# Patient Record
Sex: MALE | Race: WHITE | NOT HISPANIC OR LATINO | Employment: OTHER | URBAN - METROPOLITAN AREA
[De-identification: names, ages, dates, MRNs, and addresses within clinical notes are randomized per-mention and may not be internally consistent; named-entity substitution may affect disease eponyms.]

---

## 2017-01-02 ENCOUNTER — TELEPHONE (OUTPATIENT)
Dept: HEPATOLOGY | Facility: CLINIC | Age: 67
End: 2017-01-02

## 2017-01-02 DIAGNOSIS — R76.8 HEPATITIS C ANTIBODY TEST POSITIVE: ICD-10-CM

## 2017-01-03 NOTE — TELEPHONE ENCOUNTER
I spoke with patient, confirmed start date of 1/5. Informed of upcoming scheduled labs. Appointment letters mailed to patient.

## 2017-01-03 NOTE — TELEPHONE ENCOUNTER
Pl schedule u/s abdomen doppler (not pelvis as sally Ibarra scheduled) and AFP now. They never got done.  Pl inform pt these are done to look for any tumor in the liver.  It is important to do them.   Thanks

## 2017-01-03 NOTE — TELEPHONE ENCOUNTER
MA called patient to schedule his labs and US, patient stated that he does not want to schedule this test right now, he is not ready. He will give us a call sometime next week to schedule the test.MILENA

## 2017-01-10 DIAGNOSIS — R00.0 TACHYCARDIA: ICD-10-CM

## 2017-01-10 DIAGNOSIS — I10 ESSENTIAL HYPERTENSION, BENIGN: ICD-10-CM

## 2017-01-10 DIAGNOSIS — I25.2 HISTORY OF MI (MYOCARDIAL INFARCTION): ICD-10-CM

## 2017-01-10 DIAGNOSIS — E78.5 OTHER AND UNSPECIFIED HYPERLIPIDEMIA: ICD-10-CM

## 2017-01-10 RX ORDER — PRAVASTATIN SODIUM 40 MG/1
TABLET ORAL
Qty: 90 TABLET | Refills: 1 | Status: SHIPPED | OUTPATIENT
Start: 2017-01-10 | End: 2017-05-22 | Stop reason: CLARIF

## 2017-01-10 RX ORDER — LABETALOL 100 MG/1
TABLET, FILM COATED ORAL
Qty: 180 TABLET | Refills: 1 | Status: ON HOLD | OUTPATIENT
Start: 2017-01-10 | End: 2017-07-04

## 2017-01-12 ENCOUNTER — TELEPHONE (OUTPATIENT)
Dept: PHARMACY | Facility: CLINIC | Age: 67
End: 2017-01-12

## 2017-01-12 ENCOUNTER — TELEPHONE (OUTPATIENT)
Dept: HEPATOLOGY | Facility: CLINIC | Age: 67
End: 2017-01-12

## 2017-01-12 NOTE — TELEPHONE ENCOUNTER
I spoke with patient.  He wants to have labs drawn at [facility].  Unable to schedule in [facility].  I will send hard copy lab orders to patient and clinic if unable to schedule in [facility].

## 2017-01-12 NOTE — TELEPHONE ENCOUNTER
----- Message from Jennifer Deluca MA sent at 1/12/2017  1:42 PM CST -----  Contact: self   [phone]      ----- Message -----     From: Candace Weiss     Sent: 1/12/2017  12:15 PM       To: Adonis Collier Staff    Patient called stating the frequent trips to the [facility] for labs will be very expensive. Patient asking if he can go to the lab located inside [facility] in [place], [place]Thanks!

## 2017-01-12 NOTE — TELEPHONE ENCOUNTER
zofia f/u complete. Name/ confirmed. Medication list reviewed. Consultation included: indication; goals of treatment; administration; storage and handling; side effects; how to handle side effects; the importance of compliance; how to handle missed doses; the importance of laboratory monitoring; the importance of keeping all follow up appointments. Patient understands to report any medication changes to [facility] and provider. All questions answered and addressed to patients satisfaction. Patient confirmed start date noted above. He feels well. Understands treatment regimen and goals. He reports NO side effects. He uses a calendar to assist with compliance.

## 2017-01-24 ENCOUNTER — TELEPHONE (OUTPATIENT)
Dept: PHARMACY | Facility: CLINIC | Age: 67
End: 2017-01-24

## 2017-01-25 NOTE — TELEPHONE ENCOUNTER
[facility] refill arrangement. Confirmed 2 patient identifiers - name and . He/she has 7 doses remaining which will get him/her through 17. No side effects or missed doses reported. He/she confirms no changes to insurance or health and has no further questions at this time. Patient asked to have medication shipped on 17 to arrive at patient's home on 17. $0 copay. Address and CC info confirmed. [facility] will continue to reach out to patient monthly to arrange refills.

## 2017-02-02 ENCOUNTER — LAB VISIT (OUTPATIENT)
Dept: LAB | Facility: HOSPITAL | Age: 67
End: 2017-02-02
Attending: INTERNAL MEDICINE
Payer: MEDICARE

## 2017-02-02 ENCOUNTER — EPISODE CHANGES (OUTPATIENT)
Dept: HEPATOLOGY | Facility: CLINIC | Age: 67
End: 2017-02-02

## 2017-02-02 DIAGNOSIS — R76.8 HEPATITIS C ANTIBODY TEST POSITIVE: ICD-10-CM

## 2017-02-02 LAB
ALBUMIN SERPL BCP-MCNC: 4.1 G/DL
ALP SERPL-CCNC: 77 U/L
ALT SERPL W/O P-5'-P-CCNC: 21 U/L
ANION GAP SERPL CALC-SCNC: 13 MMOL/L
AST SERPL-CCNC: 31 U/L
BILIRUB SERPL-MCNC: 0.5 MG/DL
BUN SERPL-MCNC: 21 MG/DL
CALCIUM SERPL-MCNC: 10.1 MG/DL
CHLORIDE SERPL-SCNC: 101 MMOL/L
CO2 SERPL-SCNC: 27 MMOL/L
CREAT SERPL-MCNC: 1.1 MG/DL
EST. GFR  (AFRICAN AMERICAN): >60 ML/MIN/1.73 M^2
EST. GFR  (NON AFRICAN AMERICAN): >60 ML/MIN/1.73 M^2
GLUCOSE SERPL-MCNC: 75 MG/DL
POTASSIUM SERPL-SCNC: 3.9 MMOL/L
PROT SERPL-MCNC: 7.7 G/DL
SODIUM SERPL-SCNC: 141 MMOL/L

## 2017-02-02 PROCEDURE — 36415 COLL VENOUS BLD VENIPUNCTURE: CPT

## 2017-02-02 PROCEDURE — 87522 HEPATITIS C REVRS TRNSCRPJ: CPT

## 2017-02-02 PROCEDURE — 80053 COMPREHEN METABOLIC PANEL: CPT

## 2017-02-03 LAB — HEPATITIS C VIRUS (HCV) RNA DETECTION/QUANTIFICATION RT-PCR: NORMAL IU/ML

## 2017-02-07 ENCOUNTER — TELEPHONE (OUTPATIENT)
Dept: HEPATOLOGY | Facility: CLINIC | Age: 67
End: 2017-02-07

## 2017-02-07 NOTE — TELEPHONE ENCOUNTER
----- Message from Amira Lamb MD sent at 2/4/2017  7:07 PM CST -----  Pl inform pt:  Wk 4 on 2/2/17: CMP Ok, HCV RNA delmar quant <15, undetected.  Reminder next lab:  Wk 6 on 2/16/17: CMP, HCV RNA delmar quant ordered

## 2017-02-16 ENCOUNTER — EPISODE CHANGES (OUTPATIENT)
Dept: HEPATOLOGY | Facility: CLINIC | Age: 67
End: 2017-02-16

## 2017-02-16 ENCOUNTER — HOSPITAL ENCOUNTER (OUTPATIENT)
Dept: RADIOLOGY | Facility: HOSPITAL | Age: 67
Discharge: HOME | End: 2017-02-16
Attending: INTERNAL MEDICINE
Payer: MEDICARE

## 2017-02-16 DIAGNOSIS — B18.2 HEP C W/O COMA, CHRONIC: ICD-10-CM

## 2017-02-16 PROCEDURE — 76705 ECHO EXAM OF ABDOMEN: CPT | Mod: 26,,, | Performed by: RADIOLOGY

## 2017-02-16 PROCEDURE — 76705 ECHO EXAM OF ABDOMEN: CPT | Mod: TC

## 2017-02-20 ENCOUNTER — TELEPHONE (OUTPATIENT)
Dept: HEPATOLOGY | Facility: CLINIC | Age: 67
End: 2017-02-20

## 2017-02-20 NOTE — TELEPHONE ENCOUNTER
----- Message from Amira Lamb MD sent at 2/18/2017  6:48 AM CST -----  Pl inform patient:  U/S abd 2/16/17: there is a small cyst in the liver, similar to finding on CT scan in Oct 2014.  No tumor in the liver.

## 2017-02-20 NOTE — TELEPHONE ENCOUNTER
----- Message from Jennifer Deluca MA sent at 2/20/2017  9:24 AM CST -----      ----- Message -----     From: Amira Lamb MD     Sent: 2/18/2017   2:56 PM       To: [facility] Clinical Staff    Please inform patient:  Wk 6 on 2/16/17: AST and ALT normal, HCV RNA delmar undetected  Reminder next lab:  Wk 8 on 3/2/17: CMP, HCV RNA delmar quant ordered

## 2017-03-02 ENCOUNTER — EPISODE CHANGES (OUTPATIENT)
Dept: HEPATOLOGY | Facility: CLINIC | Age: 67
End: 2017-03-02

## 2017-03-03 ENCOUNTER — TELEPHONE (OUTPATIENT)
Dept: OPHTHALMOLOGY | Facility: CLINIC | Age: 67
End: 2017-03-03

## 2017-03-03 DIAGNOSIS — H40.003 GLAUCOMA SUSPECT, BILATERAL: Primary | ICD-10-CM

## 2017-03-03 RX ORDER — LATANOPROST 50 UG/ML
SOLUTION/ DROPS OPHTHALMIC
Qty: 7.5 ML | Refills: 0 | Status: SHIPPED | OUTPATIENT
Start: 2017-03-03 | End: 2017-11-02 | Stop reason: SDUPTHER

## 2017-03-06 ENCOUNTER — LAB VISIT (OUTPATIENT)
Dept: LAB | Facility: HOSPITAL | Age: 67
End: 2017-03-06
Attending: INTERNAL MEDICINE
Payer: MEDICARE

## 2017-03-06 ENCOUNTER — EPISODE CHANGES (OUTPATIENT)
Dept: HEPATOLOGY | Facility: CLINIC | Age: 67
End: 2017-03-06

## 2017-03-06 DIAGNOSIS — R76.8 HEPATITIS C ANTIBODY TEST POSITIVE: ICD-10-CM

## 2017-03-06 LAB
ALBUMIN SERPL BCP-MCNC: 4.2 G/DL
ALP SERPL-CCNC: 78 U/L
ALT SERPL W/O P-5'-P-CCNC: 22 U/L
ANION GAP SERPL CALC-SCNC: 12 MMOL/L
AST SERPL-CCNC: 35 U/L
BILIRUB SERPL-MCNC: 0.6 MG/DL
BUN SERPL-MCNC: 16 MG/DL
CALCIUM SERPL-MCNC: 10.3 MG/DL
CHLORIDE SERPL-SCNC: 102 MMOL/L
CO2 SERPL-SCNC: 30 MMOL/L
CREAT SERPL-MCNC: 1.1 MG/DL
EST. GFR  (AFRICAN AMERICAN): >60 ML/MIN/1.73 M^2
EST. GFR  (NON AFRICAN AMERICAN): >60 ML/MIN/1.73 M^2
GLUCOSE SERPL-MCNC: 80 MG/DL
POTASSIUM SERPL-SCNC: 4.3 MMOL/L
PROT SERPL-MCNC: 7.9 G/DL
SODIUM SERPL-SCNC: 144 MMOL/L

## 2017-03-06 PROCEDURE — 80053 COMPREHEN METABOLIC PANEL: CPT

## 2017-03-06 PROCEDURE — 36415 COLL VENOUS BLD VENIPUNCTURE: CPT

## 2017-03-06 PROCEDURE — 87522 HEPATITIS C REVRS TRNSCRPJ: CPT

## 2017-03-07 LAB — HEPATITIS C VIRUS (HCV) RNA DETECTION/QUANTIFICATION RT-PCR: NORMAL IU/ML

## 2017-03-08 ENCOUNTER — TELEPHONE (OUTPATIENT)
Dept: HEPATOLOGY | Facility: CLINIC | Age: 67
End: 2017-03-08

## 2017-03-08 RX ORDER — BRIMONIDINE TARTRATE 1.5 MG/ML
SOLUTION/ DROPS OPHTHALMIC
Qty: 30 ML | Refills: 0 | Status: SHIPPED | OUTPATIENT
Start: 2017-03-08 | End: 2017-10-09 | Stop reason: SDUPTHER

## 2017-03-08 NOTE — TELEPHONE ENCOUNTER
----- Message from Amira Lamb MD sent at 3/8/2017 12:43 AM CST -----  Pl inform patient:  Wk 8 on 3/2/17:  AST and ALT normal, HCV RNA delmar quant undetected  Reminder next lab:  Post-rx wk 4 on 3/30/17:  HCV RNA quant ordered

## 2017-03-10 ENCOUNTER — TELEPHONE (OUTPATIENT)
Dept: FAMILY MEDICINE | Facility: CLINIC | Age: 67
End: 2017-03-10

## 2017-03-10 NOTE — TELEPHONE ENCOUNTER
----- Message from Kailee Lucia sent at 3/10/2017  3:12 PM CST -----  Pt called requesting a call back regarding his medication/pls call back at [phone]

## 2017-03-10 NOTE — TELEPHONE ENCOUNTER
Patient is seeing a provider for workers comp for broken hip and back and they are no longer writing his Ambien and flexeril. The patient is wanting Dr Dickson to start writing the medication for him. I informed the patient that he would need to schedule an appointment with  to discuss this matter. Patient is going to call back for an appointment

## 2017-03-30 ENCOUNTER — EPISODE CHANGES (OUTPATIENT)
Dept: HEPATOLOGY | Facility: CLINIC | Age: 67
End: 2017-03-30

## 2017-04-03 ENCOUNTER — LAB VISIT (OUTPATIENT)
Dept: LAB | Facility: HOSPITAL | Age: 67
End: 2017-04-03
Attending: INTERNAL MEDICINE
Payer: MEDICARE

## 2017-04-03 ENCOUNTER — EPISODE CHANGES (OUTPATIENT)
Dept: HEPATOLOGY | Facility: CLINIC | Age: 67
End: 2017-04-03

## 2017-04-03 DIAGNOSIS — R76.8 HEPATITIS C ANTIBODY TEST POSITIVE: ICD-10-CM

## 2017-04-03 PROCEDURE — 87522 HEPATITIS C REVRS TRNSCRPJ: CPT

## 2017-04-03 PROCEDURE — 36415 COLL VENOUS BLD VENIPUNCTURE: CPT

## 2017-04-04 LAB — HEPATITIS C VIRUS (HCV) RNA DETECTION/QUANTIFICATION RT-PCR: NORMAL IU/ML

## 2017-04-05 ENCOUNTER — PATIENT MESSAGE (OUTPATIENT)
Dept: HEPATOLOGY | Facility: CLINIC | Age: 67
End: 2017-04-05

## 2017-04-05 ENCOUNTER — EPISODE CHANGES (OUTPATIENT)
Dept: HEPATOLOGY | Facility: CLINIC | Age: 67
End: 2017-04-05

## 2017-04-05 NOTE — TELEPHONE ENCOUNTER
Pl inform pt:  Post-rx wk 4 on 3/30/17:  HCV RNA quant undetected, SVR4  Reminder next lab:  Post-rx wk 12 on 5/25/17: HCV RNA quant ordered

## 2017-04-19 ENCOUNTER — TELEPHONE (OUTPATIENT)
Dept: OPHTHALMOLOGY | Facility: CLINIC | Age: 67
End: 2017-04-19

## 2017-04-19 NOTE — TELEPHONE ENCOUNTER
----- Message from Laura Ashley sent at 4/19/2017 12:15 PM CDT -----  Contact: Patient  Patient would like to re-schedule all three appointments that is scheduled for 04/52/2017.  The next available appointments aren't until June.  Please call [phone].  Thank you

## 2017-04-25 ENCOUNTER — OFFICE VISIT (OUTPATIENT)
Dept: FAMILY MEDICINE | Facility: CLINIC | Age: 67
End: 2017-04-25
Payer: MEDICARE

## 2017-04-25 ENCOUNTER — DOCUMENTATION ONLY (OUTPATIENT)
Dept: FAMILY MEDICINE | Facility: CLINIC | Age: 67
End: 2017-04-25

## 2017-04-25 ENCOUNTER — NURSE TRIAGE (OUTPATIENT)
Dept: ADMINISTRATIVE | Facility: CLINIC | Age: 67
End: 2017-04-25

## 2017-04-25 VITALS
HEIGHT: 69 IN | OXYGEN SATURATION: 94 % | DIASTOLIC BLOOD PRESSURE: 85 MMHG | BODY MASS INDEX: 29.16 KG/M2 | HEART RATE: 73 BPM | SYSTOLIC BLOOD PRESSURE: 119 MMHG | RESPIRATION RATE: 16 BRPM | TEMPERATURE: 98 F | WEIGHT: 196.88 LBS

## 2017-04-25 DIAGNOSIS — F33.9 EPISODE OF RECURRENT MAJOR DEPRESSIVE DISORDER, UNSPECIFIED DEPRESSION EPISODE SEVERITY: Primary | ICD-10-CM

## 2017-04-25 DIAGNOSIS — R07.89 CHEST TIGHTNESS: ICD-10-CM

## 2017-04-25 DIAGNOSIS — M62.838 MUSCLE SPASM: ICD-10-CM

## 2017-04-25 PROCEDURE — 99214 OFFICE O/P EST MOD 30 MIN: CPT | Mod: S$GLB,,, | Performed by: NURSE PRACTITIONER

## 2017-04-25 PROCEDURE — 93005 ELECTROCARDIOGRAM TRACING: CPT | Mod: S$GLB,,, | Performed by: NURSE PRACTITIONER

## 2017-04-25 PROCEDURE — 1160F RVW MEDS BY RX/DR IN RCRD: CPT | Mod: S$GLB,,, | Performed by: NURSE PRACTITIONER

## 2017-04-25 PROCEDURE — 3079F DIAST BP 80-89 MM HG: CPT | Mod: S$GLB,,, | Performed by: NURSE PRACTITIONER

## 2017-04-25 PROCEDURE — 93010 ELECTROCARDIOGRAM REPORT: CPT | Mod: ,,, | Performed by: INTERNAL MEDICINE

## 2017-04-25 PROCEDURE — 3074F SYST BP LT 130 MM HG: CPT | Mod: S$GLB,,, | Performed by: NURSE PRACTITIONER

## 2017-04-25 RX ORDER — FENTANYL 75 UG/H
2 PATCH TRANSDERMAL DAILY
Refills: 0 | COMMUNITY
Start: 2017-04-12 | End: 2017-05-22

## 2017-04-25 RX ORDER — TIZANIDINE 4 MG/1
4 TABLET ORAL 3 TIMES DAILY PRN
Qty: 90 TABLET | Refills: 11 | Status: SHIPPED | OUTPATIENT
Start: 2017-04-25 | End: 2017-05-05

## 2017-04-25 RX ORDER — FLUOXETINE HYDROCHLORIDE 40 MG/1
40 CAPSULE ORAL DAILY
Qty: 30 CAPSULE | Refills: 11 | Status: SHIPPED | OUTPATIENT
Start: 2017-04-25 | End: 2017-07-12 | Stop reason: SDUPTHER

## 2017-04-25 NOTE — PROGRESS NOTES
Health Maintenance Due   Topic Date Due    TETANUS VACCINE  12/08/1969    Colonoscopy  12/08/2001    Pneumococcal (65+) (1 of 2 - PCV13) 12/08/2016

## 2017-04-25 NOTE — MR AVS SNAPSHOT
[facility]  [address] [address] [address]  Phone: [phone]  Fax: [phone]                  Benny Murray   2017 1:20 PM   Office Visit    Description:  Male : 1950   Provider:  PARRISH Osman   Department:  [facility]           Reason for Visit     Chest Pain           Diagnoses this Visit        Comments    Episode of recurrent major depressive disorder, unspecified depression episode severity    -  Primary     Muscle spasm                To Do List           Future Appointments        Provider Department Dept Phone    2017 8:00 AM [facility] [phone]    2017 10:45 AM Skyla Crow MD [place] [facility] - Ophthalmology [phone]    2017 11:00 AM [facility] [place] [facility] 2 - Ophthalmology [phone]    2017 11:30 AM [facility] [place] [facility] 2 - Ophthalmology [phone]    2017 8:00 AM [facility], [facility] [phone]      Goals (5 Years of Data)     None      Follow-Up and Disposition     Return if symptoms worsen or fail to improve.    Follow-up and Disposition History       These Medications        Disp Refills Start End    fluoxetine (PROZAC) 40 MG capsule 30 capsule 11 2017     Take 1 capsule (40 mg total) by mouth once daily. - Oral    Pharmacy: [place] [id number] - [facility] [address] [facility] [address] Ph #: [phone]       tizanidine (ZANAFLEX) 4 MG tablet 90 tablet 11 2017    Take 1 tablet (4 mg total) by mouth 3 (three) times daily as needed. - Oral    Pharmacy: [place] [id number] - [facility] [address] [facility] [address] Ph #: [phone]         [facility] On Call     [facility] On Call Nurse Care Line - 24/7 Assistance  Unless otherwise directed by your provider, please contact [facility], our nurse care line that is available for 24/7 assistance.     Registered nurses in the [facility] On Call  "Center provide: appointment scheduling, clinical advisement, health education, and other advisory services.  Call: [phone] (toll free)               Medications           Message regarding Medications     Verify the changes and/or additions to your medication regime listed below are the same as discussed with your clinician today.  If any of these changes or additions are incorrect, please notify your healthcare provider.        START taking these NEW medications        Refills    tizanidine (ZANAFLEX) 4 MG tablet 11    Sig: Take 1 tablet (4 mg total) by mouth 3 (three) times daily as needed.    Class: Normal    Route: Oral      CHANGE how you are taking these medications     Start Taking Instead of    fluoxetine (PROZAC) 40 MG capsule fluoxetine (PROZAC) 40 MG capsule    Dosage:  Take 1 capsule (40 mg total) by mouth once daily.     Reason for Change:  Reorder       STOP taking these medications     fentaNYL (DURAGESIC) 100 mcg/hr Place 1 patch onto the skin every 72 hours. Pt takes 150mcg total.  Uses 75mcg and changes daily.    fentaNYL (DURAGESIC) 50 mcg/hr Place 1 patch onto the skin every 72 hours. Pt takes 150mcg total.  Pt uses 75mcg patch and changes daily    ledipasvir-sofosbuvir  mg Tab Take 1 tablet by mouth once daily.    cyclobenzaprine (FLEXERIL) 10 MG tablet            Verify that the below list of medications is an accurate representation of the medications you are currently taking.  If none reported, the list may be blank. If incorrect, please contact your healthcare provider. Carry this list with you in case of emergency.           Current Medications     aspirin (ECOTRIN) 81 MG EC tablet Take 81 mg by mouth once daily.     BD LUER-RADHA SYRINGE 3 mL 23 x 1 1/2" Syrg     brimonidine 0.15 % OPTH DROP (ALPHAGAN) 0.15 % ophthalmic solution INSTILL 1 DROP INTO EACH EYE TWICE A DAY    fentaNYL (DURAGESIC) 75 mcg/hr Place 2 patches onto the skin once daily.    fluoxetine (PROZAC) 40 MG " "capsule Take 1 capsule (40 mg total) by mouth once daily.    labetalol (NORMODYNE) 100 MG tablet TAKE ONE TABLET BY MOUTH TWICE DAILY    latanoprost 0.005 % ophthalmic solution PLACE 1 DROP INTO THE RIGHT EYE EVERY EVENING    nitroglycerin (NITROSTAT) 0.6 MG Subl 1 tab every 5 minutes for shortness of breath on exertion, max 3 doses.    polyethylene glycol (GLYCOLAX) 17 gram/dose powder TAKE 17 GRAMS MIXED IN LIQUID ONCE DAILY    pravastatin (PRAVACHOL) 40 MG tablet TAKE ONE TABLET BY MOUTH ONCE DAILY    tizanidine (ZANAFLEX) 4 MG tablet Take 1 tablet (4 mg total) by mouth 3 (three) times daily as needed.    verapamil (CALAN-SR) 240 MG CR tablet TAKE 1 TABLET BY MOUTH ONCE q hs    zolpidem (AMBIEN) 5 MG Tab            Clinical Reference Information           Your Vitals Were     BP Pulse Temp Resp Height Weight    119/85 (BP Location: Left arm, Patient Position: Sitting, BP Method: Automatic) 73 98 °F (36.7 °C) (Oral) 16 5' 9" (1.753 m) 89.3 kg (196 lb 13.9 oz)    SpO2 BMI             94% 29.07 kg/m2         Blood Pressure          Most Recent Value    BP  119/85      Allergies as of 4/25/2017     No Known Allergies      Immunizations Administered on Date of Encounter - 4/25/2017     None      Instructions    Discussed how to take nitroglycerine, 1 under the tongue at onset of chest pain or tightness, then every 5 minutes, up to 3 tabs, if no pain or tightness persists, call 911       Language Assistance Services     ATTENTION: Language assistance services are available, free of charge. Please call [phone].      ATENCIÓN: Si habla español, tiene a reeves disposición servicios gratuitos de asistencia lingüística. Llame al [phone].     SARY Ý: N?u b?n nói Ti?ng Vi?t, có các d?ch v? h? tr? ngôn ng? mi?n phí dành cho b?n. G?i s? [phone].         [facility] complies with applicable Federal civil rights laws and does not discriminate on the basis of race, color, national origin, age, " disability, or sex.

## 2017-04-25 NOTE — PROGRESS NOTES
Subjective:       Patient ID: Benny Murray is a 66 y.o. male.    Chief Complaint: Chest Pain    Chest Pain    This is a new (Happens when he is sitting at rest, laying down and cooking. Last time it happened was a few minutes ago in the waiting room. ) problem. The current episode started in the past 7 days. The onset quality is sudden. The problem occurs 2 to 4 times per day (lasted for more than 1/2 hour on Sunday and he feels very fatigued afterward. ). The problem has been rapidly worsening. The pain is present in the substernal region. The quality of the pain is described as tightness. The pain does not radiate. Associated symptoms include weakness. Pertinent negatives include no diaphoresis, dizziness, exertional chest pressure, nausea or shortness of breath. Risk factors include being elderly and male gender.   His past medical history is significant for CAD and hypertension.   Denies any chest pain, tightness or dyspnea currently.   Needs refills on prozac, feels his depression is well controlled on prozac.     Will not be seeing workman's comp physician much longer, will need refills on muscle relaxer. Flexeril was what he has been taking, but it is on the Beer's list and not covered by his insurance.   Review of Systems   Constitutional: Negative for diaphoresis.   Respiratory: Negative for shortness of breath.    Cardiovascular: Positive for chest pain.   Gastrointestinal: Negative for nausea.   Neurological: Positive for weakness. Negative for dizziness.       Objective:    EKG shows NSR with old infarct, 1st degree AV block and appears to have ischemia in lead V2.   Physical Exam   Constitutional: He is oriented to person, place, and time. He appears well-developed and well-nourished. No distress.   HENT:   Head: Normocephalic and atraumatic.   Eyes: Conjunctivae are normal. Right eye exhibits no discharge. Left eye exhibits no discharge. No scleral icterus.   Cardiovascular: Normal rate, regular  rhythm and normal heart sounds.  Exam reveals no gallop and no friction rub.    No murmur heard.  Pulmonary/Chest: Effort normal and breath sounds normal. No respiratory distress. He has no wheezes. He has no rales.   Neurological: He is alert and oriented to person, place, and time.   Skin: Skin is warm and dry. He is not diaphoretic.   Psychiatric: He has a normal mood and affect. His behavior is normal.   Nursing note and vitals reviewed.      Assessment:       1. Episode of recurrent major depressive disorder, unspecified depression episode severity    2. Muscle spasm    3. Chest tightness        Plan:       Episode of recurrent major depressive disorder, unspecified depression episode severity  -     fluoxetine (PROZAC) 40 MG capsule; Take 1 capsule (40 mg total) by mouth once daily.  Dispense: 30 capsule; Refill: 11    Muscle spasm  -     tizanidine (ZANAFLEX) 4 MG tablet; Take 1 tablet (4 mg total) by mouth 3 (three) times daily as needed.  Dispense: 90 tablet; Refill: 11    Chest tightness  -     EKG 12-lead      Discussed how to take nitroglycerine, 1 under the tongue at onset of chest pain or tightness, then every 5 minutes, up to 3 tabs, if no pain or tightness persists, call 911.  After discussion with Dr. Dickson, will refer patient to [facility] ED.

## 2017-04-25 NOTE — TELEPHONE ENCOUNTER
"    Reason for Disposition   Intermittent chest pains persist > 3 days     Patient states that he has had chest tightness off and on since this past Sunday.  States that when it has occurred, it has lasted for about 10 minutes.  Also stated "I don't have chest pain, I haven't had chest pain, but just a tightness in my chest." States he is not having chest tightness right now, but it comes and goes.  States that he does not have a Cardiologist.  States that he would like to see "Beatrice, the nurse practitioner for Dr. Dickson."  Nurse unable to make an appointment for  Dr. Dickson or the NP today. Patient stated that he would call himself to attempt to get an appointment today.  Advised patient to go to the emergency room, and if he starts to have the chest tightness, or chest pains, or if he is unable to go in to see doctor today.  Patient stated "If I can't go in to see the doctor today, I will just go to the emergency room." Patient asked that message be sent to Dr. Dickson and Beatrice, MANOHAR's office.  Patient would like for them to call him today regarding an appointment with either the NP or Dr. Dickson is she is unavailable.  Message is being sent as requested.    Protocols used: ST CHEST PAIN-A-OH      "

## 2017-04-25 NOTE — PATIENT INSTRUCTIONS
Discussed how to take nitroglycerine, 1 under the tongue at onset of chest pain or tightness, then every 5 minutes, up to 3 tabs, if no pain or tightness persists, call [phone]

## 2017-04-26 ENCOUNTER — TELEPHONE (OUTPATIENT)
Dept: FAMILY MEDICINE | Facility: CLINIC | Age: 67
End: 2017-04-26

## 2017-04-26 NOTE — TELEPHONE ENCOUNTER
Pharmacy was wanting to clarfy that the patient was getting zanaflex instead of flexeril.I told her that flexeril is no longer on patients med list.She said his insurance would not cover the flexeril so i told her to fill the zanaflex.

## 2017-04-26 NOTE — TELEPHONE ENCOUNTER
----- Message from Cristhian Newman sent at 4/25/2017  2:16 PM CDT -----  [facility] have [facility] question regarding RX that was sent over please call back at [phone]

## 2017-04-27 ENCOUNTER — TELEPHONE (OUTPATIENT)
Dept: FAMILY MEDICINE | Facility: CLINIC | Age: 67
End: 2017-04-27

## 2017-04-27 NOTE — TELEPHONE ENCOUNTER
----- Message from Aurelia Roberts sent at 4/27/2017 11:18 AM CDT -----  Regarding: EKG ORDER  Please put in EKG order, thanks. Aurelia [phone]

## 2017-05-03 DIAGNOSIS — R06.02 SHORTNESS OF BREATH: ICD-10-CM

## 2017-05-04 RX ORDER — NITROGLYCERIN 0.6 MG/1
TABLET SUBLINGUAL
Qty: 100 TABLET | Refills: 9 | OUTPATIENT
Start: 2017-05-04

## 2017-05-17 ENCOUNTER — TELEPHONE (OUTPATIENT)
Dept: FAMILY MEDICINE | Facility: CLINIC | Age: 67
End: 2017-05-17

## 2017-05-17 NOTE — TELEPHONE ENCOUNTER
----- Message from Kailee Lucia sent at 5/15/2017 10:00 AM CDT -----  Pt called stated that he's returning a call back to Laila/estelita call back at [phone]

## 2017-05-17 NOTE — TELEPHONE ENCOUNTER
Patient wants to detox off of medication he has been on for 14 years scheduled with DR Dickson to discuss options

## 2017-05-17 NOTE — TELEPHONE ENCOUNTER
----- Message from Nadine Dawkins sent at 5/16/2017  8:34 AM CDT -----  Contact: Benny  Need medical advice. Tried calling yesterday. Would like to speak with her directly.  Call back [phone]. thanks

## 2017-05-18 ENCOUNTER — DOCUMENTATION ONLY (OUTPATIENT)
Dept: FAMILY MEDICINE | Facility: CLINIC | Age: 67
End: 2017-05-18

## 2017-05-19 ENCOUNTER — TELEPHONE (OUTPATIENT)
Dept: FAMILY MEDICINE | Facility: CLINIC | Age: 67
End: 2017-05-19

## 2017-05-19 ENCOUNTER — OFFICE VISIT (OUTPATIENT)
Dept: FAMILY MEDICINE | Facility: CLINIC | Age: 67
End: 2017-05-19
Payer: MEDICARE

## 2017-05-19 VITALS
TEMPERATURE: 98 F | SYSTOLIC BLOOD PRESSURE: 101 MMHG | WEIGHT: 196.63 LBS | BODY MASS INDEX: 29.12 KG/M2 | OXYGEN SATURATION: 96 % | HEIGHT: 69 IN | RESPIRATION RATE: 16 BRPM | HEART RATE: 82 BPM | DIASTOLIC BLOOD PRESSURE: 69 MMHG

## 2017-05-19 DIAGNOSIS — M54.50 CHRONIC LEFT-SIDED LOW BACK PAIN WITHOUT SCIATICA: Primary | ICD-10-CM

## 2017-05-19 DIAGNOSIS — G89.29 CHRONIC LEFT-SIDED LOW BACK PAIN WITHOUT SCIATICA: Primary | ICD-10-CM

## 2017-05-19 DIAGNOSIS — F11.20 NARCOTIC DEPENDENCE: ICD-10-CM

## 2017-05-19 DIAGNOSIS — I70.0 ATHEROSCLEROSIS OF AORTA: ICD-10-CM

## 2017-05-19 DIAGNOSIS — I25.110 CORONARY ARTERY DISEASE INVOLVING NATIVE HEART WITH UNSTABLE ANGINA PECTORIS, UNSPECIFIED VESSEL OR LESION TYPE: ICD-10-CM

## 2017-05-19 PROCEDURE — 3078F DIAST BP <80 MM HG: CPT | Mod: S$GLB,,, | Performed by: INTERNAL MEDICINE

## 2017-05-19 PROCEDURE — 99213 OFFICE O/P EST LOW 20 MIN: CPT | Mod: 25,S$GLB,, | Performed by: INTERNAL MEDICINE

## 2017-05-19 PROCEDURE — 1160F RVW MEDS BY RX/DR IN RCRD: CPT | Mod: S$GLB,,, | Performed by: INTERNAL MEDICINE

## 2017-05-19 PROCEDURE — 99499 UNLISTED E&M SERVICE: CPT | Mod: S$GLB,,, | Performed by: INTERNAL MEDICINE

## 2017-05-19 PROCEDURE — 3074F SYST BP LT 130 MM HG: CPT | Mod: S$GLB,,, | Performed by: INTERNAL MEDICINE

## 2017-05-19 PROCEDURE — 20550 NJX 1 TENDON SHEATH/LIGAMENT: CPT | Mod: S$GLB,,, | Performed by: INTERNAL MEDICINE

## 2017-05-19 PROCEDURE — 80305 DRUG TEST PRSMV DIR OPT OBS: CPT | Mod: QW,S$GLB,, | Performed by: INTERNAL MEDICINE

## 2017-05-19 RX ORDER — PRASUGREL HYDROCHLORIDE 10 MG/1
1 TABLET, COATED ORAL DAILY
Refills: 2 | COMMUNITY
Start: 2017-04-28 | End: 2018-03-14

## 2017-05-19 RX ORDER — LISINOPRIL 5 MG/1
1 TABLET ORAL 2 TIMES DAILY
Refills: 3 | Status: ON HOLD | COMMUNITY
Start: 2017-04-28 | End: 2017-07-04

## 2017-05-19 RX ORDER — PANTOPRAZOLE SODIUM 40 MG/1
1 TABLET, DELAYED RELEASE ORAL DAILY
Refills: 3 | COMMUNITY
Start: 2017-04-28 | End: 2017-09-25 | Stop reason: SDUPTHER

## 2017-05-19 NOTE — MR AVS SNAPSHOT
[facility]  [address] [address] [address]  Phone: [phone]  Fax: [phone]                  Benny Murray   2017 10:00 AM   Office Visit    Description:  Male : 1950   Provider:  Valentino Dickson MD   Department:  [facility]           Reason for Visit     Hip Pain     Back Pain           Diagnoses this Visit        Comments    Chronic left-sided low back pain without sciatica    -  Primary     Narcotic dependence         Coronary artery disease involving native heart with unstable angina pectoris, unspecified vessel or lesion type         Atherosclerosis of aorta                To Do List           Future Appointments        Provider Department Dept Phone    2017 8:00 AM [facility] [phone]    2017 1:20 PM Valentino Dickson MD [facility] [phone]    2017 10:45 AM Skyla Crow MD [place] [facility] - Ophthalmology [phone]    2017 11:00 AM [facility] [place] [facility] - Ophthalmology [phone]    2017 11:30 AM [facility] [place] [facility] - Ophthalmology [phone]      Goals (5 Years of Data)     None      Follow-Up and Disposition     Return in about 3 days (around 2017).    Follow-up and Disposition History      [facility] On Call     [facility] Nurse Care Line -  Assistance  Unless otherwise directed by your provider, please contact [facility], our nurse care line that is available for  assistance.     Registered nurses in the [facility] On Call Center provide: appointment scheduling, clinical advisement, health education, and other advisory services.  Call: [phone] (toll free)               Medications           Message regarding Medications     Verify the changes and/or additions to your medication regime listed below are the same as discussed with your clinician today.  If any of these changes or additions  "are incorrect, please notify your healthcare provider.             Verify that the below list of medications is an accurate representation of the medications you are currently taking.  If none reported, the list may be blank. If incorrect, please contact your healthcare provider. Carry this list with you in case of emergency.           Current Medications     aspirin (ECOTRIN) 81 MG EC tablet Take 81 mg by mouth once daily.     BD LUER-RADHA SYRINGE 3 mL 23 x 1 1/2" Syrg     brimonidine 0.15 % OPTH DROP (ALPHAGAN) 0.15 % ophthalmic solution INSTILL 1 DROP INTO EACH EYE TWICE A DAY    EFFIENT 10 mg Tab Take 1 tablet by mouth once daily.    fentaNYL (DURAGESIC) 75 mcg/hr Place 2 patches onto the skin once daily.    fluoxetine (PROZAC) 40 MG capsule Take 1 capsule (40 mg total) by mouth once daily.    labetalol (NORMODYNE) 100 MG tablet TAKE ONE TABLET BY MOUTH TWICE DAILY    latanoprost 0.005 % ophthalmic solution PLACE 1 DROP INTO THE RIGHT EYE EVERY EVENING    lisinopril (PRINIVIL,ZESTRIL) 5 MG tablet Take 1 tablet by mouth once daily.    nitroglycerin (NITROSTAT) 0.6 MG Subl 1 tab every 5 minutes for shortness of breath on exertion, max 3 doses.    pantoprazole (PROTONIX) 40 MG tablet Take 1 tablet by mouth once daily.    polyethylene glycol (GLYCOLAX) 17 gram/dose powder TAKE 17 GRAMS MIXED IN LIQUID ONCE DAILY    pravastatin (PRAVACHOL) 40 MG tablet TAKE ONE TABLET BY MOUTH ONCE DAILY    verapamil (CALAN-SR) 240 MG CR tablet TAKE 1 TABLET BY MOUTH ONCE q hs    zolpidem (AMBIEN) 5 MG Tab            Clinical Reference Information           Your Vitals Were     BP Pulse Temp Resp Height Weight    101/69 (BP Location: Right arm, Patient Position: Sitting, BP Method: Automatic) 82 98.3 °F (36.8 °C) (Oral) 16 5' 9" (1.753 m) 89.2 kg (196 lb 10.4 oz)    SpO2 BMI             96% 29.04 kg/m2         Blood Pressure          Most Recent Value    BP  101/69      Allergies as of 5/19/2017     No Known Allergies    "   Immunizations Administered on Date of Encounter - 5/19/2017     None      Orders Placed During Today's Visit     Future Labs/Procedures Expected by Expires    POCT BUP Urine Drug Test  5/19/2017 7/18/2018      Instructions    Stop Bentyl patch 24 hours before you come in Monday.       An order for a urine drug screen with suboxone was given.  The patient is to use the order when called, on a random day.       You'll need to wean off the Ambien       Language Assistance Services     ATTENTION: Language assistance services are available, free of charge. Please call [phone].      ATENCIÓN: Si habla español, tiene a reeves disposición servicios gratuitos de asistencia lingüística. Llame al [phone].     SARY Ý: N?u b?n nói Ti?ng Vi?t, có các d?ch v? h? tr? ngôn ng? mi?n phí dành cho b?n. G?i s? [phone].         [facility] complies with applicable Federal civil rights laws and does not discriminate on the basis of race, color, national origin, age, disability, or sex.

## 2017-05-19 NOTE — TELEPHONE ENCOUNTER
----- Message from Nadine Dawkins sent at 5/19/2017 11:20 AM CDT -----  Contact: Benny  Patient forgot to stop and make appointment on the way out. States Dr. Dickson wants to see him on Monday afternoon. Please call back [phone]

## 2017-05-19 NOTE — PATIENT INSTRUCTIONS
Stop Bentyl patch 24 hours before you come in Monday.       An order for a urine drug screen with suboxone was given.  The patient is to use the order when called, on a random day.       You'll need to wean off the Ambien

## 2017-05-19 NOTE — PROGRESS NOTES
"Subjective:       Patient ID: Benny Murray is a 66 y.o. male.    Chief Complaint: Hip Pain (wants to discuss detoxing from fentanyl patch) and Back Pain    HPI         CHIEF COMPLAINT: Back Pain.  HPI: The patient has been on fentanyl 150 mg/h 3 years.  He is losing his pain management to [facility].  He was asking to be detoxed even though he has severe pain.  He says he can deal with pain but not the withdrawal    ONSET/TIMING: Onset     3 wk   ago. . Inciting event:  Lifting: no.  Over-exertion: no.     DURATION: . Intermittent, with weight bearing.     QUALITY/COURSE:   unchanged    LOCATION:       Left s1         Radiation:   l thigh    INTENSITY/SEVERITY: Severity is #   8  (10 point scale)..      MODIFIERS/TREATMENTS: .. Taking medications:    yes. . . Litigation_pending: no ..  MRI: no .    SYMPTOMS/RELATED: . --Possible medication side effects include:  .     The symptoms/statements  below are positive if BOLDED, otherwise negative.           CONTEXT/WHEN: . --Activity. . Coughing..  Bending.  Sitting. Hx_of_CA:.. History_of_IV_drug_abuse.  Work_related.. Similar_problems _in_past. Trauma .       REVIEW OF SYMPTOMS:       .Leg _Pain_to_below_knee.  Hip_pain..  Weight_loss.   dyspareunia .  Weakness.  Numbness.    2 weeks ago the patient was admitted for chest pain.  He had a angiogram showing 2 levels of blockage on his left main he underwent 2 stents.  Is not having anymore chest pain.  He is on Effient.      Review of Systems    Objective:      Vitals:    05/19/17 1025   BP: 101/69   Pulse: 82   Resp: 16   Temp: 98.3 °F (36.8 °C)   TempSrc: Oral   SpO2: 96%   Weight: 89.2 kg (196 lb 10.4 oz)   Height: 5' 9" (1.753 m)   PainSc:   8   PainLoc: Back     Physical Exam   Constitutional: He appears well-developed and well-nourished.   Eyes: Pupils are equal, round, and reactive to light.   Cardiovascular: Normal rate, regular rhythm and normal heart sounds.    Pulmonary/Chest: Effort normal and breath sounds " normal.   Abdominal: Soft. There is no tenderness.   Neurological: He is alert.   Psychiatric: He has a normal mood and affect. His behavior is normal. Thought content normal.   Nursing note and vitals reviewed.      procedure: Written consent given.  The left S1 paraspinal area was cleaned with alcohol.  5 mg of Marcaine and 40 mg of Depo-Medrol were injected with relief.  Patient tolerated the procedure well.  Assessment:       1. Chronic left-sided low back pain without sciatica    2. Narcotic dependence    3. Coronary artery disease involving native heart with unstable angina pectoris, unspecified vessel or lesion type    4. Atherosclerosis of aorta          Plan:     Chronic left-sided low back pain without sciatica    Narcotic dependence  -     POCT BUP Urine Drug Test; Future; Expected date: 5/19/17    Coronary artery disease involving native heart with unstable angina pectoris, unspecified vessel or lesion type    Atherosclerosis of aorta      Return in about 3 days (around 5/22/2017).

## 2017-05-22 ENCOUNTER — OFFICE VISIT (OUTPATIENT)
Dept: FAMILY MEDICINE | Facility: CLINIC | Age: 67
End: 2017-05-22
Payer: MEDICARE

## 2017-05-22 ENCOUNTER — HOSPITAL ENCOUNTER (INPATIENT)
Facility: HOSPITAL | Age: 67
LOS: 5 days | Discharge: HOME | DRG: 897 | End: 2017-05-27
Attending: EMERGENCY MEDICINE | Admitting: HOSPITALIST
Payer: MEDICARE

## 2017-05-22 ENCOUNTER — TELEPHONE (OUTPATIENT)
Dept: FAMILY MEDICINE | Facility: CLINIC | Age: 67
End: 2017-05-22

## 2017-05-22 VITALS
WEIGHT: 194.44 LBS | BODY MASS INDEX: 28.8 KG/M2 | TEMPERATURE: 98 F | SYSTOLIC BLOOD PRESSURE: 132 MMHG | RESPIRATION RATE: 16 BRPM | HEIGHT: 69 IN | DIASTOLIC BLOOD PRESSURE: 98 MMHG | OXYGEN SATURATION: 97 % | HEART RATE: 75 BPM

## 2017-05-22 DIAGNOSIS — R06.02 SHORTNESS OF BREATH: ICD-10-CM

## 2017-05-22 DIAGNOSIS — G25.79 SEROTONIN SYNDROME: Primary | ICD-10-CM

## 2017-05-22 DIAGNOSIS — I25.10 CAD (CORONARY ARTERY DISEASE): ICD-10-CM

## 2017-05-22 DIAGNOSIS — F11.93 NARCOTIC WITHDRAWAL: Primary | ICD-10-CM

## 2017-05-22 DIAGNOSIS — F11.93 OPIATE WITHDRAWAL: ICD-10-CM

## 2017-05-22 LAB
AMP D-AMPHETAMINE 1000 NG/ML: NEGATIVE
BAR SECOBARBITAL 300 NG/ML: NEGATIVE
BUP BUPRENORPHINE 10 NG/ML: NEGATIVE
BZO OXAZEPAM 300 NG/ML: POSITIVE
COC BENZOYLECGONINE 300 NG/ML: NEGATIVE
CTP QC/QA: YES
MET D-METHAMPHETAMINE 500 NG/ML: NEGATIVE
MOP MORPHINE 300 NG/ML: NEGATIVE
MTD METHADONE 300 NG/ML: NEGATIVE
QXY OXYCODONE 100 NG/ML: NEGATIVE
THC 11-NOR-9-TETRAHYDROCANNABINOL-9-CARBOXYLIC ACID: NEGATIVE

## 2017-05-22 PROCEDURE — 99213 OFFICE O/P EST LOW 20 MIN: CPT | Mod: S$GLB,,, | Performed by: INTERNAL MEDICINE

## 2017-05-22 PROCEDURE — 25000003 PHARM REV CODE 250: Performed by: EMERGENCY MEDICINE

## 2017-05-22 PROCEDURE — 99285 EMERGENCY DEPT VISIT HI MDM: CPT | Mod: 25

## 2017-05-22 PROCEDURE — 63600175 PHARM REV CODE 636 W HCPCS: Performed by: EMERGENCY MEDICINE

## 2017-05-22 PROCEDURE — 63600175 PHARM REV CODE 636 W HCPCS: Performed by: NURSE PRACTITIONER

## 2017-05-22 PROCEDURE — 96374 THER/PROPH/DIAG INJ IV PUSH: CPT

## 2017-05-22 PROCEDURE — 25000003 PHARM REV CODE 250: Performed by: NURSE PRACTITIONER

## 2017-05-22 PROCEDURE — 20000000 HC ICU ROOM

## 2017-05-22 PROCEDURE — 25000003 PHARM REV CODE 250: Performed by: HOSPITALIST

## 2017-05-22 PROCEDURE — 3080F DIAST BP >= 90 MM HG: CPT | Mod: S$GLB,,, | Performed by: INTERNAL MEDICINE

## 2017-05-22 PROCEDURE — 1160F RVW MEDS BY RX/DR IN RCRD: CPT | Mod: S$GLB,,, | Performed by: INTERNAL MEDICINE

## 2017-05-22 PROCEDURE — 99499 UNLISTED E&M SERVICE: CPT | Mod: S$GLB,,, | Performed by: INTERNAL MEDICINE

## 2017-05-22 PROCEDURE — 3075F SYST BP GE 130 - 139MM HG: CPT | Mod: S$GLB,,, | Performed by: INTERNAL MEDICINE

## 2017-05-22 PROCEDURE — 96375 TX/PRO/DX INJ NEW DRUG ADDON: CPT

## 2017-05-22 RX ORDER — FENTANYL 25 UG/1
1 PATCH TRANSDERMAL
Status: DISCONTINUED | OUTPATIENT
Start: 2017-05-22 | End: 2017-05-24

## 2017-05-22 RX ORDER — FENTANYL 50 UG/1
1 PATCH TRANSDERMAL
Status: DISCONTINUED | OUTPATIENT
Start: 2017-05-22 | End: 2017-05-25

## 2017-05-22 RX ORDER — LABETALOL 100 MG/1
100 TABLET, FILM COATED ORAL 2 TIMES DAILY
Status: DISCONTINUED | OUTPATIENT
Start: 2017-05-22 | End: 2017-05-25

## 2017-05-22 RX ORDER — FENTANYL 50 UG/1
1 PATCH TRANSDERMAL
Status: DISCONTINUED | OUTPATIENT
Start: 2017-05-25 | End: 2017-05-22

## 2017-05-22 RX ORDER — PRASUGREL 5 MG/1
10 TABLET, FILM COATED ORAL DAILY
Status: DISCONTINUED | OUTPATIENT
Start: 2017-05-23 | End: 2017-05-27 | Stop reason: HOSPADM

## 2017-05-22 RX ORDER — LISINOPRIL 2.5 MG/1
5 TABLET ORAL DAILY
Status: DISCONTINUED | OUTPATIENT
Start: 2017-05-23 | End: 2017-05-27 | Stop reason: HOSPADM

## 2017-05-22 RX ORDER — PANTOPRAZOLE SODIUM 40 MG/1
40 TABLET, DELAYED RELEASE ORAL DAILY
Status: DISCONTINUED | OUTPATIENT
Start: 2017-05-23 | End: 2017-05-27 | Stop reason: HOSPADM

## 2017-05-22 RX ORDER — ASPIRIN 81 MG/1
81 TABLET ORAL DAILY
Status: DISCONTINUED | OUTPATIENT
Start: 2017-05-23 | End: 2017-05-27 | Stop reason: HOSPADM

## 2017-05-22 RX ORDER — LORAZEPAM 2 MG/ML
1 INJECTION INTRAMUSCULAR
Status: COMPLETED | OUTPATIENT
Start: 2017-05-22 | End: 2017-05-22

## 2017-05-22 RX ORDER — DEXMEDETOMIDINE HYDROCHLORIDE 4 UG/ML
0.2 INJECTION, SOLUTION INTRAVENOUS CONTINUOUS
Status: DISCONTINUED | OUTPATIENT
Start: 2017-05-22 | End: 2017-05-26

## 2017-05-22 RX ORDER — FENTANYL CITRATE 50 UG/ML
100 INJECTION, SOLUTION INTRAMUSCULAR; INTRAVENOUS
Status: COMPLETED | OUTPATIENT
Start: 2017-05-22 | End: 2017-05-22

## 2017-05-22 RX ORDER — SODIUM CHLORIDE 9 MG/ML
INJECTION, SOLUTION INTRAVENOUS CONTINUOUS
Status: DISCONTINUED | OUTPATIENT
Start: 2017-05-22 | End: 2017-05-24

## 2017-05-22 RX ORDER — FENTANYL 100 UG/H
1 PATCH TRANSDERMAL
Status: CANCELLED | OUTPATIENT
Start: 2017-05-22

## 2017-05-22 RX ORDER — LATANOPROST 50 UG/ML
1 SOLUTION/ DROPS OPHTHALMIC NIGHTLY
Status: DISCONTINUED | OUTPATIENT
Start: 2017-05-22 | End: 2017-05-27 | Stop reason: HOSPADM

## 2017-05-22 RX ORDER — VERAPAMIL HYDROCHLORIDE 240 MG/1
240 TABLET, FILM COATED, EXTENDED RELEASE ORAL NIGHTLY
Status: DISCONTINUED | OUTPATIENT
Start: 2017-05-22 | End: 2017-05-27 | Stop reason: HOSPADM

## 2017-05-22 RX ORDER — ATORVASTATIN CALCIUM 40 MG/1
40 TABLET, FILM COATED ORAL DAILY
Status: DISCONTINUED | OUTPATIENT
Start: 2017-05-23 | End: 2017-05-27 | Stop reason: HOSPADM

## 2017-05-22 RX ORDER — OXYCODONE HYDROCHLORIDE 5 MG/1
5 TABLET ORAL
Status: COMPLETED | OUTPATIENT
Start: 2017-05-22 | End: 2017-05-22

## 2017-05-22 RX ORDER — ONDANSETRON 2 MG/ML
4 INJECTION INTRAMUSCULAR; INTRAVENOUS EVERY 6 HOURS PRN
Status: DISCONTINUED | OUTPATIENT
Start: 2017-05-22 | End: 2017-05-23

## 2017-05-22 RX ORDER — BRIMONIDINE TARTRATE 1.5 MG/ML
1 SOLUTION/ DROPS OPHTHALMIC 2 TIMES DAILY
Status: DISCONTINUED | OUTPATIENT
Start: 2017-05-22 | End: 2017-05-27 | Stop reason: HOSPADM

## 2017-05-22 RX ORDER — BUPRENORPHINE AND NALOXONE 8; 2 MG/1; MG/1
1 FILM, SOLUBLE BUCCAL; SUBLINGUAL 2 TIMES DAILY
Qty: 2 PACKET | Refills: 0 | Status: ON HOLD | OUTPATIENT
Start: 2017-05-22 | End: 2017-05-27

## 2017-05-22 RX ORDER — ATORVASTATIN CALCIUM 40 MG/1
1 TABLET, FILM COATED ORAL DAILY
Status: ON HOLD | COMMUNITY
Start: 2017-04-28 | End: 2017-06-05

## 2017-05-22 RX ORDER — LORAZEPAM 0.5 MG/1
0.5 TABLET ORAL EVERY 6 HOURS PRN
Status: CANCELLED | OUTPATIENT
Start: 2017-05-22

## 2017-05-22 RX ADMIN — FENTANYL TRANSDERMAL 1 PATCH: 50 PATCH, EXTENDED RELEASE TRANSDERMAL at 11:05

## 2017-05-22 RX ADMIN — DEXMEDETOMIDINE HYDROCHLORIDE 0.5 MCG/KG/HR: 4 INJECTION, SOLUTION INTRAVENOUS at 11:05

## 2017-05-22 RX ADMIN — VERAPAMIL HYDROCHLORIDE 240 MG: 240 TABLET, FILM COATED, EXTENDED RELEASE ORAL at 11:05

## 2017-05-22 RX ADMIN — LORAZEPAM 1 MG: 2 INJECTION INTRAMUSCULAR; INTRAVENOUS at 09:05

## 2017-05-22 RX ADMIN — BRIMONIDINE TARTRATE 1 DROP: 1.5 SOLUTION OPHTHALMIC at 11:05

## 2017-05-22 RX ADMIN — LATANOPROST 1 DROP: 50 SOLUTION OPHTHALMIC at 11:05

## 2017-05-22 RX ADMIN — FENTANYL 1 PATCH: 25 PATCH, EXTENDED RELEASE TRANSDERMAL at 11:05

## 2017-05-22 RX ADMIN — FENTANYL CITRATE 100 MCG: 50 INJECTION INTRAMUSCULAR; INTRAVENOUS at 08:05

## 2017-05-22 RX ADMIN — ONDANSETRON 4 MG: 2 INJECTION INTRAMUSCULAR; INTRAVENOUS at 11:05

## 2017-05-22 RX ADMIN — LABETALOL HYDROCHLORIDE 100 MG: 100 TABLET, FILM COATED ORAL at 11:05

## 2017-05-22 RX ADMIN — OXYCODONE HYDROCHLORIDE 5 MG: 5 TABLET ORAL at 08:05

## 2017-05-22 RX ADMIN — SODIUM CHLORIDE: 0.9 INJECTION, SOLUTION INTRAVENOUS at 11:05

## 2017-05-22 NOTE — TELEPHONE ENCOUNTER
----- Message from Amaya Miller sent at 5/22/2017  3:11 PM CDT -----  [facility] [phone] is calling concerning his Suboxone 8/2 mg. Pharmacy is calling because patient is also on Fentanyl Patches. They are worried about an interaction. Please call back with advice or remit to:      [facility] - [place], [place] - [address] [facility] [address] [id number] [facility] [address]  [place] [address]  Phone: [phone] Fax: [phone]

## 2017-05-22 NOTE — PROGRESS NOTES
Subjective:       Patient ID: Benny Murray is a 66 y.o. male.    Chief Complaint: detox    HPI     Here for titration of suboxone. .  Off fetanyl for 24 hrs.     Clinical Opiate Withdrawal Scale (COWS)    PULSE RATE:                                                                 0  0=  <81   1 =          2 = 101-120   4 = > 120            Restlessness                                                                    1    0 = able to sit still               1 = reports difficulty sitting still, but is able to do so   3 = frequent shifting or extraneous movements of legs/arms  5 = Unable to sit still for more than a few seconds    Pupil size                                                                         2  0=normal or pinpoint  1 = possibly dilated  2=dilated  5= completely dilated.     New achiness                                                                  0  0=none  1=mild achiness  2= severe reported achiness  4=pt can't sit still due to achiness.     rhinorhia/tearing (not from cold)                                          0  0=none 1=nasal congestion/moist eyes   2=runny nose/tearing 4=nose/eyes pouring    GI Upset: Over Last 1/2 Hour                                             3                      0=none. 1=cramps 2=nausea/lose stool  3=vomiting/diarhia 5= multiple vomiting/diarhia      Tremor:                                                                            4            0=none 1=felt, not seen 2= mild 4=gross tremor/twitch    Yawning                                                                          1.    none=0  1=1-2x  2= >3 or more     4= >3/min    anxiety                                                                          2    0=none 1=reports anxiety 2=obvious anxiety  4=so anxious or irritable, that its hard to do interview    Gooseflesh                                                                    0  0=smooth skin 3=palbable  "piloerection  5=prominent piloerection                           score                              total                                       13      5-12 = Mild                                                        13-24 = Moderate  25-36 = Moderately Severe  More than 36 = Severe Withdrawal        After 4 mg suboxone pt is agitated and in much worse withdrawal.  Could not do cows because he was mad.  Advised not to take any more till the morning.   Review of Systems    Objective:      Vitals:    05/22/17 1355 05/22/17 1358   BP: (!) 139/95 (!) 132/98   Pulse: 75    Resp: 16    Temp: 98.3 °F (36.8 °C)    TempSrc: Oral    SpO2: 97%    Weight: 88.2 kg (194 lb 7.1 oz)    Height: 5' 9" (1.753 m)    PainSc:   6    PainLoc: Leg      Physical Exam      Assessment:       No diagnosis found.      Plan:   Clearly pt was not withdrawaing as much as he needed to be from fentanyl.  Pt considering going to the ER, but advised him there isn't much they can do .   There are no diagnoses linked to this encounter.  No Follow-up on file.      "

## 2017-05-22 NOTE — TELEPHONE ENCOUNTER
Patient just got patches filled on the 16th they are concerned about him using Suboxone with patches

## 2017-05-23 ENCOUNTER — TELEPHONE (OUTPATIENT)
Dept: FAMILY MEDICINE | Facility: CLINIC | Age: 67
End: 2017-05-23

## 2017-05-23 PROBLEM — I25.10 CORONARY ARTERY DISEASE INVOLVING NATIVE CORONARY ARTERY OF NATIVE HEART WITHOUT ANGINA PECTORIS: Status: ACTIVE | Noted: 2017-05-23

## 2017-05-23 PROBLEM — F11.93 OPIATE WITHDRAWAL: Status: ACTIVE | Noted: 2017-05-23

## 2017-05-23 LAB
ALBUMIN SERPL BCP-MCNC: 4 G/DL
ALP SERPL-CCNC: 140 U/L
ALT SERPL W/O P-5'-P-CCNC: 31 U/L
AMMONIA PLAS-SCNC: 34 UMOL/L
AMPHET+METHAMPHET UR QL: NEGATIVE
ANION GAP SERPL CALC-SCNC: 13 MMOL/L
AST SERPL-CCNC: 49 U/L
BARBITURATES UR QL SCN>200 NG/ML: NEGATIVE
BASOPHILS # BLD AUTO: 0 K/UL
BASOPHILS NFR BLD: 0.2 %
BENZODIAZ UR QL SCN>200 NG/ML: NEGATIVE
BILIRUB SERPL-MCNC: 0.9 MG/DL
BUN SERPL-MCNC: 18 MG/DL
BZE UR QL SCN: NEGATIVE
CALCIUM SERPL-MCNC: 9.2 MG/DL
CANNABINOIDS UR QL SCN: NEGATIVE
CHLORIDE SERPL-SCNC: 99 MMOL/L
CO2 SERPL-SCNC: 21 MMOL/L
CREAT SERPL-MCNC: 0.9 MG/DL
CREAT UR-MCNC: 62.9 MG/DL
DIFFERENTIAL METHOD: ABNORMAL
EOSINOPHIL # BLD AUTO: 0 K/UL
EOSINOPHIL NFR BLD: 0 %
ERYTHROCYTE [DISTWIDTH] IN BLOOD BY AUTOMATED COUNT: 13.1 %
EST. GFR  (AFRICAN AMERICAN): >60 ML/MIN/1.73 M^2
EST. GFR  (NON AFRICAN AMERICAN): >60 ML/MIN/1.73 M^2
GLUCOSE SERPL-MCNC: 124 MG/DL
HCT VFR BLD AUTO: 37.1 %
HGB BLD-MCNC: 12.2 G/DL
LYMPHOCYTES # BLD AUTO: 2.1 K/UL
LYMPHOCYTES NFR BLD: 17.9 %
MAGNESIUM SERPL-MCNC: 1.8 MG/DL
MCH RBC QN AUTO: 28.3 PG
MCHC RBC AUTO-ENTMCNC: 32.8 %
MCV RBC AUTO: 86 FL
METHADONE UR QL SCN>300 NG/ML: NEGATIVE
MONOCYTES # BLD AUTO: 0.3 K/UL
MONOCYTES NFR BLD: 2.4 %
NEUTROPHILS # BLD AUTO: 9.3 K/UL
NEUTROPHILS NFR BLD: 79.5 %
OPIATES UR QL SCN: NEGATIVE
PCP UR QL SCN>25 NG/ML: NEGATIVE
PHOSPHATE SERPL-MCNC: 3 MG/DL
PLATELET # BLD AUTO: 161 K/UL
PMV BLD AUTO: 7.1 FL
POTASSIUM SERPL-SCNC: 3.4 MMOL/L
PROT SERPL-MCNC: 7.8 G/DL
RBC # BLD AUTO: 4.3 M/UL
SODIUM SERPL-SCNC: 133 MMOL/L
TOXICOLOGY INFORMATION: NORMAL
WBC # BLD AUTO: 11.7 K/UL

## 2017-05-23 PROCEDURE — 20000000 HC ICU ROOM

## 2017-05-23 PROCEDURE — 84100 ASSAY OF PHOSPHORUS: CPT

## 2017-05-23 PROCEDURE — 63600175 PHARM REV CODE 636 W HCPCS: Performed by: NURSE PRACTITIONER

## 2017-05-23 PROCEDURE — 82570 ASSAY OF URINE CREATININE: CPT

## 2017-05-23 PROCEDURE — 85025 COMPLETE CBC W/AUTO DIFF WBC: CPT

## 2017-05-23 PROCEDURE — 80053 COMPREHEN METABOLIC PANEL: CPT

## 2017-05-23 PROCEDURE — 25000003 PHARM REV CODE 250: Performed by: NURSE PRACTITIONER

## 2017-05-23 PROCEDURE — 83735 ASSAY OF MAGNESIUM: CPT

## 2017-05-23 PROCEDURE — 36415 COLL VENOUS BLD VENIPUNCTURE: CPT

## 2017-05-23 PROCEDURE — 94761 N-INVAS EAR/PLS OXIMETRY MLT: CPT

## 2017-05-23 PROCEDURE — 82140 ASSAY OF AMMONIA: CPT

## 2017-05-23 PROCEDURE — 25000003 PHARM REV CODE 250: Performed by: HOSPITALIST

## 2017-05-23 RX ORDER — ONDANSETRON 2 MG/ML
8 INJECTION INTRAMUSCULAR; INTRAVENOUS EVERY 6 HOURS PRN
Status: DISCONTINUED | OUTPATIENT
Start: 2017-05-23 | End: 2017-05-27 | Stop reason: HOSPADM

## 2017-05-23 RX ORDER — LOPERAMIDE HYDROCHLORIDE 2 MG/1
2 CAPSULE ORAL
Status: DISCONTINUED | OUTPATIENT
Start: 2017-05-23 | End: 2017-05-27 | Stop reason: HOSPADM

## 2017-05-23 RX ORDER — NITROGLYCERIN 0.4 MG/1
0.4 TABLET SUBLINGUAL EVERY 5 MIN PRN
Status: DISCONTINUED | OUTPATIENT
Start: 2017-05-23 | End: 2017-05-27 | Stop reason: HOSPADM

## 2017-05-23 RX ORDER — POTASSIUM CHLORIDE 20 MEQ/15ML
60 SOLUTION ORAL
Status: DISCONTINUED | OUTPATIENT
Start: 2017-05-23 | End: 2017-05-27 | Stop reason: HOSPADM

## 2017-05-23 RX ORDER — FLUOXETINE HYDROCHLORIDE 20 MG/1
40 CAPSULE ORAL DAILY
Status: DISCONTINUED | OUTPATIENT
Start: 2017-05-24 | End: 2017-05-27 | Stop reason: HOSPADM

## 2017-05-23 RX ORDER — QUETIAPINE FUMARATE 25 MG/1
50 TABLET, FILM COATED ORAL NIGHTLY
Status: DISCONTINUED | OUTPATIENT
Start: 2017-05-23 | End: 2017-05-27 | Stop reason: HOSPADM

## 2017-05-23 RX ORDER — ENOXAPARIN SODIUM 100 MG/ML
40 INJECTION SUBCUTANEOUS EVERY 24 HOURS
Status: DISCONTINUED | OUTPATIENT
Start: 2017-05-23 | End: 2017-05-27 | Stop reason: HOSPADM

## 2017-05-23 RX ORDER — ACETAMINOPHEN 500 MG
1000 TABLET ORAL EVERY 6 HOURS PRN
Status: DISCONTINUED | OUTPATIENT
Start: 2017-05-23 | End: 2017-05-27 | Stop reason: HOSPADM

## 2017-05-23 RX ORDER — ZOLPIDEM TARTRATE 5 MG/1
5 TABLET ORAL NIGHTLY
Status: DISCONTINUED | OUTPATIENT
Start: 2017-05-23 | End: 2017-05-23

## 2017-05-23 RX ORDER — LANOLIN ALCOHOL/MO/W.PET/CERES
800 CREAM (GRAM) TOPICAL
Status: DISCONTINUED | OUTPATIENT
Start: 2017-05-23 | End: 2017-05-27 | Stop reason: HOSPADM

## 2017-05-23 RX ORDER — POTASSIUM CHLORIDE 20 MEQ/15ML
40 SOLUTION ORAL
Status: DISCONTINUED | OUTPATIENT
Start: 2017-05-23 | End: 2017-05-27 | Stop reason: HOSPADM

## 2017-05-23 RX ORDER — MAG HYDROX/ALUMINUM HYD/SIMETH 200-200-20
30 SUSPENSION, ORAL (FINAL DOSE FORM) ORAL EVERY 4 HOURS PRN
Status: DISCONTINUED | OUTPATIENT
Start: 2017-05-23 | End: 2017-05-27 | Stop reason: HOSPADM

## 2017-05-23 RX ORDER — RAMELTEON 8 MG/1
8 TABLET ORAL NIGHTLY
Status: DISCONTINUED | OUTPATIENT
Start: 2017-05-23 | End: 2017-05-27 | Stop reason: HOSPADM

## 2017-05-23 RX ORDER — IBUPROFEN 400 MG/1
800 TABLET ORAL EVERY 6 HOURS PRN
Status: DISCONTINUED | OUTPATIENT
Start: 2017-05-23 | End: 2017-05-27 | Stop reason: HOSPADM

## 2017-05-23 RX ADMIN — ASPIRIN 81 MG: 81 TABLET, COATED ORAL at 09:05

## 2017-05-23 RX ADMIN — PANTOPRAZOLE SODIUM 40 MG: 40 TABLET, DELAYED RELEASE ORAL at 09:05

## 2017-05-23 RX ADMIN — ATORVASTATIN CALCIUM 40 MG: 40 TABLET, FILM COATED ORAL at 09:05

## 2017-05-23 RX ADMIN — DEXMEDETOMIDINE HYDROCHLORIDE 0.6 MCG/KG/HR: 4 INJECTION, SOLUTION INTRAVENOUS at 09:05

## 2017-05-23 RX ADMIN — BRIMONIDINE TARTRATE 1 DROP: 1.5 SOLUTION OPHTHALMIC at 08:05

## 2017-05-23 RX ADMIN — ACETAMINOPHEN 1000 MG: 500 TABLET, FILM COATED ORAL at 11:05

## 2017-05-23 RX ADMIN — ALUMINUM HYDROXIDE, MAGNESIUM HYDROXIDE, AND SIMETHICONE 30 ML: 200; 200; 20 SUSPENSION ORAL at 10:05

## 2017-05-23 RX ADMIN — ONDANSETRON 4 MG: 2 INJECTION INTRAMUSCULAR; INTRAVENOUS at 09:05

## 2017-05-23 RX ADMIN — IBUPROFEN 800 MG: 400 TABLET ORAL at 06:05

## 2017-05-23 RX ADMIN — PRASUGREL HYDROCHLORIDE 10 MG: 10 TABLET, FILM COATED ORAL at 09:05

## 2017-05-23 RX ADMIN — VERAPAMIL HYDROCHLORIDE 240 MG: 240 TABLET, FILM COATED, EXTENDED RELEASE ORAL at 08:05

## 2017-05-23 RX ADMIN — SODIUM CHLORIDE: 0.9 INJECTION, SOLUTION INTRAVENOUS at 09:05

## 2017-05-23 RX ADMIN — DEXMEDETOMIDINE HYDROCHLORIDE 0.5 MCG/KG/HR: 4 INJECTION, SOLUTION INTRAVENOUS at 06:05

## 2017-05-23 RX ADMIN — ENOXAPARIN SODIUM 40 MG: 100 INJECTION SUBCUTANEOUS at 06:05

## 2017-05-23 RX ADMIN — QUETIAPINE FUMARATE 50 MG: 25 TABLET, FILM COATED ORAL at 08:05

## 2017-05-23 RX ADMIN — DEXMEDETOMIDINE HYDROCHLORIDE 0.7 MCG/KG/HR: 4 INJECTION, SOLUTION INTRAVENOUS at 01:05

## 2017-05-23 RX ADMIN — RAMELTEON 8 MG: 8 TABLET, FILM COATED ORAL at 08:05

## 2017-05-23 RX ADMIN — LABETALOL HYDROCHLORIDE 100 MG: 100 TABLET, FILM COATED ORAL at 09:05

## 2017-05-23 RX ADMIN — LATANOPROST 1 DROP: 50 SOLUTION OPHTHALMIC at 08:05

## 2017-05-23 RX ADMIN — ACETAMINOPHEN 1000 MG: 500 TABLET, FILM COATED ORAL at 08:05

## 2017-05-23 RX ADMIN — BRIMONIDINE TARTRATE 1 DROP: 1.5 SOLUTION OPHTHALMIC at 09:05

## 2017-05-23 RX ADMIN — LABETALOL HYDROCHLORIDE 100 MG: 100 TABLET, FILM COATED ORAL at 08:05

## 2017-05-23 RX ADMIN — LISINOPRIL 5 MG: 2.5 TABLET ORAL at 09:05

## 2017-05-23 NOTE — ED NOTES
MD at bedside for explanation of further care, pt verbalizes understanding and reports no further questions or complaints at this time. Awaiting further instructions

## 2017-05-23 NOTE — PROGRESS NOTES
Pt able to rest once the meds were able to take effect.  He admitted having almost no pain for several hours and was able to sleep.  His vitals have dramatically improved since admit.  He remains easily aroused.  Continuing to monitor.

## 2017-05-23 NOTE — ASSESSMENT & PLAN NOTE
S/p Harvoni 1/17-- HCV RNA undetected since that time with normal liver function testing.  Ammonia normal, follow CMP.  F/U with Dr. Lamb upon discharge.

## 2017-05-23 NOTE — ASSESSMENT & PLAN NOTE
Chronic without evidence of angina.  Monitor on tele, continue routine medications; ASA/Effiant/statin/BB.  Monitor closely for s/sx angina. PRN NTG.

## 2017-05-23 NOTE — SUBJECTIVE & OBJECTIVE
"Past Medical History:   Diagnosis Date    Anemia     Anxiety     Arthritis     Blood transfusion     x4    Cancer BASAL CELL CHEST - NEW DX    Cataract     ou done    Clotting disorder     Coronary artery disease     Depression     Heart abnormalities     Hypertension     Myocardial infarction 10 YRS AGO    silent    Osteoporosis     Wears glasses        Past Surgical History:   Procedure Laterality Date    APPENDECTOMY      Avastin os #2  8-    BACK SURGERY  2003    CATARACT EXTRACTION      os 12-13-12 od d 5-15-13//    EYE SURGERY  LEFT CATARACT 1/13    SPINE SURGERY      lumbar spine       Review of patient's allergies indicates:  No Known Allergies    No current facility-administered medications on file prior to encounter.      Current Outpatient Prescriptions on File Prior to Encounter   Medication Sig    aspirin (ECOTRIN) 81 MG EC tablet Take 81 mg by mouth once daily.     atorvastatin (LIPITOR) 40 MG tablet Take 1 tablet by mouth once daily.    BD LUER-RADHA SYRINGE 3 mL 23 x 1 1/2" Syrg     brimonidine 0.15 % OPTH DROP (ALPHAGAN) 0.15 % ophthalmic solution INSTILL 1 DROP INTO EACH EYE TWICE A DAY    buprenorphine-naloxone (SUBOXONE) 8-2 mg Film Place 1 packet (1 each total) under the tongue 2 (two) times daily.    EFFIENT 10 mg Tab Take 1 tablet by mouth once daily.    fluoxetine (PROZAC) 40 MG capsule Take 1 capsule (40 mg total) by mouth once daily.    labetalol (NORMODYNE) 100 MG tablet TAKE ONE TABLET BY MOUTH TWICE DAILY    latanoprost 0.005 % ophthalmic solution PLACE 1 DROP INTO THE RIGHT EYE EVERY EVENING    lisinopril (PRINIVIL,ZESTRIL) 5 MG tablet Take 1 tablet by mouth once daily.    pantoprazole (PROTONIX) 40 MG tablet Take 1 tablet by mouth once daily.    polyethylene glycol (GLYCOLAX) 17 gram/dose powder TAKE 17 GRAMS MIXED IN LIQUID ONCE DAILY    verapamil (CALAN-SR) 240 MG CR tablet TAKE 1 TABLET BY MOUTH ONCE q hs    zolpidem (AMBIEN) 5 MG Tab  "     Family History     Problem Relation (Age of Onset)    Cancer Maternal Aunt    Heart disease Mother, Father    Pancreatic cancer Sister        Social History Main Topics    Smoking status: Never Smoker    Smokeless tobacco: Not on file    Alcohol use No    Drug use: No    Sexual activity: Yes     Partners: Female     Review of Systems   Constitutional: Positive for diaphoresis. Negative for activity change, appetite change, chills and fever.   HENT: Negative for congestion, postnasal drip, sinus pressure and trouble swallowing.    Eyes: Negative for photophobia and visual disturbance.   Respiratory: Negative for cough, chest tightness, shortness of breath and wheezing.    Cardiovascular: Negative for chest pain, palpitations and leg swelling.   Gastrointestinal: Positive for diarrhea. Negative for abdominal distention, abdominal pain, nausea and vomiting.   Genitourinary: Negative for difficulty urinating, dysuria, flank pain and hematuria.   Musculoskeletal: Positive for back pain. Negative for arthralgias.   Skin: Negative for color change.   Neurological: Positive for dizziness, tremors and weakness. Negative for seizures, light-headedness and headaches.   Psychiatric/Behavioral: Positive for agitation. Negative for confusion, hallucinations and suicidal ideas. The patient is nervous/anxious.      Objective:     Vital Signs (Most Recent):  Temp: 99.2 °F (37.3 °C) (05/22/17 2345)  Pulse: 78 (05/23/17 0004)  Resp: (!) 26 (05/23/17 0004)  BP: (!) 168/99 (05/23/17 0004)  SpO2: 96 % (05/23/17 0004) Vital Signs (24h Range):  Temp:  [98.3 °F (36.8 °C)-99.2 °F (37.3 °C)] 99.2 °F (37.3 °C)  Pulse:  [75-90] 78  Resp:  [16-30] 26  SpO2:  [96 %-98 %] 96 %  BP: (132-172)/() 168/99     Weight: 85.8 kg (189 lb 2.5 oz)  Body mass index is 27.93 kg/m².    Physical Exam   Constitutional: He is oriented to person, place, and time. He appears well-developed and well-nourished. No distress.   HENT:   Head: Normocephalic  "and atraumatic.   Eyes: Conjunctivae and EOM are normal. Pupils are equal, round, and reactive to light.   Pupils 3 mm, equal.   Neck: Normal range of motion. Neck supple. No thyromegaly present.   Cardiovascular: Normal rate, regular rhythm, normal heart sounds and intact distal pulses.    No murmur heard.  Pulmonary/Chest: Effort normal and breath sounds normal. No respiratory distress.   Abdominal: Soft. Bowel sounds are normal. He exhibits no distension. There is no tenderness.   Musculoskeletal: Normal range of motion. He exhibits no edema.   Neurological: He is alert and oriented to person, place, and time. He displays normal reflexes. No cranial nerve deficit.   + tremulous; ataxic gait when ambulating.   Skin: Skin is warm and dry.   Psychiatric: Judgment and thought content normal.   Anxious, restless, fidgety, moving around in and out of bed during examination "feels like I'm crawling out of my skin."        Significant Labs: All pertinent labs within the past 24 hours have been reviewed.    "

## 2017-05-23 NOTE — ASSESSMENT & PLAN NOTE
Concern for some component of opiate withdrawal. Patient did not tolerate suboxone-- withdrawal possibly potentiated by suboxone administration? Discussed case with Dr. Melchor. Will place patient on fentanyl patch 75 mcg/hr, will titrate opiates appropriately to facilitate weaning.

## 2017-05-23 NOTE — ASSESSMENT & PLAN NOTE
Concern for serotonin syndrome in patient with extreme agitation, tremor, anxiety, and restlessness + hypertension after administration of suboxone to patient on prozac.  Uncertain if this is serotonin syndrome vs. Opiate withdrawal.  Will hold any further suboxone, hold SSRI.  Patient with no symptomatic response to IV ativan, will require ICU monitoring and IV precedex-- titrate for RASS 0. Discussed case with Dr. Melchor who will see the patient tomorrow.

## 2017-05-23 NOTE — PLAN OF CARE
Met with pt to complete his assessment.  Pt, who lives by himself, uses a straight cane to walk with.  He denies home health, denies driving and states that he takes a taxi whenever he needs transportation.  Pt's PCP is Dr. Dickson and his insurance is [employer].  Pt is requesting assistance with his hospital bill stating that he lives on social security.  Pt's discharge plan is home with no anticipated needs.    Updated Lacy financial counselor, who verified that she would go speak to pt.      05/23/17 1045   Discharge Assessment   Assessment Type Discharge Planning Assessment   Confirmed/corrected address and phone number on facesheet? Yes   Assessment information obtained from? Patient   Communicated expected length of stay with patient/caregiver no   Type of Healthcare Directive Received (Denies.  Pt is  with two children.  His daughter Aurora lives in [place] and son Shannan Murray lives in [place]. )   If Healthcare Directive is received, is it scanned into Epic? no (comment)   Prior to hospitilization cognitive status: Alert/Oriented   Prior to hospitalization functional status: Assistive Equipment  (strighte cane)   Current cognitive status: Alert/Oriented   Current Functional Status: Assistive Equipment   Arrived From home or self-care   Lives With alone   Able to Return to Prior Arrangements yes   Is patient able to care for self after discharge? Yes   How many people do you have in your home that can help with your care after discharge? 0   Who are your caregiver(s) and their phone number(s)? (daughter Aurora Murray, [phone])   Patient's perception of discharge disposition home or selfcare   Readmission Within The Last 30 Days current reason for admission unrelated to previous admission  (pt reports he was at [facility] 3.5 weeks ago and had two stents placed.   He denies any follow up with a cardiologist. )   Patient currently being followed by outpatient case management? No   Patient currently receives  home health services? No   Patient currently receives private duty nursing? Yes   Equipment Currently Used at Home cane, straight   Do you have any problems affording any of your prescribed medications? No  (pharmacy is [facility] in [place])   Is the patient taking medications as prescribed? yes   Do you have any financial concerns preventing you from receiving the healthcare you need? No   Does the patient have transportation to healthcare appointments? Yes  (pt does not drive and states that he takes taxis where ever he goes)   On Dialysis? No   Does the patient receive services at the Coumadin Clinic? No   Discharge Plan A Home   Patient/Family In Agreement With Plan yes

## 2017-05-23 NOTE — PLAN OF CARE
Problem: Patient Care Overview  Goal: Individualization & Mutuality  3926 pt admitted to icu from er,alert and oriented,vs stable,pt very anxious,states he has terrible muscle aches,treatment planned explained,Quinton RN assumed care of pt upon admission to icu

## 2017-05-23 NOTE — PLAN OF CARE
Problem: Fall Risk (Adult)  Goal: Identify Related Risk Factors and Signs and Symptoms  Related risk factors and signs and symptoms are identified upon initiation of Human Response Clinical Practice Guideline (CPG)   Outcome: Ongoing (interventions implemented as appropriate)  Patient safety maintained this shift. If patient ambulated, he would use a cane for balance. Is on bedrest at this time.     Problem: Patient Care Overview  Goal: Plan of Care Review  Outcome: Ongoing (interventions implemented as appropriate)  Care plan reviewed throughout the shift.  Will continue with current care plan.   Goal: Individualization & Mutuality  Outcome: Ongoing (interventions implemented as appropriate)  Patient states he will try and get through the withdrawal part, stating he wishes he had never put on a fentanyl patch the very first time.     Problem: Opioid Dependence/Withdrawal (Adult)  Goal: Withdrawal Symptoms Managed or Absent  Patient will demonstrate the desired outcomes by discharge/transition of care.   Outcome: Ongoing (interventions implemented as appropriate)  Patient experiencing withdrawal symptoms, slight shakiness, anxiety, restlessness, headache, and nausea.  Patient medicated with tylenol, zofran, maalox and is on precedex drip at 0.7/hour to keep patient calm. Precedex was titrated up due to the above symptoms.     Problem: Pressure Ulcer Risk (Marvin Scale) (Adult,Obstetrics,Pediatric)  Goal: Identify Related Risk Factors and Signs and Symptoms  Related risk factors and signs and symptoms are identified upon initiation of Human Response Clinical Practice Guideline (CPG)   Outcome: Ongoing (interventions implemented as appropriate)  Patient does reposition self frequently on a specialty bed.

## 2017-05-23 NOTE — TELEPHONE ENCOUNTER
----- Message from Mary Miranda sent at 5/23/2017  4:45 PM CDT -----  Patient states that he is in [facility] hospital.  Any questions call [phone]

## 2017-05-23 NOTE — HPI
"Benny Murray is a 66 y.o. male with HTN, CAD s/p stent placement, and chronic left sciatica.  He was admitted to the service of hospital medicine with concern for serotonin syndrome.  He presented to the ED with self- explained worsening withdrawal symptoms including tremor, diarrhea, agitation, and extreme restlessness. The symptoms Monday morning. He reports transitioning off Fentanyl 150 mg patches for the last 6 years (removed patch Sunday) with the assistance of Dr. Dickson.  He began experiencing his symptoms on Monday AM, he reported to Dr. Dickson's office and was given Suboxone 2 mg in his office.  At this time his symptoms worsened and reaction is well-documented by Dr. Dickson who felt the patient did not wean off his narcotics as directed. His symptoms have worsened since that time.  In the ED he was found to be hypertensive with anxiety, restlessness, and tremor. He was given IV fentanyl, oral oxycodone without improvement in symptoms. He was given 1 mg IV ativan without result.  Given the patient's use of prozac + suboxone, ED physician was concerned for serotonin syndrome vs. Opiate withdrawal.  The patient reports cont'd symptoms upon examination stating "I can't take this."  He states he has never weaned off of narcotics before and has been on them for 11 years.  He states he wanted to wean because his orthopedic/pain clinic would no longer be operating as a pain clinic and were not helpful in assisting him to find pain management.  He denies any chest pain, SOB, nausea, vomiting, fever, or chills.  He does endorse diarrhea, restless legs, tremor, sweating, and extreme anxiety.  He denies any illicit drug use, no change in medication other than that reported. Other pertinent medical history as below:  "

## 2017-05-23 NOTE — ED NOTES
Patient identifiers for Benny Murray checked and correct.  LOC: Patient is awake, alert, and aware of environment with an appropriate affect. Patient is oriented x 3 and speaking appropriately.  APPEARANCE: Patient appears uncomfortable, restless legs, anxious and also yells intermittently. Patient is clean and well groomed, patient's clothing is properly fastened.  SKIN: The skin is warm and dry. Patient has normal skin turgor and moist mucus membrances. Skin is intact; no bruising or breakdown noted.  MUSCULOSKELETAL: Patient is moving all extremities well, no obvious deformities noted. Pulses intact.   RESPIRATORY: Airway is open and patent. Respirations are spontaneous and non-labored with normal effort and rate.  CARDIAC: Patient has a normal rate and rhythm. No peripheral edema noted. Capillary refill < 3 seconds.  ABDOMEN: No distention noted. Bowel sounds active in all 4 quadrants. Soft and non-tender upon palpation.  NEUROLOGICAL: PERRL. Facial expression is symmetrical. Hand grasps are equal bilaterally. Normal sensation in all extremities when touched with finger. Requires coaching during neuro exam because he gets very upset  Allergies reported: Review of patient's allergies indicates:  No Known Allergies

## 2017-05-23 NOTE — ASSESSMENT & PLAN NOTE
Chronic, holding SSRi with concern for serotonin syndrome. Monitor patient closely, may resume SSRI as appropriate.

## 2017-05-23 NOTE — PROGRESS NOTES
05/23/17 0004   Patient Assessment/Suction   Level of Consciousness (AVPU) alert   Respiratory Effort Unlabored   PRE-TX-O2-ETCO2   O2 Device (Oxygen Therapy) room air   SpO2 96 %   Pulse Oximetry Type Continuous   $ Pulse Oximetry - Multiple Charge Pulse Oximetry - Multiple   Pulse 78   Resp (!) 26   BP (!) 168/99

## 2017-05-23 NOTE — H&P
"[facility] Medicine  History & Physical    Patient Name: Benny Murray  MRN: [id number]  Admission Date: 5/22/2017  Attending Physician: Ketan Melchor MD   Primary Care Provider: Valentino Dickson MD         Patient information was obtained from patient and ER records.     Subjective:     Principal Problem:Serotonin syndrome    Chief Complaint:   Chief Complaint   Patient presents with    Withdrawal     from fentanyl. c/o dizziness, weakness. took suboxone this morning that his doctor gave him this morning. had an MI three weeks ago. last had fentanyl yestesterday. has been on fentanyl for ten years for chronic back pain        HPI: Benny Murray is a 66 y.o. male with HTN, CAD s/p stent placement, and chronic left sciatica.  He was admitted to the service of hospital medicine with concern for serotonin syndrome.  He presented to the ED with self- explained worsening withdrawal symptoms including tremor, diarrhea, agitation, and extreme restlessness. The symptoms Monday morning. He reports transitioning off Fentanyl 150 mg patches for the last 6 years (removed patch Sunday) with the assistance of Dr. Dickson.  He began experiencing his symptoms on Monday AM, he reported to Dr. Dickson's office and was given Suboxone 2 mg in his office.  At this time his symptoms worsened and reaction is well-documented by Dr. Dikcson who felt the patient did not wean off his narcotics as directed. His symptoms have worsened since that time.  In the ED he was found to be hypertensive with anxiety, restlessness, and tremor. He was given IV fentanyl, oral oxycodone without improvement in symptoms. He was given 1 mg IV ativan without result.  Given the patient's use of prozac + suboxone, ED physician was concerned for serotonin syndrome vs. Opiate withdrawal.  The patient reports cont'd symptoms upon examination stating "I can't take this."  He states he has never weaned off of narcotics before and has been on them " "for 11 years.  He states he wanted to wean because his orthopedic/pain clinic would no longer be operating as a pain clinic and were not helpful in assisting him to find pain management.  He denies any chest pain, SOB, nausea, vomiting, fever, or chills.  He does endorse diarrhea, restless legs, tremor, sweating, and extreme anxiety.  He denies any illicit drug use, no change in medication other than that reported. Other pertinent medical history as below:    Past Medical History:   Diagnosis Date    Anemia     Anxiety     Arthritis     Blood transfusion     x4    Cancer BASAL CELL CHEST - NEW DX    Cataract     ou done    Clotting disorder     Coronary artery disease     Depression     Heart abnormalities     Hypertension     Myocardial infarction 10 YRS AGO    silent    Osteoporosis     Wears glasses        Past Surgical History:   Procedure Laterality Date    APPENDECTOMY      Avastin os #2  8-    BACK SURGERY  2003    CATARACT EXTRACTION      os 12-13-12 od d 5-15-13//    EYE SURGERY  LEFT CATARACT 1/13    SPINE SURGERY      lumbar spine       Review of patient's allergies indicates:  No Known Allergies    No current facility-administered medications on file prior to encounter.      Current Outpatient Prescriptions on File Prior to Encounter   Medication Sig    aspirin (ECOTRIN) 81 MG EC tablet Take 81 mg by mouth once daily.     atorvastatin (LIPITOR) 40 MG tablet Take 1 tablet by mouth once daily.    BD LUER-RADHA SYRINGE 3 mL 23 x 1 1/2" Syrg     brimonidine 0.15 % OPTH DROP (ALPHAGAN) 0.15 % ophthalmic solution INSTILL 1 DROP INTO EACH EYE TWICE A DAY    buprenorphine-naloxone (SUBOXONE) 8-2 mg Film Place 1 packet (1 each total) under the tongue 2 (two) times daily.    EFFIENT 10 mg Tab Take 1 tablet by mouth once daily.    fluoxetine (PROZAC) 40 MG capsule Take 1 capsule (40 mg total) by mouth once daily.    labetalol (NORMODYNE) 100 MG tablet TAKE ONE TABLET BY MOUTH TWICE " DAILY    latanoprost 0.005 % ophthalmic solution PLACE 1 DROP INTO THE RIGHT EYE EVERY EVENING    lisinopril (PRINIVIL,ZESTRIL) 5 MG tablet Take 1 tablet by mouth once daily.    pantoprazole (PROTONIX) 40 MG tablet Take 1 tablet by mouth once daily.    polyethylene glycol (GLYCOLAX) 17 gram/dose powder TAKE 17 GRAMS MIXED IN LIQUID ONCE DAILY    verapamil (CALAN-SR) 240 MG CR tablet TAKE 1 TABLET BY MOUTH ONCE q hs    zolpidem (AMBIEN) 5 MG Tab      Family History     Problem Relation (Age of Onset)    Cancer Maternal Aunt    Heart disease Mother, Father    Pancreatic cancer Sister        Social History Main Topics    Smoking status: Never Smoker    Smokeless tobacco: Not on file    Alcohol use No    Drug use: No    Sexual activity: Yes     Partners: Female     Review of Systems   Constitutional: Positive for diaphoresis. Negative for activity change, appetite change, chills and fever.   HENT: Negative for congestion, postnasal drip, sinus pressure and trouble swallowing.    Eyes: Negative for photophobia and visual disturbance.   Respiratory: Negative for cough, chest tightness, shortness of breath and wheezing.    Cardiovascular: Negative for chest pain, palpitations and leg swelling.   Gastrointestinal: Positive for diarrhea. Negative for abdominal distention, abdominal pain, nausea and vomiting.   Genitourinary: Negative for difficulty urinating, dysuria, flank pain and hematuria.   Musculoskeletal: Positive for back pain. Negative for arthralgias.   Skin: Negative for color change.   Neurological: Positive for dizziness, tremors and weakness. Negative for seizures, light-headedness and headaches.   Psychiatric/Behavioral: Positive for agitation. Negative for confusion, hallucinations and suicidal ideas. The patient is nervous/anxious.      Objective:     Vital Signs (Most Recent):  Temp: 99.2 °F (37.3 °C) (05/22/17 2345)  Pulse: 78 (05/23/17 0004)  Resp: (!) 26 (05/23/17 0004)  BP: (!) 168/99  "(05/23/17 0004)  SpO2: 96 % (05/23/17 0004) Vital Signs (24h Range):  Temp:  [98.3 °F (36.8 °C)-99.2 °F (37.3 °C)] 99.2 °F (37.3 °C)  Pulse:  [75-90] 78  Resp:  [16-30] 26  SpO2:  [96 %-98 %] 96 %  BP: (132-172)/() 168/99     Weight: 85.8 kg (189 lb 2.5 oz)  Body mass index is 27.93 kg/m².    Physical Exam   Constitutional: He is oriented to person, place, and time. He appears well-developed and well-nourished. No distress.   HENT:   Head: Normocephalic and atraumatic.   Eyes: Conjunctivae and EOM are normal. Pupils are equal, round, and reactive to light.   Pupils 3 mm, equal.   Neck: Normal range of motion. Neck supple. No thyromegaly present.   Cardiovascular: Normal rate, regular rhythm, normal heart sounds and intact distal pulses.    No murmur heard.  Pulmonary/Chest: Effort normal and breath sounds normal. No respiratory distress.   Abdominal: Soft. Bowel sounds are normal. He exhibits no distension. There is no tenderness.   Musculoskeletal: Normal range of motion. He exhibits no edema.   Neurological: He is alert and oriented to person, place, and time. He displays normal reflexes. No cranial nerve deficit. No clonus, no hyperreflexia elicited.   + tremulous; ataxic gait when ambulating.   Skin: Skin is warm and dry.   Psychiatric: Judgment and thought content normal.   Anxious, restless, fidgety, moving around in and out of bed during examination "feels like I'm crawling out of my skin."       Significant Labs: All pertinent labs within the past 24 hours have been reviewed.      Assessment/Plan:     * Serotonin syndrome    Concern for serotonin syndrome in patient with extreme agitation, tremor, anxiety, and restlessness + hypertension after administration of suboxone to patient on prozac.  Uncertain if this is serotonin syndrome vs. Opiate withdrawal.  Will hold any further suboxone, hold SSRI.  Patient with no symptomatic response to IV ativan, will require ICU monitoring and IV precedex-- titrate " for RASS 0. Discussed case with Dr. Melchor who will see the patient tomorrow.          Opiate withdrawal    Concern for some component of opiate withdrawal. Patient did not tolerate suboxone-- withdrawal possibly potentiated by suboxone administration? Discussed case with Dr. Melchor. Will place patient on fentanyl patch 75 mcg/hr, will titrate opiates appropriately to facilitate weaning.          Recurrent major depressive disorder    Chronic, holding SSRi with concern for serotonin syndrome. Monitor patient closely, may resume SSRI as appropriate.          Coronary artery disease involving native coronary artery of native heart without angina pectoris    Chronic without evidence of angina.  Monitor on tele, continue routine medications; ASA/Effiant/statin/BB.  Monitor closely for s/sx angina. PRN NTG.          DDD (degenerative disc disease), lumbosacral    With chronic pain and long-standing opioid dependence.  Weaning as above.          Hypertensive retinopathy of both eyes    Chronic, continue home eye gtt medications.          Chronic hepatitis C    S/p Harvoni 1/17-- HCV RNA undetected since that time with normal liver function testing.  Ammonia normal, follow CMP.  F/U with Dr. Lamb upon discharge.            VTE Risk Mitigation         Ordered     enoxaparin injection 40 mg  Daily     Route:  Subcutaneous        05/23/17 0314     Medium Risk of VTE  Once      05/23/17 0314        Lorraine Arteaga NP  Department of Hospital Medicine   [facility]

## 2017-05-23 NOTE — ED PROVIDER NOTES
Encounter Date: 5/22/2017    SCRIBE #1 NOTE: I, Olga Gonzalez, am scribing for, and in the presence of, Dr. Valladares.       History     Chief Complaint   Patient presents with    Withdrawal     from fentanyl. c/o dizziness, weakness. took suboxone this morning that his doctor gave him this morning. had an MI three weeks ago. last had fentanyl yestesterday. has been on fentanyl for ten years for chronic back pain     Review of patient's allergies indicates:  No Known Allergies    05/22/2017 8:01 PM     Chief Complaint: Withdrawal      The patient is a 66 y.o. male with HTN, MI, CAD, and chronic left sciatica who presents to the ED with withdrawal symptoms including shakes and diarrhea. The symptoms began yesterday morning. He reports transitioning off Fentanyl 150 mg patches for the last 3 years (replacing every 2 days) to Suboxone 2 mg by his PCP, Dr. Valentino Dickson. His last dose of Suboxone was 5-6 hours ago. He denies any other symptoms at this time. **No pertinent SHx noted. No SHx noted.   Suboxone.       The history is provided by the patient.     Past Medical History:   Diagnosis Date    Anemia     Anxiety     Arthritis     Blood transfusion     x4    Cancer BASAL CELL CHEST - NEW DX    Cataract     ou done    Clotting disorder     Coronary artery disease     Depression     Heart abnormalities     Hypertension     Myocardial infarction 10 YRS AGO    silent    Osteoporosis     Wears glasses      Past Surgical History:   Procedure Laterality Date    APPENDECTOMY      Avastin os #2  8-    BACK SURGERY  2003    CATARACT EXTRACTION      os 12-13-12 od d 5-15-13//    EYE SURGERY  LEFT CATARACT 1/13    SPINE SURGERY      lumbar spine     Family History   Problem Relation Age of Onset    Heart disease Mother     Heart disease Father     Cancer Maternal Aunt      lymphoma    Pancreatic cancer Sister     Amblyopia Neg Hx     Blindness Neg Hx     Cataracts Neg Hx     Diabetes Neg Hx      Glaucoma Neg Hx     Hypertension Neg Hx     Macular degeneration Neg Hx     Retinal detachment Neg Hx     Strabismus Neg Hx     Stroke Neg Hx     Thyroid disease Neg Hx      Social History   Substance Use Topics    Smoking status: Never Smoker    Smokeless tobacco: Not on file    Alcohol use No     Review of Systems   Constitutional: Negative for chills and fever.   HENT: Negative for nosebleeds.    Eyes: Negative for visual disturbance.   Respiratory: Negative for cough and shortness of breath.    Cardiovascular: Negative for chest pain and palpitations.   Gastrointestinal: Positive for diarrhea. Negative for abdominal pain, nausea and vomiting.   Genitourinary: Negative for dysuria and hematuria.   Musculoskeletal: Negative for back pain and neck pain.   Skin: Negative for rash.   Neurological: Positive for tremors (withdrawal shakes). Negative for seizures, syncope and headaches.     Physical Exam     Initial Vitals [05/22/17 1938]   BP Pulse Resp Temp SpO2   (!) 172/101 89 (!) 22 98.8 °F (37.1 °C) 98 %     Physical Exam    Nursing note and vitals reviewed.  Constitutional: He appears well-developed and well-nourished. He is not diaphoretic. No distress.   HENT:   Head: Normocephalic and atraumatic.   Mouth/Throat: Oropharynx is clear and moist.   Eyes: Conjunctivae are normal.   3 mm pupils, bilaterally.    Neck: Neck supple.   Cardiovascular: Normal rate, regular rhythm, normal heart sounds and intact distal pulses. Exam reveals no gallop and no friction rub.    No murmur heard.  Pulmonary/Chest: Breath sounds normal. He has no wheezes. He has no rhonchi. He has no rales.   Abdominal: Soft. He exhibits no distension. There is no tenderness.   Musculoskeletal: Normal range of motion.   Neurological: He is alert and oriented to person, place, and time.   Patient is tremulous.    Skin: No rash noted. No erythema.   Psychiatric: He has a normal mood and affect. His behavior is normal. Judgment and  thought content normal.       ED Course   Procedures  Labs Reviewed - No data to display        Medical Decision Making:   History:   Old Medical Records: I decided to obtain old medical records.            Scribe Attestation:   Scribe #1: I performed the above scribed service and the documentation accurately describes the services I performed. I attest to the accuracy of the note.    Attending Attestation:           Physician Attestation for Scribe:  Physician Attestation Statement for Scribe #1: I, Dr. Valladares, reviewed documentation, as scribed by Olga Gonzalez in my presence, and it is both accurate and complete.         Benny Murray is a 66 y.o. male presenting with nonspecific symptoms primary among those his tremulousness.  Patient recently initiated on Suboxone following discontinuation of fentanyl.  Initial concern for withdrawal with test dose of oral oxycodone and IV fentanyl given without change in symptoms.  Much lower suspicion for withdrawal as this should have improved if not resolved the majority of his symptoms.  Patient notably on fluoxetine prior to initiation of buprenorphine today.  I suspect mild serotonin syndrome.  Patient not persistently tachycardic, altered, or febrile.  Based on symptoms, I will admit to hospital with IV Ativan given in the emergency department.  Further treatment at admitting team discretion.  I have spoken with the hospitalist service who will assume care.        ED Course     Clinical Impression:     1. Serotonin syndrome    2. CAD (coronary artery disease)                Erwin Valladares MD  05/23/17 0354

## 2017-05-24 LAB
ANION GAP SERPL CALC-SCNC: 8 MMOL/L
BASOPHILS # BLD AUTO: 0 K/UL
BASOPHILS NFR BLD: 0.3 %
BUN SERPL-MCNC: 21 MG/DL
CALCIUM SERPL-MCNC: 8.5 MG/DL
CHLORIDE SERPL-SCNC: 108 MMOL/L
CO2 SERPL-SCNC: 21 MMOL/L
CREAT SERPL-MCNC: 0.9 MG/DL
DIFFERENTIAL METHOD: ABNORMAL
EOSINOPHIL # BLD AUTO: 0.1 K/UL
EOSINOPHIL NFR BLD: 0.7 %
ERYTHROCYTE [DISTWIDTH] IN BLOOD BY AUTOMATED COUNT: 13.1 %
EST. GFR  (AFRICAN AMERICAN): >60 ML/MIN/1.73 M^2
EST. GFR  (NON AFRICAN AMERICAN): >60 ML/MIN/1.73 M^2
GLUCOSE SERPL-MCNC: 104 MG/DL
HCT VFR BLD AUTO: 35.1 %
HGB BLD-MCNC: 11.7 G/DL
LYMPHOCYTES # BLD AUTO: 2.7 K/UL
LYMPHOCYTES NFR BLD: 30.2 %
MAGNESIUM SERPL-MCNC: 2 MG/DL
MCH RBC QN AUTO: 28.8 PG
MCHC RBC AUTO-ENTMCNC: 33.2 %
MCV RBC AUTO: 87 FL
MONOCYTES # BLD AUTO: 0.7 K/UL
MONOCYTES NFR BLD: 7.7 %
NEUTROPHILS # BLD AUTO: 5.5 K/UL
NEUTROPHILS NFR BLD: 61.1 %
PHOSPHATE SERPL-MCNC: 3 MG/DL
PLATELET # BLD AUTO: 161 K/UL
PMV BLD AUTO: 7.5 FL
POTASSIUM SERPL-SCNC: 3.6 MMOL/L
RBC # BLD AUTO: 4.06 M/UL
SODIUM SERPL-SCNC: 137 MMOL/L
WBC # BLD AUTO: 9 K/UL

## 2017-05-24 PROCEDURE — 80048 BASIC METABOLIC PNL TOTAL CA: CPT

## 2017-05-24 PROCEDURE — 94761 N-INVAS EAR/PLS OXIMETRY MLT: CPT

## 2017-05-24 PROCEDURE — 85025 COMPLETE CBC W/AUTO DIFF WBC: CPT

## 2017-05-24 PROCEDURE — 63600175 PHARM REV CODE 636 W HCPCS: Performed by: NURSE PRACTITIONER

## 2017-05-24 PROCEDURE — 25000003 PHARM REV CODE 250: Performed by: HOSPITALIST

## 2017-05-24 PROCEDURE — 20000000 HC ICU ROOM

## 2017-05-24 PROCEDURE — 84100 ASSAY OF PHOSPHORUS: CPT

## 2017-05-24 PROCEDURE — 36415 COLL VENOUS BLD VENIPUNCTURE: CPT

## 2017-05-24 PROCEDURE — 25000003 PHARM REV CODE 250: Performed by: NURSE PRACTITIONER

## 2017-05-24 PROCEDURE — 83735 ASSAY OF MAGNESIUM: CPT

## 2017-05-24 RX ORDER — HYDRALAZINE HYDROCHLORIDE 20 MG/ML
10 INJECTION INTRAMUSCULAR; INTRAVENOUS EVERY 6 HOURS PRN
Status: DISCONTINUED | OUTPATIENT
Start: 2017-05-25 | End: 2017-05-25

## 2017-05-24 RX ORDER — HALOPERIDOL 1 MG/1
2 TABLET ORAL
Status: DISCONTINUED | OUTPATIENT
Start: 2017-05-24 | End: 2017-05-27 | Stop reason: HOSPADM

## 2017-05-24 RX ORDER — DIPHENHYDRAMINE HCL 25 MG
25 CAPSULE ORAL NIGHTLY PRN
Status: DISCONTINUED | OUTPATIENT
Start: 2017-05-24 | End: 2017-05-27 | Stop reason: HOSPADM

## 2017-05-24 RX ADMIN — LISINOPRIL 5 MG: 2.5 TABLET ORAL at 11:05

## 2017-05-24 RX ADMIN — LATANOPROST 1 DROP: 50 SOLUTION OPHTHALMIC at 09:05

## 2017-05-24 RX ADMIN — DEXMEDETOMIDINE HYDROCHLORIDE 0.7 MCG/KG/HR: 4 INJECTION, SOLUTION INTRAVENOUS at 05:05

## 2017-05-24 RX ADMIN — VERAPAMIL HYDROCHLORIDE 240 MG: 240 TABLET, FILM COATED, EXTENDED RELEASE ORAL at 09:05

## 2017-05-24 RX ADMIN — PRASUGREL HYDROCHLORIDE 10 MG: 10 TABLET, FILM COATED ORAL at 11:05

## 2017-05-24 RX ADMIN — DEXMEDETOMIDINE HYDROCHLORIDE 0.45 MCG/KG/HR: 4 INJECTION, SOLUTION INTRAVENOUS at 10:05

## 2017-05-24 RX ADMIN — BRIMONIDINE TARTRATE 1 DROP: 1.5 SOLUTION OPHTHALMIC at 09:05

## 2017-05-24 RX ADMIN — LABETALOL HYDROCHLORIDE 100 MG: 100 TABLET, FILM COATED ORAL at 09:05

## 2017-05-24 RX ADMIN — ASPIRIN 81 MG: 81 TABLET, COATED ORAL at 11:05

## 2017-05-24 RX ADMIN — ENOXAPARIN SODIUM 40 MG: 100 INJECTION SUBCUTANEOUS at 05:05

## 2017-05-24 RX ADMIN — LOPERAMIDE HYDROCHLORIDE 2 MG: 2 CAPSULE ORAL at 12:05

## 2017-05-24 RX ADMIN — LOPERAMIDE HYDROCHLORIDE 2 MG: 2 CAPSULE ORAL at 05:05

## 2017-05-24 RX ADMIN — RAMELTEON 8 MG: 8 TABLET, FILM COATED ORAL at 09:05

## 2017-05-24 RX ADMIN — LABETALOL HYDROCHLORIDE 100 MG: 100 TABLET, FILM COATED ORAL at 11:05

## 2017-05-24 RX ADMIN — SODIUM CHLORIDE: 0.9 INJECTION, SOLUTION INTRAVENOUS at 05:05

## 2017-05-24 RX ADMIN — DIPHENHYDRAMINE HYDROCHLORIDE 25 MG: 25 CAPSULE ORAL at 11:05

## 2017-05-24 RX ADMIN — ACETAMINOPHEN 1000 MG: 500 TABLET, FILM COATED ORAL at 02:05

## 2017-05-24 RX ADMIN — DIPHENHYDRAMINE HYDROCHLORIDE 25 MG: 25 CAPSULE ORAL at 01:05

## 2017-05-24 RX ADMIN — ATORVASTATIN CALCIUM 40 MG: 40 TABLET, FILM COATED ORAL at 11:05

## 2017-05-24 RX ADMIN — PANTOPRAZOLE SODIUM 40 MG: 40 TABLET, DELAYED RELEASE ORAL at 11:05

## 2017-05-24 RX ADMIN — ACETAMINOPHEN 1000 MG: 500 TABLET, FILM COATED ORAL at 11:05

## 2017-05-24 RX ADMIN — LOPERAMIDE HYDROCHLORIDE 2 MG: 2 CAPSULE ORAL at 10:05

## 2017-05-24 RX ADMIN — LOPERAMIDE HYDROCHLORIDE 2 MG: 2 CAPSULE ORAL at 02:05

## 2017-05-24 RX ADMIN — FLUOXETINE 40 MG: 20 CAPSULE ORAL at 11:05

## 2017-05-24 RX ADMIN — QUETIAPINE FUMARATE 50 MG: 25 TABLET, FILM COATED ORAL at 09:05

## 2017-05-24 NOTE — ASSESSMENT & PLAN NOTE
Chronic problem. Will continue chronic medication(s), SSRI and monitor for any changes, adjusting as needed.

## 2017-05-24 NOTE — ASSESSMENT & PLAN NOTE
Noted opiate withdrawal, managed by precedex. COntinue symptom control, wean narcotics slowly and hopefully transition out of ICU in next 1-2 days.

## 2017-05-24 NOTE — PLAN OF CARE
Problem: Patient Care Overview  Goal: Plan of Care Review  Outcome: Ongoing (interventions implemented as appropriate)  Shannan CRUZN

## 2017-05-24 NOTE — PROGRESS NOTES
"[facility] Medicine  Progress Note    Patient Name: Benny Murray  MRN: [id number]  Patient Class: IP- Inpatient   Admission Date: 5/22/2017  Length of Stay: 1 days  Attending Physician: Ketan Melchor MD  Primary Care Provider: Valentino Dickson MD        Subjective:     Principal Problem:Opiate withdrawal    HPI:  Benny Murray is a 66 y.o. male with HTN, CAD s/p stent placement, and chronic left sciatica.  He was admitted to the service of hospital medicine with concern for serotonin syndrome.  He presented to the ED with self- explained worsening withdrawal symptoms including tremor, diarrhea, agitation, and extreme restlessness. The symptoms Monday morning. He reports transitioning off Fentanyl 150 mg patches for the last 6 years (removed patch Sunday) with the assistance of Dr. Dickson.  He began experiencing his symptoms on Monday AM, he reported to Dr. Dickson's office and was given Suboxone 2 mg in his office.  At this time his symptoms worsened and reaction is well-documented by Dr. Dickson who felt the patient did not wean off his narcotics as directed. His symptoms have worsened since that time.  In the ED he was found to be hypertensive with anxiety, restlessness, and tremor. He was given IV fentanyl, oral oxycodone without improvement in symptoms. He was given 1 mg IV ativan without result.  Given the patient's use of prozac + suboxone, ED physician was concerned for serotonin syndrome vs. Opiate withdrawal.  The patient reports cont'd symptoms upon examination stating "I can't take this."  He states he has never weaned off of narcotics before and has been on them for 11 years.  He states he wanted to wean because his orthopedic/pain clinic would no longer be operating as a pain clinic and were not helpful in assisting him to find pain management.  He denies any chest pain, SOB, nausea, vomiting, fever, or chills.  He does endorse diarrhea, restless legs, tremor, sweating, and extreme " anxiety.  He denies any illicit drug use, no change in medication other than that reported. Other pertinent medical history as below:    Hospital Course:  No notes on file    Interval History: Patient remains symptomatic from withdrawals- + severe nausea, pain and malaise. Discussed with him at length that he will be uncomfortable and this is part of the withdrawal process.    Review of Systems   Constitutional: Positive for chills and fatigue. Negative for fever.   Respiratory: Negative for cough and shortness of breath.    Cardiovascular: Negative for chest pain and leg swelling.   Gastrointestinal: Positive for abdominal pain, nausea and vomiting.   Musculoskeletal: Negative for back pain.   Neurological: Negative for weakness.   Psychiatric/Behavioral: Positive for agitation and behavioral problems. Negative for confusion. The patient is nervous/anxious.      Objective:     Vital Signs (Most Recent):  Temp: 97.9 °F (36.6 °C) (05/23/17 1530)  Pulse: 66 (05/23/17 1929)  Resp: 17 (05/23/17 1900)  BP: 128/82 (05/23/17 1900)  SpO2: 96 % (05/23/17 1900) Vital Signs (24h Range):  Temp:  [97.9 °F (36.6 °C)-99.2 °F (37.3 °C)] 97.9 °F (36.6 °C)  Pulse:  [59-90] 66  Resp:  [12-42] 17  SpO2:  [91 %-98 %] 96 %  BP: (105-168)/() 128/82     Weight: 85.8 kg (189 lb 2.5 oz)  Body mass index is 27.93 kg/m².    Intake/Output Summary (Last 24 hours) at 05/23/17 2018  Last data filed at 05/23/17 1815   Gross per 24 hour   Intake          2096.85 ml   Output             2400 ml   Net          -303.15 ml      Physical Exam   Constitutional: He is oriented to person, place, and time. He appears well-developed and well-nourished. No distress.   HENT:   Head: Normocephalic.   Mouth/Throat: Oropharynx is clear and moist.   Eyes: EOM are normal. Pupils are equal, round, and reactive to light. No scleral icterus.   Cardiovascular: Normal rate, regular rhythm, normal heart sounds and intact distal pulses.  Exam reveals no friction rub.     No murmur heard.  Pulmonary/Chest: Effort normal and breath sounds normal. No respiratory distress. He has no wheezes.   Abdominal: Soft. Bowel sounds are normal. He exhibits no distension. There is no tenderness.   Musculoskeletal: Normal range of motion. He exhibits no edema.   Neurological: He is alert and oriented to person, place, and time. He has normal reflexes.   Skin: No rash noted. He is not diaphoretic. No erythema.   Psychiatric: He has a normal mood and affect. His behavior is normal.   Nursing note and vitals reviewed.      Significant Labs: All pertinent labs within the past 24 hours have been reviewed.    Significant Imaging: I have reviewed all pertinent imaging results/findings within the past 24 hours.    Assessment/Plan:      Coronary artery disease involving native coronary artery of native heart without angina pectoris    Chronic without evidence of angina.  Monitor on tele, continue routine medications; ASA/Effiant/statin/BB.  Monitor closely for s/sx angina. PRN NTG.          Chronic hepatitis C    S/p lMoni 1/17-- HCV RNA undetected since that time with normal liver function testing.  Ammonia normal, follow CMP.  F/U with Dr. Lamb upon discharge.          Recurrent major depressive disorder    Chronic problem. Will continue chronic medication(s), SSRI and monitor for any changes, adjusting as needed.             DDD (degenerative disc disease), lumbosacral    With chronic pain and long-standing opioid dependence.  Weaning as above.          Hypertensive retinopathy of both eyes    Chronic, continue home eye gtt medications.          * Opiate withdrawal    Noted opiate withdrawal, managed by precedex. COntinue symptom control, wean narcotics slowly and hopefully transition out of ICU in next 1-2 days.             VTE Risk Mitigation         Ordered     enoxaparin injection 40 mg  Daily     Route:  Subcutaneous        05/23/17 0314     Medium Risk of VTE  Once      05/23/17 0314           Ketan Melchor MD  Department of Hospital Medicine   [facility]

## 2017-05-24 NOTE — PLAN OF CARE
Problem: Patient Care Overview  Goal: Plan of Care Review  Outcome: Ongoing (interventions implemented as appropriate)  Care plan reviewed throughout the shift. Will continue with current care plan.   Goal: Individualization & Mutuality  Outcome: Ongoing (interventions implemented as appropriate)  Patient states feeling a little more confident that he will be able to detox off of the fentanyl.     Problem: Opioid Dependence/Withdrawal (Adult)  Goal: Identify Related Risk Factors and Signs and Symptoms  Related risk factors and signs and symptoms are identified upon initiation of Human Response Clinical Practice Guideline (CPG)   Outcome: Ongoing (interventions implemented as appropriate)  Patient remains on Precedex, but plan is to titrate patient off of the precedex by tonight.   Goal: Withdrawal Symptoms Managed or Absent  Patient will demonstrate the desired outcomes by discharge/transition of care.   Outcome: Ongoing (interventions implemented as appropriate)  Patient continues to have withdrawal symptoms,, diarrhea, occasionally anxious and restless but is able to control it. VVS.     Problem: Pressure Ulcer Risk (Marvin Scale) (Adult,Obstetrics,Pediatric)  Goal: Identify Related Risk Factors and Signs and Symptoms  Related risk factors and signs and symptoms are identified upon initiation of Human Response Clinical Practice Guideline (CPG)   Outcome: Ongoing (interventions implemented as appropriate)  Patient repositions self on specialty bed.

## 2017-05-24 NOTE — PLAN OF CARE
Problem: Patient Care Overview  Goal: Plan of Care Review  Outcome: Ongoing (interventions implemented as appropriate)  VSS, no distress noted. Pain/anxiety managed on current regiment. Patient did not have a restful night, despite multiple medication modalities.     Continue current plan of care at this time.    All safety measures in place. Will continue to monitor.

## 2017-05-24 NOTE — PLAN OF CARE
Problem: Patient Care Overview  Goal: Plan of Care Review  Outcome: Ongoing (interventions implemented as appropriate)  Care plan reviewed.

## 2017-05-24 NOTE — SUBJECTIVE & OBJECTIVE
Interval History: Patient remains symptomatic from withdrawals- + severe nausea, pain and malaise. Discussed with him at length that he will be uncomfortable and this is part of the withdrawal process.    Review of Systems   Constitutional: Positive for chills and fatigue. Negative for fever.   Respiratory: Negative for cough and shortness of breath.    Cardiovascular: Negative for chest pain and leg swelling.   Gastrointestinal: Positive for abdominal pain, nausea and vomiting.   Musculoskeletal: Negative for back pain.   Neurological: Negative for weakness.   Psychiatric/Behavioral: Positive for agitation and behavioral problems. Negative for confusion. The patient is nervous/anxious.      Objective:     Vital Signs (Most Recent):  Temp: 97.9 °F (36.6 °C) (05/23/17 1530)  Pulse: 66 (05/23/17 1929)  Resp: 17 (05/23/17 1900)  BP: 128/82 (05/23/17 1900)  SpO2: 96 % (05/23/17 1900) Vital Signs (24h Range):  Temp:  [97.9 °F (36.6 °C)-99.2 °F (37.3 °C)] 97.9 °F (36.6 °C)  Pulse:  [59-90] 66  Resp:  [12-42] 17  SpO2:  [91 %-98 %] 96 %  BP: (105-168)/() 128/82     Weight: 85.8 kg (189 lb 2.5 oz)  Body mass index is 27.93 kg/m².    Intake/Output Summary (Last 24 hours) at 05/23/17 2018  Last data filed at 05/23/17 1815   Gross per 24 hour   Intake          2096.85 ml   Output             2400 ml   Net          -303.15 ml      Physical Exam   Constitutional: He is oriented to person, place, and time. He appears well-developed and well-nourished. No distress.   HENT:   Head: Normocephalic.   Mouth/Throat: Oropharynx is clear and moist.   Eyes: EOM are normal. Pupils are equal, round, and reactive to light. No scleral icterus.   Cardiovascular: Normal rate, regular rhythm, normal heart sounds and intact distal pulses.  Exam reveals no friction rub.    No murmur heard.  Pulmonary/Chest: Effort normal and breath sounds normal. No respiratory distress. He has no wheezes.   Abdominal: Soft. Bowel sounds are normal. He  exhibits no distension. There is no tenderness.   Musculoskeletal: Normal range of motion. He exhibits no edema.   Neurological: He is alert and oriented to person, place, and time. He has normal reflexes.   Skin: No rash noted. He is not diaphoretic. No erythema.   Psychiatric: He has a normal mood and affect. His behavior is normal.   Nursing note and vitals reviewed.      Significant Labs: All pertinent labs within the past 24 hours have been reviewed.    Significant Imaging: I have reviewed all pertinent imaging results/findings within the past 24 hours.

## 2017-05-25 PROBLEM — I16.0 HYPERTENSIVE URGENCY: Status: ACTIVE | Noted: 2017-05-25

## 2017-05-25 LAB
ANION GAP SERPL CALC-SCNC: 10 MMOL/L
BASOPHILS # BLD AUTO: 0 K/UL
BASOPHILS NFR BLD: 0.2 %
BUN SERPL-MCNC: 15 MG/DL
CALCIUM SERPL-MCNC: 8.9 MG/DL
CHLORIDE SERPL-SCNC: 106 MMOL/L
CO2 SERPL-SCNC: 23 MMOL/L
CREAT SERPL-MCNC: 0.9 MG/DL
DIFFERENTIAL METHOD: ABNORMAL
EOSINOPHIL # BLD AUTO: 0.1 K/UL
EOSINOPHIL NFR BLD: 0.9 %
ERYTHROCYTE [DISTWIDTH] IN BLOOD BY AUTOMATED COUNT: 13.5 %
EST. GFR  (AFRICAN AMERICAN): >60 ML/MIN/1.73 M^2
EST. GFR  (NON AFRICAN AMERICAN): >60 ML/MIN/1.73 M^2
GLUCOSE SERPL-MCNC: 119 MG/DL
HCT VFR BLD AUTO: 35.1 %
HGB BLD-MCNC: 11.6 G/DL
LYMPHOCYTES # BLD AUTO: 2.9 K/UL
LYMPHOCYTES NFR BLD: 34.9 %
MAGNESIUM SERPL-MCNC: 1.7 MG/DL
MCH RBC QN AUTO: 28.6 PG
MCHC RBC AUTO-ENTMCNC: 33 %
MCV RBC AUTO: 87 FL
MONOCYTES # BLD AUTO: 0.7 K/UL
MONOCYTES NFR BLD: 8.3 %
NEUTROPHILS # BLD AUTO: 4.6 K/UL
NEUTROPHILS NFR BLD: 55.7 %
PHOSPHATE SERPL-MCNC: 1.6 MG/DL
PLATELET # BLD AUTO: 150 K/UL
PMV BLD AUTO: 7.5 FL
POTASSIUM SERPL-SCNC: 3.4 MMOL/L
RBC # BLD AUTO: 4.05 M/UL
SODIUM SERPL-SCNC: 139 MMOL/L
WBC # BLD AUTO: 8.3 K/UL

## 2017-05-25 PROCEDURE — 25000003 PHARM REV CODE 250: Performed by: HOSPITALIST

## 2017-05-25 PROCEDURE — 63600175 PHARM REV CODE 636 W HCPCS: Performed by: NURSE PRACTITIONER

## 2017-05-25 PROCEDURE — 83735 ASSAY OF MAGNESIUM: CPT

## 2017-05-25 PROCEDURE — 20000000 HC ICU ROOM

## 2017-05-25 PROCEDURE — 94761 N-INVAS EAR/PLS OXIMETRY MLT: CPT

## 2017-05-25 PROCEDURE — 85025 COMPLETE CBC W/AUTO DIFF WBC: CPT

## 2017-05-25 PROCEDURE — 63600175 PHARM REV CODE 636 W HCPCS: Performed by: HOSPITALIST

## 2017-05-25 PROCEDURE — 93005 ELECTROCARDIOGRAM TRACING: CPT

## 2017-05-25 PROCEDURE — 84100 ASSAY OF PHOSPHORUS: CPT

## 2017-05-25 PROCEDURE — 36415 COLL VENOUS BLD VENIPUNCTURE: CPT

## 2017-05-25 PROCEDURE — 80048 BASIC METABOLIC PNL TOTAL CA: CPT

## 2017-05-25 PROCEDURE — 25000003 PHARM REV CODE 250: Performed by: NURSE PRACTITIONER

## 2017-05-25 RX ORDER — LABETALOL 200 MG/1
200 TABLET, FILM COATED ORAL 2 TIMES DAILY
Status: DISCONTINUED | OUTPATIENT
Start: 2017-05-25 | End: 2017-05-26

## 2017-05-25 RX ORDER — CLONIDINE HYDROCHLORIDE 0.1 MG/1
0.1 TABLET ORAL 3 TIMES DAILY
Status: DISCONTINUED | OUTPATIENT
Start: 2017-05-25 | End: 2017-05-27 | Stop reason: HOSPADM

## 2017-05-25 RX ORDER — LABETALOL 200 MG/1
200 TABLET, FILM COATED ORAL 2 TIMES DAILY
Status: DISCONTINUED | OUTPATIENT
Start: 2017-05-25 | End: 2017-05-25

## 2017-05-25 RX ORDER — TIZANIDINE 2 MG/1
2 TABLET ORAL EVERY 8 HOURS PRN
Status: DISCONTINUED | OUTPATIENT
Start: 2017-05-25 | End: 2017-05-27 | Stop reason: HOSPADM

## 2017-05-25 RX ORDER — FENTANYL 25 UG/1
1 PATCH TRANSDERMAL
Status: DISCONTINUED | OUTPATIENT
Start: 2017-05-25 | End: 2017-05-26

## 2017-05-25 RX ADMIN — ENOXAPARIN SODIUM 40 MG: 100 INJECTION SUBCUTANEOUS at 05:05

## 2017-05-25 RX ADMIN — BRIMONIDINE TARTRATE 1 DROP: 1.5 SOLUTION OPHTHALMIC at 08:05

## 2017-05-25 RX ADMIN — DEXMEDETOMIDINE HYDROCHLORIDE 0.2 MCG/KG/HR: 4 INJECTION, SOLUTION INTRAVENOUS at 11:05

## 2017-05-25 RX ADMIN — PANTOPRAZOLE SODIUM 40 MG: 40 TABLET, DELAYED RELEASE ORAL at 08:05

## 2017-05-25 RX ADMIN — FLUOXETINE 40 MG: 20 CAPSULE ORAL at 08:05

## 2017-05-25 RX ADMIN — ATORVASTATIN CALCIUM 40 MG: 40 TABLET, FILM COATED ORAL at 08:05

## 2017-05-25 RX ADMIN — POTASSIUM CHLORIDE 40 MEQ: 20 SOLUTION ORAL at 07:05

## 2017-05-25 RX ADMIN — LABETALOL HCL 200 MG: 200 TABLET, FILM COATED ORAL at 07:05

## 2017-05-25 RX ADMIN — HALOPERIDOL 2 MG: 1 TABLET ORAL at 03:05

## 2017-05-25 RX ADMIN — FENTANYL 1 PATCH: 25 PATCH, EXTENDED RELEASE TRANSDERMAL at 01:05

## 2017-05-25 RX ADMIN — RAMELTEON 8 MG: 8 TABLET, FILM COATED ORAL at 08:05

## 2017-05-25 RX ADMIN — ONDANSETRON 8 MG: 2 INJECTION INTRAMUSCULAR; INTRAVENOUS at 05:05

## 2017-05-25 RX ADMIN — CLONIDINE HYDROCHLORIDE 0.1 MG: 0.1 TABLET ORAL at 05:05

## 2017-05-25 RX ADMIN — CLONIDINE HYDROCHLORIDE 0.1 MG: 0.1 TABLET ORAL at 09:05

## 2017-05-25 RX ADMIN — BRIMONIDINE TARTRATE 1 DROP: 1.5 SOLUTION OPHTHALMIC at 09:05

## 2017-05-25 RX ADMIN — HYDRALAZINE HYDROCHLORIDE 10 MG: 20 INJECTION INTRAMUSCULAR; INTRAVENOUS at 03:05

## 2017-05-25 RX ADMIN — LATANOPROST 1 DROP: 50 SOLUTION OPHTHALMIC at 08:05

## 2017-05-25 RX ADMIN — PRASUGREL HYDROCHLORIDE 10 MG: 10 TABLET, FILM COATED ORAL at 08:05

## 2017-05-25 RX ADMIN — ASPIRIN 81 MG: 81 TABLET, COATED ORAL at 08:05

## 2017-05-25 RX ADMIN — ACETAMINOPHEN 1000 MG: 500 TABLET, FILM COATED ORAL at 09:05

## 2017-05-25 RX ADMIN — VERAPAMIL HYDROCHLORIDE 240 MG: 240 TABLET, FILM COATED, EXTENDED RELEASE ORAL at 08:05

## 2017-05-25 RX ADMIN — LABETALOL HYDROCHLORIDE 100 MG: 100 TABLET, FILM COATED ORAL at 08:05

## 2017-05-25 RX ADMIN — QUETIAPINE FUMARATE 50 MG: 25 TABLET, FILM COATED ORAL at 08:05

## 2017-05-25 RX ADMIN — LOPERAMIDE HYDROCHLORIDE 2 MG: 2 CAPSULE ORAL at 01:05

## 2017-05-25 RX ADMIN — ACETAMINOPHEN 1000 MG: 500 TABLET, FILM COATED ORAL at 03:05

## 2017-05-25 RX ADMIN — ACETAMINOPHEN 1000 MG: 500 TABLET, FILM COATED ORAL at 06:05

## 2017-05-25 RX ADMIN — LISINOPRIL 5 MG: 2.5 TABLET ORAL at 08:05

## 2017-05-25 RX ADMIN — LOPERAMIDE HYDROCHLORIDE 2 MG: 2 CAPSULE ORAL at 02:05

## 2017-05-25 RX ADMIN — IBUPROFEN 800 MG: 400 TABLET ORAL at 04:05

## 2017-05-25 RX ADMIN — TIZANIDINE 2 MG: 2 TABLET ORAL at 08:05

## 2017-05-25 RX ADMIN — Medication 800 MG: at 07:05

## 2017-05-25 NOTE — SUBJECTIVE & OBJECTIVE
Interval History: Patient remains symptomatic from withdrawals- + severe nausea, pain and malaise. Discussed with him at length that he will be uncomfortable and this is part of the withdrawal process. Reduced narcotic and precedex today.    Review of Systems   Constitutional: Positive for chills and fatigue. Negative for fever.   Respiratory: Negative for cough and shortness of breath.    Cardiovascular: Negative for chest pain and leg swelling.   Gastrointestinal: Positive for abdominal pain, diarrhea and nausea.   Musculoskeletal: Negative for back pain.   Neurological: Negative for weakness.   Psychiatric/Behavioral: Positive for agitation and behavioral problems. Negative for confusion. The patient is nervous/anxious.      Objective:     Vital Signs (Most Recent):  Temp: 98.1 °F (36.7 °C) (05/24/17 1600)  Pulse: 68 (05/24/17 1800)  Resp: 11 (05/24/17 1800)  BP: (!) 154/88 (05/24/17 1800)  SpO2: 95 % (05/24/17 1800) Vital Signs (24h Range):  Temp:  [97.2 °F (36.2 °C)-98.9 °F (37.2 °C)] 98.1 °F (36.7 °C)  Pulse:  [56-78] 68  Resp:  [11-45] 11  SpO2:  [90 %-97 %] 95 %  BP: (101-160)/(59-93) 154/88     Weight: 85.8 kg (189 lb 2.5 oz)  Body mass index is 27.93 kg/m².    Intake/Output Summary (Last 24 hours) at 05/24/17 2108  Last data filed at 05/24/17 1800   Gross per 24 hour   Intake          2941.16 ml   Output             1075 ml   Net          1866.16 ml      Physical Exam   Constitutional: He is oriented to person, place, and time. He appears well-developed and well-nourished. No distress.   HENT:   Head: Normocephalic.   Mouth/Throat: Oropharynx is clear and moist.   Eyes: EOM are normal. Pupils are equal, round, and reactive to light. No scleral icterus.   Cardiovascular: Normal rate, regular rhythm, normal heart sounds and intact distal pulses.  Exam reveals no friction rub.    No murmur heard.  Pulmonary/Chest: Effort normal and breath sounds normal. No respiratory distress. He has no wheezes.   Abdominal:  Soft. Bowel sounds are normal. He exhibits no distension. There is no tenderness.   Musculoskeletal: Normal range of motion. He exhibits no edema.   Neurological: He is alert and oriented to person, place, and time. He has normal reflexes.   Skin: No rash noted. He is not diaphoretic. No erythema.   Psychiatric: He has a normal mood and affect. His behavior is normal.   Nursing note and vitals reviewed.      Significant Labs: All pertinent labs within the past 24 hours have been reviewed.    Significant Imaging: I have reviewed all pertinent imaging results/findings within the past 24 hours.

## 2017-05-25 NOTE — PROGRESS NOTES
"[facility] Medicine  Progress Note    Patient Name: Benny Murray  MRN: [id number]  Patient Class: IP- Inpatient   Admission Date: 5/22/2017  Length of Stay: 2 days  Attending Physician: Ketan Melchor MD  Primary Care Provider: Valentino Dickson MD        Subjective:     Principal Problem:Opiate withdrawal    HPI:  Benny Murray is a 66 y.o. male with HTN, CAD s/p stent placement, and chronic left sciatica.  He was admitted to the service of hospital medicine with concern for serotonin syndrome.  He presented to the ED with self- explained worsening withdrawal symptoms including tremor, diarrhea, agitation, and extreme restlessness. The symptoms Monday morning. He reports transitioning off Fentanyl 150 mg patches for the last 6 years (removed patch Sunday) with the assistance of Dr. Dickson.  He began experiencing his symptoms on Monday AM, he reported to Dr. Dickson's office and was given Suboxone 2 mg in his office.  At this time his symptoms worsened and reaction is well-documented by Dr. Dickson who felt the patient did not wean off his narcotics as directed. His symptoms have worsened since that time.  In the ED he was found to be hypertensive with anxiety, restlessness, and tremor. He was given IV fentanyl, oral oxycodone without improvement in symptoms. He was given 1 mg IV ativan without result.  Given the patient's use of prozac + suboxone, ED physician was concerned for serotonin syndrome vs. Opiate withdrawal.  The patient reports cont'd symptoms upon examination stating "I can't take this."  He states he has never weaned off of narcotics before and has been on them for 11 years.  He states he wanted to wean because his orthopedic/pain clinic would no longer be operating as a pain clinic and were not helpful in assisting him to find pain management.  He denies any chest pain, SOB, nausea, vomiting, fever, or chills.  He does endorse diarrhea, restless legs, tremor, sweating, and extreme " anxiety.  He denies any illicit drug use, no change in medication other than that reported. Other pertinent medical history as below:    Hospital Course:  No notes on file    Interval History: Patient remains symptomatic from withdrawals- + severe nausea, pain and malaise. Discussed with him at length that he will be uncomfortable and this is part of the withdrawal process. Reduced narcotic and precedex today.    Review of Systems   Constitutional: Positive for chills and fatigue. Negative for fever.   Respiratory: Negative for cough and shortness of breath.    Cardiovascular: Negative for chest pain and leg swelling.   Gastrointestinal: Positive for abdominal pain, diarrhea and nausea.   Musculoskeletal: Negative for back pain.   Neurological: Negative for weakness.   Psychiatric/Behavioral: Positive for agitation and behavioral problems. Negative for confusion. The patient is nervous/anxious.      Objective:     Vital Signs (Most Recent):  Temp: 98.1 °F (36.7 °C) (05/24/17 1600)  Pulse: 68 (05/24/17 1800)  Resp: 11 (05/24/17 1800)  BP: (!) 154/88 (05/24/17 1800)  SpO2: 95 % (05/24/17 1800) Vital Signs (24h Range):  Temp:  [97.2 °F (36.2 °C)-98.9 °F (37.2 °C)] 98.1 °F (36.7 °C)  Pulse:  [56-78] 68  Resp:  [11-45] 11  SpO2:  [90 %-97 %] 95 %  BP: (101-160)/(59-93) 154/88     Weight: 85.8 kg (189 lb 2.5 oz)  Body mass index is 27.93 kg/m².    Intake/Output Summary (Last 24 hours) at 05/24/17 2108  Last data filed at 05/24/17 1800   Gross per 24 hour   Intake          2941.16 ml   Output             1075 ml   Net          1866.16 ml      Physical Exam   Constitutional: He is oriented to person, place, and time. He appears well-developed and well-nourished. No distress.   HENT:   Head: Normocephalic.   Mouth/Throat: Oropharynx is clear and moist.   Eyes: EOM are normal. Pupils are equal, round, and reactive to light. No scleral icterus.   Cardiovascular: Normal rate, regular rhythm, normal heart sounds and intact  distal pulses.  Exam reveals no friction rub.    No murmur heard.  Pulmonary/Chest: Effort normal and breath sounds normal. No respiratory distress. He has no wheezes.   Abdominal: Soft. Bowel sounds are normal. He exhibits no distension. There is no tenderness.   Musculoskeletal: Normal range of motion. He exhibits no edema.   Neurological: He is alert and oriented to person, place, and time. He has normal reflexes.   Skin: No rash noted. He is not diaphoretic. No erythema.   Psychiatric: He has a normal mood and affect. His behavior is normal.   Nursing note and vitals reviewed.      Significant Labs: All pertinent labs within the past 24 hours have been reviewed.    Significant Imaging: I have reviewed all pertinent imaging results/findings within the past 24 hours.    Assessment/Plan:      Coronary artery disease involving native coronary artery of native heart without angina pectoris    Chronic without evidence of angina.  Monitor on tele, continue routine medications; ASA/Effiant/statin/BB.  Monitor closely for s/sx angina. PRN NTG.          Chronic hepatitis C    S/p Harvoni 1/17-- HCV RNA undetected since that time with normal liver function testing.  Ammonia normal, follow CMP.  F/U with Dr. Lamb upon discharge.          Recurrent major depressive disorder    Chronic problem. Will continue chronic medication(s), SSRI and monitor for any changes, adjusting as needed.             DDD (degenerative disc disease), lumbosacral    With chronic pain and long-standing opioid dependence.  Weaning as above.          Hypertensive retinopathy of both eyes    Chronic, continue home eye gtt medications.          * Opiate withdrawal    Noted opiate withdrawal, managed by precedex. Continue symptom control, wean narcotics slowly and hopefully transition out of ICU in next 1-2 days. Taken to 50mcg of fentanyl today.            VTE Risk Mitigation         Ordered     enoxaparin injection 40 mg  Daily     Route:   Subcutaneous        05/23/17 0314     Medium Risk of VTE  Once      05/23/17 0314          Ketan Melchor MD  Department of Hospital Medicine   [facility]

## 2017-05-25 NOTE — PLAN OF CARE
05/25/17 0853   Patient Assessment/Suction   Level of Consciousness (AVPU) alert   Respiratory Effort Normal;Unlabored   Expansion/Accessory Muscles/Retractions no retractions;no use of accessory muscles   All Lung Fields Breath Sounds clear   PRE-TX-O2-ETCO2   O2 Device (Oxygen Therapy) room air   SpO2 97 %   Pulse Oximetry Type Continuous   $ Pulse Oximetry - Multiple Charge Pulse Oximetry - Multiple   Pulse 64   Resp 14

## 2017-05-25 NOTE — PLAN OF CARE
Problem: Patient Care Overview  Goal: Plan of Care Review  Outcome: Ongoing (interventions implemented as appropriate)  Patient with uneventful night, slept off and on throughout shift. Discussed with patient plan of care. VS stable. IV Precedex infusing at .45 mcg/hour. Rate not changed until this am due to patient complaining of feeling anxious, hot inside and blood pressure running high. Spoke with Charge Nurse Brianne, instructed to change rate in am. Up to bedside commode x 1 for diarrhea. Tylenol given for headache earlier in shift with relief. States headache came back, given Ibuprofen with no relief followed by Tylenol.

## 2017-05-25 NOTE — PROGRESS NOTES
Message sent to Dr. Melchor regarding lab results of K+, Mag, and Phos. Also updated on blood pressure.

## 2017-05-25 NOTE — PROGRESS NOTES
Patient with increased complaints of feeling very weak, and states he started feeling weaker a few hours ago along with shortness of breath. EKG was done per order, and Dr. Melchor ordered for patient to have blood work done to check electrolytes due to patient has had many episodes of diarrhea, poor appetite, is drinking fluids. States he is voiding but it has been going into the BSC mixing with the stool. Patient is also being started on clonidine to decrease rising blood pressure.

## 2017-05-25 NOTE — ASSESSMENT & PLAN NOTE
Noted opiate withdrawal, managed by precedex. Continue symptom control, wean narcotics slowly and hopefully transition out of ICU in next 1-2 days. Taken to 50mcg of fentanyl today.

## 2017-05-25 NOTE — PLAN OF CARE
05/24/17 2030   Patient Assessment/Suction   Level of Consciousness (AVPU) alert   Respiratory Effort Normal;Unlabored   Expansion/Accessory Muscles/Retractions no retractions;no use of accessory muscles   PRE-TX-O2-ETCO2   O2 Device (Oxygen Therapy) room air   SpO2 95 %   Pulse Oximetry Type Continuous   Pulse 71   Resp 15   Temp 98.3 °F (36.8 °C)   BP (!) 150/92

## 2017-05-25 NOTE — PROGRESS NOTES
Message sent to Dr. Melchor about patient blood pressure still over 200's/100's. Waiting for response.

## 2017-05-25 NOTE — PT/OT/SLP PROGRESS
"Physical Therapy      Benny Murray  MRN: [id number]    Patient not seen today secondary to pt states he is "still feeling rough"  . Will follow-up 5/26/17.    Alba Glasgow, PT    "

## 2017-05-26 PROBLEM — G25.79 SEROTONIN SYNDROME: Status: ACTIVE | Noted: 2017-05-26

## 2017-05-26 LAB
BASOPHILS # BLD AUTO: 0 K/UL
BASOPHILS NFR BLD: 0.3 %
DIFFERENTIAL METHOD: ABNORMAL
EOSINOPHIL # BLD AUTO: 0 K/UL
EOSINOPHIL NFR BLD: 0.1 %
ERYTHROCYTE [DISTWIDTH] IN BLOOD BY AUTOMATED COUNT: 13.7 %
HCT VFR BLD AUTO: 38 %
HGB BLD-MCNC: 12.6 G/DL
LYMPHOCYTES # BLD AUTO: 3.1 K/UL
LYMPHOCYTES NFR BLD: 33.6 %
MCH RBC QN AUTO: 28.8 PG
MCHC RBC AUTO-ENTMCNC: 33.2 %
MCV RBC AUTO: 87 FL
MONOCYTES # BLD AUTO: 0.8 K/UL
MONOCYTES NFR BLD: 8.2 %
NEUTROPHILS # BLD AUTO: 5.4 K/UL
NEUTROPHILS NFR BLD: 57.8 %
PLATELET # BLD AUTO: 162 K/UL
PMV BLD AUTO: 7.5 FL
RBC # BLD AUTO: 4.38 M/UL
WBC # BLD AUTO: 9.3 K/UL

## 2017-05-26 PROCEDURE — 25000003 PHARM REV CODE 250: Performed by: HOSPITALIST

## 2017-05-26 PROCEDURE — 25000003 PHARM REV CODE 250: Performed by: NURSE PRACTITIONER

## 2017-05-26 PROCEDURE — 94761 N-INVAS EAR/PLS OXIMETRY MLT: CPT

## 2017-05-26 PROCEDURE — 85025 COMPLETE CBC W/AUTO DIFF WBC: CPT

## 2017-05-26 PROCEDURE — 63600175 PHARM REV CODE 636 W HCPCS: Performed by: NURSE PRACTITIONER

## 2017-05-26 PROCEDURE — 97161 PT EVAL LOW COMPLEX 20 MIN: CPT

## 2017-05-26 PROCEDURE — 97166 OT EVAL MOD COMPLEX 45 MIN: CPT

## 2017-05-26 PROCEDURE — 12000002 HC ACUTE/MED SURGE SEMI-PRIVATE ROOM

## 2017-05-26 PROCEDURE — 36415 COLL VENOUS BLD VENIPUNCTURE: CPT

## 2017-05-26 RX ORDER — LABETALOL 100 MG/1
100 TABLET, FILM COATED ORAL 2 TIMES DAILY
Status: DISCONTINUED | OUTPATIENT
Start: 2017-05-26 | End: 2017-05-27 | Stop reason: HOSPADM

## 2017-05-26 RX ADMIN — ASPIRIN 81 MG: 81 TABLET, COATED ORAL at 08:05

## 2017-05-26 RX ADMIN — VERAPAMIL HYDROCHLORIDE 240 MG: 240 TABLET, FILM COATED, EXTENDED RELEASE ORAL at 08:05

## 2017-05-26 RX ADMIN — QUETIAPINE FUMARATE 50 MG: 25 TABLET, FILM COATED ORAL at 08:05

## 2017-05-26 RX ADMIN — PRASUGREL HYDROCHLORIDE 10 MG: 10 TABLET, FILM COATED ORAL at 08:05

## 2017-05-26 RX ADMIN — BRIMONIDINE TARTRATE 1 DROP: 1.5 SOLUTION OPHTHALMIC at 08:05

## 2017-05-26 RX ADMIN — LISINOPRIL 5 MG: 2.5 TABLET ORAL at 08:05

## 2017-05-26 RX ADMIN — CLONIDINE HYDROCHLORIDE 0.1 MG: 0.1 TABLET ORAL at 08:05

## 2017-05-26 RX ADMIN — TIZANIDINE 2 MG: 2 TABLET ORAL at 04:05

## 2017-05-26 RX ADMIN — LABETALOL HCL 100 MG: 100 TABLET, FILM COATED ORAL at 08:05

## 2017-05-26 RX ADMIN — TIZANIDINE 2 MG: 2 TABLET ORAL at 08:05

## 2017-05-26 RX ADMIN — ENOXAPARIN SODIUM 40 MG: 100 INJECTION SUBCUTANEOUS at 04:05

## 2017-05-26 RX ADMIN — PANTOPRAZOLE SODIUM 40 MG: 40 TABLET, DELAYED RELEASE ORAL at 08:05

## 2017-05-26 RX ADMIN — RAMELTEON 8 MG: 8 TABLET, FILM COATED ORAL at 08:05

## 2017-05-26 RX ADMIN — FLUOXETINE 40 MG: 20 CAPSULE ORAL at 08:05

## 2017-05-26 RX ADMIN — CLONIDINE HYDROCHLORIDE 0.1 MG: 0.1 TABLET ORAL at 01:05

## 2017-05-26 RX ADMIN — LABETALOL HCL 200 MG: 200 TABLET, FILM COATED ORAL at 08:05

## 2017-05-26 RX ADMIN — ATORVASTATIN CALCIUM 40 MG: 40 TABLET, FILM COATED ORAL at 08:05

## 2017-05-26 RX ADMIN — LATANOPROST 1 DROP: 50 SOLUTION OPHTHALMIC at 08:05

## 2017-05-26 NOTE — PROGRESS NOTES
"[facility] Medicine  Progress Note    Patient Name: Benny Murray  MRN: [id number]  Patient Class: IP- Inpatient   Admission Date: 5/22/2017  Length of Stay: 3 days  Attending Physician: Ketan Melchor MD  Primary Care Provider: Valentino Dickson MD        Subjective:     Principal Problem:Opiate withdrawal    HPI:  Benny Murray is a 66 y.o. male with HTN, CAD s/p stent placement, and chronic left sciatica.  He was admitted to the service of hospital medicine with concern for serotonin syndrome.  He presented to the ED with self- explained worsening withdrawal symptoms including tremor, diarrhea, agitation, and extreme restlessness. The symptoms Monday morning. He reports transitioning off Fentanyl 150 mg patches for the last 6 years (removed patch Sunday) with the assistance of Dr. Dickson.  He began experiencing his symptoms on Monday AM, he reported to Dr. Dickson's office and was given Suboxone 2 mg in his office.  At this time his symptoms worsened and reaction is well-documented by Dr. Dickson who felt the patient did not wean off his narcotics as directed. His symptoms have worsened since that time.  In the ED he was found to be hypertensive with anxiety, restlessness, and tremor. He was given IV fentanyl, oral oxycodone without improvement in symptoms. He was given 1 mg IV ativan without result.  Given the patient's use of prozac + suboxone, ED physician was concerned for serotonin syndrome vs. Opiate withdrawal.  The patient reports cont'd symptoms upon examination stating "I can't take this."  He states he has never weaned off of narcotics before and has been on them for 11 years.  He states he wanted to wean because his orthopedic/pain clinic would no longer be operating as a pain clinic and were not helpful in assisting him to find pain management.  He denies any chest pain, SOB, nausea, vomiting, fever, or chills.  He does endorse diarrhea, restless legs, tremor, sweating, and extreme " anxiety.  He denies any illicit drug use, no change in medication other than that reported. Other pertinent medical history as below:    Hospital Course:  No notes on file    Interval History: Patient remains symptomatic from withdrawals- Anxious, with diarrhea. BP elevated but weaning fentanyl and precedex. Reports back pain worsening.    Review of Systems   Constitutional: Positive for chills and fatigue. Negative for fever.   Respiratory: Negative for cough and shortness of breath.    Cardiovascular: Negative for chest pain and leg swelling.   Gastrointestinal: Positive for abdominal pain, diarrhea and nausea.   Musculoskeletal: Negative for back pain.   Neurological: Negative for weakness.   Psychiatric/Behavioral: Positive for agitation and behavioral problems. Negative for confusion. The patient is nervous/anxious.      Objective:     Vital Signs (Most Recent):  Temp: 98.5 °F (36.9 °C) (05/25/17 1730)  Pulse: 80 (05/25/17 1900)  Resp: (!) 24 (05/25/17 1900)  BP: (!) 200/107 (05/25/17 1921)  SpO2: 97 % (05/25/17 1900) Vital Signs (24h Range):  Temp:  [98.2 °F (36.8 °C)-98.5 °F (36.9 °C)] 98.5 °F (36.9 °C)  Pulse:  [61-80] 80  Resp:  [11-50] 24  SpO2:  [94 %-99 %] 97 %  BP: (128-203)/() 200/107     Weight: 85.8 kg (189 lb 2.5 oz)  Body mass index is 27.93 kg/m².    Intake/Output Summary (Last 24 hours) at 05/25/17 1943  Last data filed at 05/25/17 1900   Gross per 24 hour   Intake          1348.87 ml   Output              654 ml   Net           694.87 ml      Physical Exam   Constitutional: He is oriented to person, place, and time. He appears well-developed and well-nourished. No distress.   HENT:   Head: Normocephalic.   Mouth/Throat: Oropharynx is clear and moist.   Eyes: EOM are normal. Pupils are equal, round, and reactive to light. No scleral icterus.   Cardiovascular: Normal rate, regular rhythm, normal heart sounds and intact distal pulses.  Exam reveals no friction rub.    No murmur  heard.  Pulmonary/Chest: Effort normal and breath sounds normal. No respiratory distress. He has no wheezes.   Abdominal: Soft. Bowel sounds are normal. He exhibits no distension. There is no tenderness.   Musculoskeletal: Normal range of motion. He exhibits no edema.   Neurological: He is alert and oriented to person, place, and time. He has normal reflexes.   Skin: No rash noted. He is not diaphoretic. No erythema.   Psychiatric: He has a normal mood and affect. His behavior is normal.   Nursing note and vitals reviewed.      Significant Labs: All pertinent labs within the past 24 hours have been reviewed.    Significant Imaging: I have reviewed all pertinent imaging results/findings within the past 24 hours.    Assessment/Plan:      Hypertensive urgency    Patient with noted chest pain. Will start clonidine, PRN labetolol and monitor closely. May be rebound from precedex.          Coronary artery disease involving native coronary artery of native heart without angina pectoris    Chronic without evidence of angina.  Monitor on tele, continue routine medications; ASA/Effiant/statin/BB.  Monitor closely for s/sx angina. PRN NTG.          Chronic hepatitis C    S/p Harvoni 1/17-- HCV RNA undetected since that time with normal liver function testing.  Ammonia normal, follow CMP.  F/U with Dr. Lamb upon discharge.          Recurrent major depressive disorder    Chronic problem. Will continue chronic medication(s), SSRI and monitor for any changes, adjusting as needed.             DDD (degenerative disc disease), lumbosacral    With chronic pain and long-standing opioid dependence.  Weaning as above.          Hypertensive retinopathy of both eyes    Chronic, continue home eye gtt medications.          * Opiate withdrawal    Noted opiate withdrawal, managed by precedex. Continue symptom control, wean narcotics slowly and hopefully transition out of ICU in next 1-2 days. Taken to 25mcg of fentanyl today.            VTE  Risk Mitigation         Ordered     enoxaparin injection 40 mg  Daily     Route:  Subcutaneous        05/23/17 0314     Medium Risk of VTE  Once      05/23/17 0314          Ketan Melchor MD  Department of Hospital Medicine   [facility]

## 2017-05-26 NOTE — PT/OT/SLP EVAL
Occupational Therapy  Evaluation and Discharge    Benny Murray   MRN: [id number]   Admitting Diagnosis: Opiate withdrawal    OT Date of Treatment: 05/26/17   OT Start Time: 1315  OT Stop Time: 1408  OT Total Time (min): 53 min    Billable Minutes:  Evaluation 10  Self Care/Home Management 43    Diagnosis: Opiate withdrawal       Past Medical History:   Diagnosis Date    Anemia     Anxiety     Arthritis     Blood transfusion     x4    Cancer BASAL CELL CHEST - NEW DX    Cataract     ou done    Clotting disorder     Coronary artery disease     Depression     Heart abnormalities     Hypertension     Myocardial infarction 10 YRS AGO    silent    Osteoporosis     Wears glasses       Past Surgical History:   Procedure Laterality Date    APPENDECTOMY      Avastin os #2  8-    BACK SURGERY  2003    CATARACT EXTRACTION      os 12-13-12 od d 5-15-13//    EYE SURGERY  LEFT CATARACT 1/13    SPINE SURGERY      lumbar spine       Referring physician: Ruiz  Date referred to OT: 5-25-17    General Precautions: Standard, fall  Orthopedic Precautions: N/A  Braces: N/A    Do you have any cultural, spiritual, Taoism conflicts, given your current situation?: none     Patient History:  Living Environment  Lives With: alone  Living Arrangements: mobile home  Home Accessibility: stairs to enter home  Number of Stairs to Enter Home: 3  Stair Railings at Home: outside, present on left side  Transportation Available: taxi  Living Environment Comment:  (pt lives alone, independent. Has family but no daily assist.)  Equipment Currently Used at Home: cane, straight    Prior level of function:   Bed Mobility/Transfers: needs device (uses SC at times)  Grooming: independent  Bathing: independent  Upper Body Dressing: independent  Lower Body Dressing: independent  Toileting: independent  Home Management Skills: independent  Homemaking Responsibilities: Yes  Meal Prep Responsibility: Primary  Laundry Responsibility:  Primary  Cleaning Responsibility: Primary  Bill Paying/Finance Responsibility: Primary  Shopping Responsibility: Primary  Driving License: No  Mode of Transportation: Cab  Occupation: On disability     Dominant hand: right    Subjective:  Communicated with nurse prior to session.    Chief Complaint: I think I am doing ok, I know I feel better.  Patient/Family stated goals: to return home    Pain/Comfort  Pain Rating 1: 0/10  Pain Rating Post-Intervention 1: 0/10  Pain Rating 2: 0/10  Pain Rating Post-Intervention 2: 0/10    Objective:  Patient found with: telemetry, pulse ox (continuous), blood pressure cuff, peripheral IV    Cognitive Exam:  Oriented to: Person, Place, Time and Situation  Follows Commands/attention: Follows multistep  commands  Communication: clear/fluent  Memory:  No Deficits noted  Safety awareness/insight to disability: intact  Coping skills/emotional control: Appropriate to situation    Visual/perceptual:  Intact    Physical Exam:  Postural examination/scapula alignment: No postural abnormalities identified  Skin integrity: Visible skin intact  Edema: None noted     Sensation:   Intact    Upper Extremity Range of Motion:  Right Upper Extremity: WNL  Left Upper Extremity: WNL    Upper Extremity Strength:  Right Upper Extremity: WNL  Left Upper Extremity: WNL   Strength: WNL    Fine motor coordination:   Intact    Gross motor coordination: WFL    Functional Mobility:  Bed Mobility:  Rolling/Turning to Left: Supervision  Rolling/Turning Right: Supervision  Scooting/Bridging: Supervision  Supine to Sit: Supervision  Sit to Supine: Supervision    Transfers:  Sit <> Stand Assistance: Supervision  Sit <> Stand Assistive Device: No Assistive Device    Functional Ambulation: > 50 feet with SC in room    Activities of Daily Living:  Feeding Level of Assistance: Modified independent    UE Dressing Level of Assistance: Set-up Assistance    LE Dressing Level of Assistance: Set-up Assistance    Grooming  "Position: Standing at sink  Grooming Level of Assistance: Supervision      Balance:   Static Sit: NORMAL: No deviations seen in posture held statically  Dynamic Sit: NORMAL: No deviations seen in posture held dynamically  Static Stand: GOOD: Takes MODERATE challenges from all directions  Dynamic stand: GOOD: Needs SUPERVISION only during gait and able to self right with moderate  ( while in acute setting)    Therapeutic Activities and Exercises:  Session included evaluation. Session also included in room ambulation, to sink for grooming, reviewed and educated on energy conservation and AE that will help control, manage pain. Pt left in bed in full chair position.    AM-PAC 6 CLICK ADL  How much help from another person does this patient currently need?  1 = Unable, Total/Dependent Assistance  2 = A lot, Maximum/Moderate Assistance  3 = A little, Minimum/Contact Guard/Supervision  4 = None, Modified [place]/Independent    Putting on and taking off regular lower body clothing? : 4  Bathing (including washing, rinsing, drying)?: 4  Toileting, which includes using toilet, bedpan, or urinal? : 4  Putting on and taking off regular upper body clothing?: 4  Taking care of personal grooming such as brushing teeth?: 4  Eating meals?: 4  Total Score: 24    AM-PAC Raw Score CMS "G-Code Modifier Level of Impairment Assistance   6 % Total / Unable   7 - 9 CM 80 - 100% Maximal Assist   10-14 CL 60 - 80% Moderate Assist   15 - 19 CK 40 - 60% Moderate Assist   20 - 22 CJ 20 - 40% Minimal Assist   23 CI 1-20% SBA / CGA   24 CH 0% Independent/ Mod I       Patient left with bed in chair position with all lines intact, call button in reach and nurse notified    Assessment:  Benny Murray is a 66 y.o. male with a medical diagnosis of Opiate withdrawal .    Rehab identified problem list/impairments: Rehab identified problem list/impairments: pain (no need for OT)      Activity tolerance: Excellent    Discharge " recommendations: Discharge Facility/Level Of Care Needs: home     Barriers to discharge: Barriers to Discharge: None    Equipment recommendations:  (advised pt to but removable grab bar for side of tub and also seat for tub.)       PLAN: Discharge OT  Plan of Care reviewed with: patient         EVARISTO Lin  05/26/2017

## 2017-05-26 NOTE — SUBJECTIVE & OBJECTIVE
Interval History: Patient remains symptomatic from withdrawals- Anxious, with diarrhea. BP elevated but weaning fentanyl and precedex. Reports back pain worsening.    Review of Systems   Constitutional: Positive for chills and fatigue. Negative for fever.   Respiratory: Negative for cough and shortness of breath.    Cardiovascular: Negative for chest pain and leg swelling.   Gastrointestinal: Positive for abdominal pain, diarrhea and nausea.   Musculoskeletal: Negative for back pain.   Neurological: Negative for weakness.   Psychiatric/Behavioral: Positive for agitation and behavioral problems. Negative for confusion. The patient is nervous/anxious.      Objective:     Vital Signs (Most Recent):  Temp: 98.5 °F (36.9 °C) (05/25/17 1730)  Pulse: 80 (05/25/17 1900)  Resp: (!) 24 (05/25/17 1900)  BP: (!) 200/107 (05/25/17 1921)  SpO2: 97 % (05/25/17 1900) Vital Signs (24h Range):  Temp:  [98.2 °F (36.8 °C)-98.5 °F (36.9 °C)] 98.5 °F (36.9 °C)  Pulse:  [61-80] 80  Resp:  [11-50] 24  SpO2:  [94 %-99 %] 97 %  BP: (128-203)/() 200/107     Weight: 85.8 kg (189 lb 2.5 oz)  Body mass index is 27.93 kg/m².    Intake/Output Summary (Last 24 hours) at 05/25/17 1943  Last data filed at 05/25/17 1900   Gross per 24 hour   Intake          1348.87 ml   Output              654 ml   Net           694.87 ml      Physical Exam   Constitutional: He is oriented to person, place, and time. He appears well-developed and well-nourished. No distress.   HENT:   Head: Normocephalic.   Mouth/Throat: Oropharynx is clear and moist.   Eyes: EOM are normal. Pupils are equal, round, and reactive to light. No scleral icterus.   Cardiovascular: Normal rate, regular rhythm, normal heart sounds and intact distal pulses.  Exam reveals no friction rub.    No murmur heard.  Pulmonary/Chest: Effort normal and breath sounds normal. No respiratory distress. He has no wheezes.   Abdominal: Soft. Bowel sounds are normal. He exhibits no distension. There is  no tenderness.   Musculoskeletal: Normal range of motion. He exhibits no edema.   Neurological: He is alert and oriented to person, place, and time. He has normal reflexes.   Skin: No rash noted. He is not diaphoretic. No erythema.   Psychiatric: He has a normal mood and affect. His behavior is normal.   Nursing note and vitals reviewed.      Significant Labs: All pertinent labs within the past 24 hours have been reviewed.    Significant Imaging: I have reviewed all pertinent imaging results/findings within the past 24 hours.

## 2017-05-26 NOTE — PT/OT/SLP EVAL
Physical Therapy  Evaluation    Benny Murray    MRN: [id number]   Admitting Diagnosis: Opiate withdrawal    PT Received On: 05/26/17  PT Start Time: 1500     PT Stop Time: 1508    PT Total Time (min): 8 min       Billable Minutes:  Evaluation 8 minutes     Diagnosis: Opiate withdrawal      Past Medical History:   Diagnosis Date    Anemia     Anxiety     Arthritis     Blood transfusion     x4    Cancer BASAL CELL CHEST - NEW DX    Cataract     ou done    Clotting disorder     Coronary artery disease     Depression     Heart abnormalities     Hypertension     Myocardial infarction 10 YRS AGO    silent    Osteoporosis     Wears glasses       Past Surgical History:   Procedure Laterality Date    APPENDECTOMY      Avastin os #2  8-    BACK SURGERY  2003    CATARACT EXTRACTION      os 12-13-12 od d 5-15-13//    EYE SURGERY  LEFT CATARACT 1/13    SPINE SURGERY      lumbar spine       Referring physician: Ruiz  Date referred to PT: 5/26/17    General Precautions: Standard, fall  Orthopedic Precautions: N/A   Braces: N/A            Patient History:  Lives With: alone  Living Arrangements: mobile home  Home Accessibility: stairs to enter home  Number of Stairs to Enter Home: 3  Stair Railings at Home: other (see comments) (outside, side not specified)  Equipment Currently Used at Home: cane, straight  DME owned (not currently used): single point cane    Previous Level of Function:  Ambulation Skills: needs device (single point cane)  Transfer Skills: needs device (single point cane)     Subjective:  Communicated with patient/nurse prior to session.    Chief Complaint: back pain    Pain/Comfort  Pain Rating 1: 5/10  Location - Orientation 1: lower  Location 1: back  Pain Addressed 2: Reposition, Cessation of Activity      Objective:   Patient found with: blood pressure cuff, pulse ox (continuous), peripheral IV    Follows Commands/attention: Follows two-step commands  Communication:  clear/fluent  Safety awareness/insight to disability: intact    Physical Exam:    Skin integrity: Visible skin intact    Upper Extremity Range of Motion: see OT eval    Upper Extremity Strength: see OT eval    Lower Extremity Range of Motion:  Right Lower Extremity: WFL  Left Lower Extremity: WFL    Lower Extremity Strength:  Right Lower Extremity: >3/5   Left Lower Extremity: >3/5      Functional Mobility:  Bed Mobility:  Supine to Sit: Independent  Sit to Supine: Independent    Transfers:  Sit <> Stand Assistance: Modified Independent  Sit <> Stand Assistive Device: Straight Cane    Gait:   Gait Distance: 300  Assistance 1: Contact Guard Assistance  Gait Assistive Device: Single point cane    Balance:   Static Sit: FAIR: Maintains without assist, but unable to take any challenges   Dynamic Sit: GOOD-: Maintains balance through MODERATE excursions of active trunk movement,     Static Stand: FAIR: Maintains without assist but unable to take challenges  Dynamic stand: FAIR: Needs CONTACT GUARD during gait    Therapeutic Activities and Exercises:  Supine: SLR x 10     AM-PAC 6 CLICK MOBILITY  How much help from another person does this patient currently need?   1 = Unable, Total/Dependent Assistance  2 = A lot, Maximum/Moderate Assistance  3 = A little, Minimum/Contact Guard/Supervision  4 = None, Modified [place]/Independent    Turning over in bed (including adjusting bedclothes, sheets and blankets)?: 4  Sitting down on and standing up from a chair with arms (e.g., wheelchair, bedside commode, etc.): 4  Moving from lying on back to sitting on the side of the bed?: 4  Moving to and from a bed to a chair (including a wheelchair)?: 3  Need to walk in hospital room?: 3  Climbing 3-5 steps with a railing?: 3  Total Score: 21     AM-PAC Raw Score CMS G-Code Modifier Level of Impairment Assistance   6 % Total / Unable   7 - 9 CM 80 - 100% Maximal Assist   10 - 14 CL 60 - 80% Moderate Assist   15 - 19 CK 40 -  60% Moderate Assist   20 - 22 CJ 20 - 40% Minimal Assist   23 CI 1-20% SBA / CGA   24 CH 0% Independent/ Mod I     Patient left supine with call button in reach.    Assessment:   Benny Murray is a 66 y.o. male with a medical diagnosis of Opiate withdrawal and presents with LE weakness, balance dysfunction, and BLE IR (toe-in).    Rehab identified problem list/impairments: Rehab identified problem list/impairments: weakness, gait instability, impaired balance    Rehab potential is good.    Activity tolerance: Good    Discharge recommendations:       Barriers to discharge: Barriers to Discharge: Decreased caregiver support    Equipment recommendations: Equipment Needed After Discharge: cane, straight     GOALS:    Physical Therapy Goals        Problem: Physical Therapy Goal    Goal Priority Disciplines Outcome Goal Variances Interventions   Physical Therapy Goal     PT/OT, PT      Description:  Goals to be met by: 17    Patient will increase functional independence with mobility by performin. Supine to sit with Stand-by Assistance  2. Sit to supine with Stand-by Assistance  3. Gait  x 500 feet with Stand-by Assistance using Single-point Cane.                       PLAN:    Patient to be seen 6 x/week to address the above listed problems via gait training, therapeutic exercises  Plan of Care expires: 17  Plan of Care reviewed with: patient          Keshav T Cameron, PT  2017

## 2017-05-26 NOTE — ASSESSMENT & PLAN NOTE
Noted opiate withdrawal, managed by precedex. Continue symptom control, wean narcotics slowly and hopefully transition out of ICU in next 1-2 days. Taken to 25mcg of fentanyl today.

## 2017-05-26 NOTE — PLAN OF CARE
Fentanyl patch removed per order    1050: precedex titrated per Dr Melchor.  Pt updated on POC and verbalized understanding.    1345: precedex stopped and monitoring.  OT at bedside for eval

## 2017-05-26 NOTE — PLAN OF CARE
05/25/17 2100   Patient Assessment/Suction   Level of Consciousness (AVPU) alert   PRE-TX-O2-ETCO2   O2 Device (Oxygen Therapy) nasal cannula   Flow (L/min) 1   Oxygen Concentration (%) 24   SpO2 99 %   Pulse Oximetry Type Continuous   Pulse 75   Resp 19   BP (!) 193/103   Ready to Wean/Extubation Screen   FIO2<60 (chart decimal) 0.24

## 2017-05-26 NOTE — PROGRESS NOTES
Scheduled pt's pain management follow up for 6-19-17 @ 8:30 a.m. Updated the AVS and Dr. Melchor.

## 2017-05-26 NOTE — PLAN OF CARE
Problem: Occupational Therapy Goal  Goal: Occupational Therapy Goal  OT Evaluation completed. Pt has no OT needs.  EVARISTO Lin

## 2017-05-26 NOTE — ASSESSMENT & PLAN NOTE
Patient with noted chest pain. Will start clonidine, PRN labetolol and monitor closely. May be rebound from precedex.

## 2017-05-27 VITALS
SYSTOLIC BLOOD PRESSURE: 136 MMHG | WEIGHT: 189.13 LBS | HEART RATE: 69 BPM | BODY MASS INDEX: 28.01 KG/M2 | TEMPERATURE: 99 F | RESPIRATION RATE: 18 BRPM | HEIGHT: 69 IN | DIASTOLIC BLOOD PRESSURE: 86 MMHG | OXYGEN SATURATION: 94 %

## 2017-05-27 LAB
ALBUMIN SERPL BCP-MCNC: 3.8 G/DL
ALP SERPL-CCNC: 158 U/L
ALT SERPL W/O P-5'-P-CCNC: 37 U/L
ANION GAP SERPL CALC-SCNC: 10 MMOL/L
AST SERPL-CCNC: 45 U/L
BASOPHILS # BLD AUTO: 0 K/UL
BASOPHILS NFR BLD: 0.2 %
BILIRUB SERPL-MCNC: 0.6 MG/DL
BUN SERPL-MCNC: 14 MG/DL
CALCIUM SERPL-MCNC: 9.4 MG/DL
CHLORIDE SERPL-SCNC: 105 MMOL/L
CO2 SERPL-SCNC: 27 MMOL/L
CREAT SERPL-MCNC: 0.9 MG/DL
DIFFERENTIAL METHOD: ABNORMAL
EOSINOPHIL # BLD AUTO: 0.1 K/UL
EOSINOPHIL NFR BLD: 1.1 %
ERYTHROCYTE [DISTWIDTH] IN BLOOD BY AUTOMATED COUNT: 13.7 %
EST. GFR  (AFRICAN AMERICAN): >60 ML/MIN/1.73 M^2
EST. GFR  (NON AFRICAN AMERICAN): >60 ML/MIN/1.73 M^2
GLUCOSE SERPL-MCNC: 108 MG/DL
HCT VFR BLD AUTO: 38.2 %
HGB BLD-MCNC: 12.6 G/DL
LYMPHOCYTES # BLD AUTO: 3.8 K/UL
LYMPHOCYTES NFR BLD: 32.3 %
MAGNESIUM SERPL-MCNC: 2.1 MG/DL
MCH RBC QN AUTO: 29 PG
MCHC RBC AUTO-ENTMCNC: 32.9 %
MCV RBC AUTO: 88 FL
MONOCYTES # BLD AUTO: 0.9 K/UL
MONOCYTES NFR BLD: 8 %
NEUTROPHILS # BLD AUTO: 6.8 K/UL
NEUTROPHILS NFR BLD: 58.4 %
PHOSPHATE SERPL-MCNC: 2.5 MG/DL
PLATELET # BLD AUTO: 176 K/UL
PMV BLD AUTO: 7.6 FL
POTASSIUM SERPL-SCNC: 3.6 MMOL/L
PROT SERPL-MCNC: 7.5 G/DL
RBC # BLD AUTO: 4.34 M/UL
SODIUM SERPL-SCNC: 142 MMOL/L
WBC # BLD AUTO: 11.6 K/UL

## 2017-05-27 PROCEDURE — 80053 COMPREHEN METABOLIC PANEL: CPT

## 2017-05-27 PROCEDURE — 84100 ASSAY OF PHOSPHORUS: CPT

## 2017-05-27 PROCEDURE — 25000003 PHARM REV CODE 250: Performed by: HOSPITALIST

## 2017-05-27 PROCEDURE — 85025 COMPLETE CBC W/AUTO DIFF WBC: CPT

## 2017-05-27 PROCEDURE — 36415 COLL VENOUS BLD VENIPUNCTURE: CPT

## 2017-05-27 PROCEDURE — 25000003 PHARM REV CODE 250: Performed by: NURSE PRACTITIONER

## 2017-05-27 PROCEDURE — 94761 N-INVAS EAR/PLS OXIMETRY MLT: CPT

## 2017-05-27 PROCEDURE — 83735 ASSAY OF MAGNESIUM: CPT

## 2017-05-27 RX ORDER — BUPRENORPHINE AND NALOXONE 8; 2 MG/1; MG/1
1 FILM, SOLUBLE BUCCAL; SUBLINGUAL DAILY
Qty: 2 PACKET | Refills: 0 | Status: SHIPPED | OUTPATIENT
Start: 2017-05-27 | End: 2017-05-27

## 2017-05-27 RX ORDER — CLONIDINE HYDROCHLORIDE 0.1 MG/1
0.1 TABLET ORAL 3 TIMES DAILY
Qty: 90 TABLET | Refills: 0 | Status: ON HOLD | OUTPATIENT
Start: 2017-05-27 | End: 2017-07-04

## 2017-05-27 RX ORDER — BUPRENORPHINE AND NALOXONE 8; 2 MG/1; MG/1
1 FILM, SOLUBLE BUCCAL; SUBLINGUAL DAILY
Qty: 2 PACKET | Refills: 0 | Status: SHIPPED | OUTPATIENT
Start: 2017-05-27 | End: 2017-05-28

## 2017-05-27 RX ORDER — CLONIDINE HYDROCHLORIDE 0.1 MG/1
0.1 TABLET ORAL 3 TIMES DAILY
Qty: 90 TABLET | Refills: 0 | Status: SHIPPED | OUTPATIENT
Start: 2017-05-27 | End: 2017-05-27

## 2017-05-27 RX ORDER — HALOPERIDOL 2 MG/1
2 TABLET ORAL
Qty: 20 TABLET | Refills: 0 | Status: SHIPPED | OUTPATIENT
Start: 2017-05-27 | End: 2017-05-27 | Stop reason: HOSPADM

## 2017-05-27 RX ORDER — QUETIAPINE FUMARATE 50 MG/1
50 TABLET, FILM COATED ORAL NIGHTLY
Qty: 30 TABLET | Refills: 0 | Status: SHIPPED | OUTPATIENT
Start: 2017-05-27 | End: 2017-06-14

## 2017-05-27 RX ORDER — QUETIAPINE FUMARATE 50 MG/1
50 TABLET, FILM COATED ORAL NIGHTLY
Qty: 30 TABLET | Refills: 0 | Status: SHIPPED | OUTPATIENT
Start: 2017-05-27 | End: 2017-05-27

## 2017-05-27 RX ADMIN — PANTOPRAZOLE SODIUM 40 MG: 40 TABLET, DELAYED RELEASE ORAL at 09:05

## 2017-05-27 RX ADMIN — PRASUGREL HYDROCHLORIDE 10 MG: 10 TABLET, FILM COATED ORAL at 09:05

## 2017-05-27 RX ADMIN — BRIMONIDINE TARTRATE 1 DROP: 1.5 SOLUTION OPHTHALMIC at 09:05

## 2017-05-27 RX ADMIN — LISINOPRIL 5 MG: 2.5 TABLET ORAL at 09:05

## 2017-05-27 RX ADMIN — ATORVASTATIN CALCIUM 40 MG: 40 TABLET, FILM COATED ORAL at 09:05

## 2017-05-27 RX ADMIN — CLONIDINE HYDROCHLORIDE 0.1 MG: 0.1 TABLET ORAL at 05:05

## 2017-05-27 RX ADMIN — FLUOXETINE 40 MG: 20 CAPSULE ORAL at 09:05

## 2017-05-27 RX ADMIN — ACETAMINOPHEN 1000 MG: 500 TABLET, FILM COATED ORAL at 05:05

## 2017-05-27 RX ADMIN — ASPIRIN 81 MG: 81 TABLET, COATED ORAL at 09:05

## 2017-05-27 RX ADMIN — LABETALOL HCL 100 MG: 100 TABLET, FILM COATED ORAL at 09:05

## 2017-05-27 RX ADMIN — TIZANIDINE 2 MG: 2 TABLET ORAL at 01:05

## 2017-05-27 NOTE — SUBJECTIVE & OBJECTIVE
Interval History: Patient remains symptomatic from withdrawals- Improved though. Transitioned off precedex and on to clonidine.    Review of Systems   Constitutional: Positive for fatigue. Negative for fever.   Respiratory: Negative for cough and shortness of breath.    Cardiovascular: Negative for chest pain and leg swelling.   Gastrointestinal: Positive for diarrhea and nausea.   Musculoskeletal: Negative for back pain.   Neurological: Negative for weakness.   Psychiatric/Behavioral: Positive for agitation. Negative for confusion. The patient is nervous/anxious.      Objective:     Vital Signs (Most Recent):  Temp: 99.3 °F (37.4 °C) (05/26/17 1930)  Pulse: 79 (05/26/17 2000)  Resp: (!) 22 (05/26/17 2000)  BP: (!) 160/96 (05/26/17 2000)  SpO2: 97 % (05/26/17 2000) Vital Signs (24h Range):  Temp:  [98 °F (36.7 °C)-99.8 °F (37.7 °C)] 99.3 °F (37.4 °C)  Pulse:  [63-80] 79  Resp:  [10-22] 22  SpO2:  [91 %-99 %] 97 %  BP: ()/() 160/96     Weight: 85.8 kg (189 lb 2.5 oz)  Body mass index is 27.93 kg/m².    Intake/Output Summary (Last 24 hours) at 05/26/17 2012  Last data filed at 05/26/17 1831   Gross per 24 hour   Intake           1730.3 ml   Output             1350 ml   Net            380.3 ml      Physical Exam   Constitutional: He is oriented to person, place, and time. He appears well-developed and well-nourished. No distress.   HENT:   Head: Normocephalic.   Mouth/Throat: Oropharynx is clear and moist.   Eyes: EOM are normal. Pupils are equal, round, and reactive to light. No scleral icterus.   Cardiovascular: Normal rate, regular rhythm, normal heart sounds and intact distal pulses.  Exam reveals no friction rub.    No murmur heard.  Pulmonary/Chest: Effort normal and breath sounds normal. No respiratory distress. He has no wheezes.   Abdominal: Soft. Bowel sounds are normal. He exhibits no distension. There is no tenderness.   Musculoskeletal: Normal range of motion. He exhibits no edema.    Neurological: He is alert and oriented to person, place, and time. He has normal reflexes.   Skin: No rash noted. He is not diaphoretic. No erythema.   Psychiatric: He has a normal mood and affect. His behavior is normal.   Nursing note and vitals reviewed.      Significant Labs: All pertinent labs within the past 24 hours have been reviewed.    Significant Imaging: I have reviewed all pertinent imaging results/findings within the past 24 hours.

## 2017-05-27 NOTE — DISCHARGE SUMMARY
"[facility] Medicine  Discharge Summary      Patient Name: Benny Murray  MRN: [id number]  Admission Date: 5/22/2017  Hospital Length of Stay: 5 days  Discharge Date and Time: No discharge date for patient encounter.  Attending Physician: Darion George MD   Discharging Provider: Darion George MD  Primary Care Provider: Valentino Dickson MD      HPI:   Benny Murray is a 66 y.o. male with HTN, CAD s/p stent placement, and chronic left sciatica.  He was admitted to the service of hospital medicine with concern for serotonin syndrome.  He presented to the ED with self- explained worsening withdrawal symptoms including tremor, diarrhea, agitation, and extreme restlessness. The symptoms Monday morning. He reports transitioning off Fentanyl 150 mg patches for the last 6 years (removed patch Sunday) with the assistance of Dr. Dickson.  He began experiencing his symptoms on Monday AM, he reported to Dr. Dickson's office and was given Suboxone 2 mg in his office.  At this time his symptoms worsened and reaction is well-documented by Dr. Dickson who felt the patient did not wean off his narcotics as directed. His symptoms have worsened since that time.  In the ED he was found to be hypertensive with anxiety, restlessness, and tremor. He was given IV fentanyl, oral oxycodone without improvement in symptoms. He was given 1 mg IV ativan without result.  Given the patient's use of prozac + suboxone, ED physician was concerned for serotonin syndrome vs. Opiate withdrawal.  The patient reports cont'd symptoms upon examination stating "I can't take this."  He states he has never weaned off of narcotics before and has been on them for 11 years.  He states he wanted to wean because his orthopedic/pain clinic would no longer be operating as a pain clinic and were not helpful in assisting him to find pain management.  He denies any chest pain, SOB, nausea, vomiting, fever, or chills.  He does endorse diarrhea, " restless legs, tremor, sweating, and extreme anxiety.  He denies any illicit drug use, no change in medication other than that reported. Other pertinent medical history as below:    * No surgery found *      Indwelling Lines/Drains at time of discharge:   Lines/Drains/Airways     Airway               Airway Anesthesia 02/14/13 1563 days              Hospital Course:   Patient was admitted for narcotic withdrawals. He was slowly weaned from 200mcg of fentanyl down by 1/2 every day. He was initially on precedex for his symptoms and did well, with management of his symptoms as narcotics were weaned. BP was elevated and treated with clonidine.  Patient continued to do well, and was observed for symptoms of withdrawal.  He did not show any of these.  The patient will need to follow-up with his PCP and pain management as an outpt.     Physical exam:  Gen.: No acute distress  Respiratory clear to auscultation  Abdomen soft nontender    Consults:     Significant Diagnostic Studies: Labs:   BMP:   Recent Labs  Lab 05/25/17  1720 05/27/17  0425   * 108    142   K 3.4* 3.6    105   CO2 23 27   BUN 15 14   CREATININE 0.9 0.9   CALCIUM 8.9 9.4   MG 1.7 2.1    and CBC   Recent Labs  Lab 05/26/17  0335 05/27/17  0425   WBC 9.30 11.60   HGB 12.6* 12.6*   HCT 38.0* 38.2*    176       Pending Diagnostic Studies:     Procedure Component Value Units Date/Time    EKG 12-lead [[id number]]     Order Status:  Sent Lab Status:  No result     EKG 12-lead [[id number]]     Order Status:  Sent Lab Status:  No result         Final Active Diagnoses:    Diagnosis Date Noted POA    PRINCIPAL PROBLEM:  Opiate withdrawal [F11.23] 05/23/2017 Yes    Hypertensive urgency [I16.0] 05/25/2017 No    Coronary artery disease involving native coronary artery of native heart without angina pectoris [I25.10] 05/23/2017 Yes    Chronic hepatitis C [B18.2] 12/21/2016 Yes    Recurrent major depressive disorder [F33.9] 03/08/2013 Yes     DDD (degenerative disc disease), lumbosacral [M51.37] 07/13/2012 Yes    Hypertensive retinopathy of both eyes [H35.033] 06/21/2012 Yes      Problems Resolved During this Admission:    Diagnosis Date Noted Date Resolved POA      No new Assessment & Plan notes have been filed under this hospital service since the last note was generated.  Service: Hospital Medicine      Discharged Condition: good    Disposition: Home or Self Care    Follow Up:  Follow-up Information     Nazario Broussard MD On 6/19/2017.    Why:  pain management follow up on 6-19-17 @ 8:30 a.m.   Contact information:  [address] [place] [address] [address]  [place]   [phone]           Valentino Dickson MD In 1 week.    Specialties:  Family Medicine, Internal Medicine  Contact information:  [address] [facility] [address]  [facility] [id number] [phone]                 Patient Instructions:     Ambulatory Referral to Addiction Specialist   Referral Priority: Routine Referral Type: Psychiatric   Referral Reason: Specialty Services Required    Requested Specialty: Addiction Medicine    Number of Visits Requested: 1      Diet general     Activity as tolerated     Call MD for:  temperature >100.4     Call MD for:  persistent nausea and vomiting or diarrhea     Call MD for:  severe uncontrolled pain     Call MD for:  redness, tenderness, or signs of infection (pain, swelling, redness, odor or green/yellow discharge around incision site)       Medications:  Reconciled Home Medications:   Current Discharge Medication List      START taking these medications    Details   cloNIDine (CATAPRES) 0.1 MG tablet Take 1 tablet (0.1 mg total) by mouth 3 (three) times daily.  Qty: 90 tablet, Refills: 0      quetiapine (SEROQUEL) 50 MG tablet Take 1 tablet (50 mg total) by mouth every evening.  Qty: 30 tablet, Refills: 0         CONTINUE these medications which have CHANGED    Details   buprenorphine-naloxone (SUBOXONE) 8-2 mg Film Place 1 packet (1 each total) under the tongue once daily at  "6am.  Qty: 2 packet, Refills: 0         CONTINUE these medications which have NOT CHANGED    Details   aspirin (ECOTRIN) 81 MG EC tablet Take 81 mg by mouth once daily.       atorvastatin (LIPITOR) 40 MG tablet Take 1 tablet by mouth once daily.      BD LUER-RADHA SYRINGE 3 mL 23 x 1 1/2" Syrg       brimonidine 0.15 % OPTH DROP (ALPHAGAN) 0.15 % ophthalmic solution INSTILL 1 DROP INTO EACH EYE TWICE A DAY  Qty: 30 mL, Refills: 0      EFFIENT 10 mg Tab Take 1 tablet by mouth once daily.  Refills: 2      fluoxetine (PROZAC) 40 MG capsule Take 1 capsule (40 mg total) by mouth once daily.  Qty: 30 capsule, Refills: 11    Associated Diagnoses: Episode of recurrent major depressive disorder, unspecified depression episode severity      labetalol (NORMODYNE) 100 MG tablet TAKE ONE TABLET BY MOUTH TWICE DAILY  Qty: 180 tablet, Refills: 1    Associated Diagnoses: Tachycardia; History of MI (myocardial infarction); Essential hypertension, benign      latanoprost 0.005 % ophthalmic solution PLACE 1 DROP INTO THE RIGHT EYE EVERY EVENING  Qty: 7.5 mL, Refills: 0    Comments: Due for follow up      lisinopril (PRINIVIL,ZESTRIL) 5 MG tablet Take 1 tablet by mouth once daily.  Refills: 3      pantoprazole (PROTONIX) 40 MG tablet Take 1 tablet by mouth once daily.  Refills: 3      polyethylene glycol (GLYCOLAX) 17 gram/dose powder TAKE 17 GRAMS MIXED IN LIQUID ONCE DAILY  Qty: 1581 g, Refills: 5    Associated Diagnoses: Constipation, unspecified constipation type      TIZANIDINE HCL (ZANAFLEX ORAL) Take 2 mg by mouth 3 (three) times daily as needed.       verapamil (CALAN-SR) 240 MG CR tablet TAKE 1 TABLET BY MOUTH ONCE q hs  Qty: 90 tablet, Refills: 3    Comments: Generic For:CALAN SR   240MG  Associated Diagnoses: Essential hypertension, benign      zolpidem (AMBIEN) 5 MG Tab Refills: 3           Time spent on the discharge of patient: 35 minutes    Darion George MD  Department of Hospital Medicine  [facility]

## 2017-05-27 NOTE — ASSESSMENT & PLAN NOTE
Noted opiate withdrawal, managed by clonidine. Continue symptom control, weaned narcotics off today.

## 2017-05-27 NOTE — NURSING
Transferred per wheelchair to 208. Ambulates well to wheelchair with cane. Tolerated well. Denies any complaints.

## 2017-05-27 NOTE — ASSESSMENT & PLAN NOTE
Patient doing well, asymptomatic. BP controlled with clonidine, Continue PRN labetolol and monitor closely.

## 2017-05-27 NOTE — PLAN OF CARE
05/27/17 1329   Final Note   Assessment Type Final Discharge Note   Discharge Disposition Home   Discharge planning education complete? Yes        received an order for ambulatory referral to addiction specialist. Met with patient in the siddiqi as he was being wheeled out for discharge.  Provided him with a list of addiction specialist from the [employer] website.  He reported that he will call desired provider and he is returning home alone.  Pt appeared anxious to leave the hospital.

## 2017-05-27 NOTE — NURSING
Discharge instructions given to pt.  Pt discharged to home via w/c with cna. Rx for seroquel and clonidine called to [facility] in [place] per pts request.

## 2017-05-27 NOTE — PROGRESS NOTES
"[facility] Medicine  Progress Note    Patient Name: Benny Murray  MRN: [id number]  Patient Class: IP- Inpatient   Admission Date: 5/22/2017  Length of Stay: 4 days  Attending Physician: Ketan Melchor MD  Primary Care Provider: Valentino Dickson MD        Subjective:     Principal Problem:Opiate withdrawal    HPI:  Benny Murray is a 66 y.o. male with HTN, CAD s/p stent placement, and chronic left sciatica.  He was admitted to the service of hospital medicine with concern for serotonin syndrome.  He presented to the ED with self- explained worsening withdrawal symptoms including tremor, diarrhea, agitation, and extreme restlessness. The symptoms Monday morning. He reports transitioning off Fentanyl 150 mg patches for the last 6 years (removed patch Sunday) with the assistance of Dr. Dickson.  He began experiencing his symptoms on Monday AM, he reported to Dr. Dickson's office and was given Suboxone 2 mg in his office.  At this time his symptoms worsened and reaction is well-documented by Dr. Dickson who felt the patient did not wean off his narcotics as directed. His symptoms have worsened since that time.  In the ED he was found to be hypertensive with anxiety, restlessness, and tremor. He was given IV fentanyl, oral oxycodone without improvement in symptoms. He was given 1 mg IV ativan without result.  Given the patient's use of prozac + suboxone, ED physician was concerned for serotonin syndrome vs. Opiate withdrawal.  The patient reports cont'd symptoms upon examination stating "I can't take this."  He states he has never weaned off of narcotics before and has been on them for 11 years.  He states he wanted to wean because his orthopedic/pain clinic would no longer be operating as a pain clinic and were not helpful in assisting him to find pain management.  He denies any chest pain, SOB, nausea, vomiting, fever, or chills.  He does endorse diarrhea, restless legs, tremor, sweating, and extreme " anxiety.  He denies any illicit drug use, no change in medication other than that reported. Other pertinent medical history as below:    Hospital Course:  Patient was admitted for narcotic withdrawals. He was slowly weaned from 200mcg of fentanyl down by 1/2 every day. He was initially on precedex for his symptoms and did well, with management of his symptoms as narcotics were weaned. BP was elevated and treated with clonidine.    Interval History: Patient remains symptomatic from withdrawals- Improved though. Transitioned off precedex and on to clonidine.    Review of Systems   Constitutional: Positive for fatigue. Negative for fever.   Respiratory: Negative for cough and shortness of breath.    Cardiovascular: Negative for chest pain and leg swelling.   Gastrointestinal: Positive for diarrhea and nausea.   Musculoskeletal: Negative for back pain.   Neurological: Negative for weakness.   Psychiatric/Behavioral: Positive for agitation. Negative for confusion. The patient is nervous/anxious.      Objective:     Vital Signs (Most Recent):  Temp: 99.3 °F (37.4 °C) (05/26/17 1930)  Pulse: 79 (05/26/17 2000)  Resp: (!) 22 (05/26/17 2000)  BP: (!) 160/96 (05/26/17 2000)  SpO2: 97 % (05/26/17 2000) Vital Signs (24h Range):  Temp:  [98 °F (36.7 °C)-99.8 °F (37.7 °C)] 99.3 °F (37.4 °C)  Pulse:  [63-80] 79  Resp:  [10-22] 22  SpO2:  [91 %-99 %] 97 %  BP: ()/() 160/96     Weight: 85.8 kg (189 lb 2.5 oz)  Body mass index is 27.93 kg/m².    Intake/Output Summary (Last 24 hours) at 05/26/17 2012  Last data filed at 05/26/17 1831   Gross per 24 hour   Intake           1730.3 ml   Output             1350 ml   Net            380.3 ml      Physical Exam   Constitutional: He is oriented to person, place, and time. He appears well-developed and well-nourished. No distress.   HENT:   Head: Normocephalic.   Mouth/Throat: Oropharynx is clear and moist.   Eyes: EOM are normal. Pupils are equal, round, and reactive to light. No  scleral icterus.   Cardiovascular: Normal rate, regular rhythm, normal heart sounds and intact distal pulses.  Exam reveals no friction rub.    No murmur heard.  Pulmonary/Chest: Effort normal and breath sounds normal. No respiratory distress. He has no wheezes.   Abdominal: Soft. Bowel sounds are normal. He exhibits no distension. There is no tenderness.   Musculoskeletal: Normal range of motion. He exhibits no edema.   Neurological: He is alert and oriented to person, place, and time. He has normal reflexes.   Skin: No rash noted. He is not diaphoretic. No erythema.   Psychiatric: He has a normal mood and affect. His behavior is normal.   Nursing note and vitals reviewed.      Significant Labs: All pertinent labs within the past 24 hours have been reviewed.    Significant Imaging: I have reviewed all pertinent imaging results/findings within the past 24 hours.    Assessment/Plan:      Hypertensive urgency    Patient doing well, asymptomatic. BP controlled with clonidine, Continue PRN labetolol and monitor closely.          Coronary artery disease involving native coronary artery of native heart without angina pectoris    Chronic without evidence of angina.  Monitor on tele, continue routine medications; ASA/Effiant/statin/BB.  Monitor closely for s/sx angina. PRN NTG.          Chronic hepatitis C    S/p Harvoni 1/17-- HCV RNA undetected since that time with normal liver function testing.  Ammonia normal, follow CMP.  F/U with Dr. Lamb upon discharge.          Recurrent major depressive disorder    Chronic problem. Will continue chronic medication(s), SSRI and monitor for any changes, adjusting as needed.             DDD (degenerative disc disease), lumbosacral    With chronic pain and long-standing opioid dependence.  Weaning as above.          Hypertensive retinopathy of both eyes    Chronic, continue home eye gtt medications.          * Opiate withdrawal    Noted opiate withdrawal, managed by clonidine.  Continue symptom control, weaned narcotics off today.            VTE Risk Mitigation         Ordered     enoxaparin injection 40 mg  Daily     Route:  Subcutaneous        05/23/17 0314     Medium Risk of VTE  Once      05/23/17 0314          Ketan Melchor MD  Department of Hospital Medicine   [facility]

## 2017-05-27 NOTE — HOSPITAL COURSE
Patient was admitted for narcotic withdrawals. He was slowly weaned from 200mcg of fentanyl down by 1/2 every day. He was initially on precedex for his symptoms and did well, with management of his symptoms as narcotics were weaned. BP was elevated and treated with clonidine.  Patient continued to do well, and was observed for symptoms of withdrawal.  He did not show any of these.  The patient will need to follow-up with his PCP and pain management as an outpt.

## 2017-05-27 NOTE — PLAN OF CARE
Problem: Patient Care Overview  Goal: Plan of Care Review  Outcome: Ongoing (interventions implemented as appropriate)  Patient alert and oriented resting in bed. NAD. Denies SOB.C/o generalized pain. VSS. Normal Sr. Urinal at bedside.up with assist of cane.  Plan of care reviewed with patient. Verbalizes understanding.Call light in reach. Pt free from fall or injury. Will monitor.

## 2017-05-29 ENCOUNTER — EPISODE CHANGES (OUTPATIENT)
Dept: HEPATOLOGY | Facility: CLINIC | Age: 67
End: 2017-05-29

## 2017-05-30 ENCOUNTER — PATIENT OUTREACH (OUTPATIENT)
Dept: ADMINISTRATIVE | Facility: CLINIC | Age: 67
End: 2017-05-30
Payer: MEDICARE

## 2017-06-02 ENCOUNTER — HOSPITAL ENCOUNTER (INPATIENT)
Facility: HOSPITAL | Age: 67
LOS: 3 days | Discharge: HOME | DRG: 442 | End: 2017-06-05
Attending: EMERGENCY MEDICINE | Admitting: FAMILY MEDICINE
Payer: MEDICARE

## 2017-06-02 DIAGNOSIS — B17.9 ACUTE HEPATITIS: ICD-10-CM

## 2017-06-02 DIAGNOSIS — Z01.811 PRE-OP CHEST EXAM: ICD-10-CM

## 2017-06-02 DIAGNOSIS — R10.11 RUQ PAIN: ICD-10-CM

## 2017-06-02 DIAGNOSIS — R79.89 ELEVATED LFTS: ICD-10-CM

## 2017-06-02 DIAGNOSIS — R10.11 PAIN, ABDOMINAL, RUQ: Primary | ICD-10-CM

## 2017-06-02 DIAGNOSIS — K81.0 ACUTE ACALCULOUS CHOLECYSTITIS: ICD-10-CM

## 2017-06-02 PROBLEM — I10 ESSENTIAL HYPERTENSION: Status: ACTIVE | Noted: 2017-06-02

## 2017-06-02 PROBLEM — I16.0 HYPERTENSIVE URGENCY: Status: RESOLVED | Noted: 2017-05-25 | Resolved: 2017-06-02

## 2017-06-02 LAB
ALBUMIN SERPL BCP-MCNC: 3.5 G/DL
ALP SERPL-CCNC: 248 U/L
ALT SERPL W/O P-5'-P-CCNC: 52 U/L
ANION GAP SERPL CALC-SCNC: 13 MMOL/L
AST SERPL-CCNC: 109 U/L
BASOPHILS # BLD AUTO: 0 K/UL
BASOPHILS NFR BLD: 0.2 %
BILIRUB SERPL-MCNC: 0.8 MG/DL
BILIRUB UR QL STRIP: NEGATIVE
BUN SERPL-MCNC: 18 MG/DL
CALCIUM SERPL-MCNC: 9 MG/DL
CHLORIDE SERPL-SCNC: 103 MMOL/L
CLARITY UR: CLEAR
CO2 SERPL-SCNC: 21 MMOL/L
COLOR UR: YELLOW
CREAT SERPL-MCNC: 0.9 MG/DL
DIFFERENTIAL METHOD: ABNORMAL
EOSINOPHIL # BLD AUTO: 0.1 K/UL
EOSINOPHIL NFR BLD: 0.6 %
ERYTHROCYTE [DISTWIDTH] IN BLOOD BY AUTOMATED COUNT: 13.7 %
EST. GFR  (AFRICAN AMERICAN): >60 ML/MIN/1.73 M^2
EST. GFR  (NON AFRICAN AMERICAN): >60 ML/MIN/1.73 M^2
GLUCOSE SERPL-MCNC: 124 MG/DL
GLUCOSE UR QL STRIP: NEGATIVE
HCT VFR BLD AUTO: 34.6 %
HGB BLD-MCNC: 11.7 G/DL
HGB UR QL STRIP: NEGATIVE
KETONES UR QL STRIP: NEGATIVE
LEUKOCYTE ESTERASE UR QL STRIP: NEGATIVE
LIPASE SERPL-CCNC: 45 U/L
LYMPHOCYTES # BLD AUTO: 1.8 K/UL
LYMPHOCYTES NFR BLD: 14.3 %
MCH RBC QN AUTO: 29.2 PG
MCHC RBC AUTO-ENTMCNC: 33.8 %
MCV RBC AUTO: 86 FL
MONOCYTES # BLD AUTO: 0.8 K/UL
MONOCYTES NFR BLD: 6.4 %
NEUTROPHILS # BLD AUTO: 9.7 K/UL
NEUTROPHILS NFR BLD: 78.5 %
NITRITE UR QL STRIP: NEGATIVE
PH UR STRIP: 7 [PH] (ref 5–8)
PLATELET # BLD AUTO: 156 K/UL
PMV BLD AUTO: 7.6 FL
POTASSIUM SERPL-SCNC: 3.4 MMOL/L
PROT SERPL-MCNC: 7.1 G/DL
PROT UR QL STRIP: NEGATIVE
RBC # BLD AUTO: 4.01 M/UL
SODIUM SERPL-SCNC: 137 MMOL/L
SP GR UR STRIP: 1.02 (ref 1–1.03)
URN SPEC COLLECT METH UR: NORMAL
UROBILINOGEN UR STRIP-ACNC: 1 EU/DL
WBC # BLD AUTO: 12.3 K/UL

## 2017-06-02 PROCEDURE — 12000002 HC ACUTE/MED SURGE SEMI-PRIVATE ROOM

## 2017-06-02 PROCEDURE — 96375 TX/PRO/DX INJ NEW DRUG ADDON: CPT

## 2017-06-02 PROCEDURE — 36415 COLL VENOUS BLD VENIPUNCTURE: CPT

## 2017-06-02 PROCEDURE — 80053 COMPREHEN METABOLIC PANEL: CPT

## 2017-06-02 PROCEDURE — 25000003 PHARM REV CODE 250: Performed by: PHYSICIAN ASSISTANT

## 2017-06-02 PROCEDURE — 83690 ASSAY OF LIPASE: CPT

## 2017-06-02 PROCEDURE — 85025 COMPLETE CBC W/AUTO DIFF WBC: CPT

## 2017-06-02 PROCEDURE — 25000003 PHARM REV CODE 250: Performed by: EMERGENCY MEDICINE

## 2017-06-02 PROCEDURE — 63600175 PHARM REV CODE 636 W HCPCS: Performed by: EMERGENCY MEDICINE

## 2017-06-02 PROCEDURE — 81003 URINALYSIS AUTO W/O SCOPE: CPT

## 2017-06-02 PROCEDURE — 96365 THER/PROPH/DIAG IV INF INIT: CPT

## 2017-06-02 PROCEDURE — 99285 EMERGENCY DEPT VISIT HI MDM: CPT

## 2017-06-02 RX ORDER — SODIUM CHLORIDE, SODIUM LACTATE, POTASSIUM CHLORIDE, CALCIUM CHLORIDE 600; 310; 30; 20 MG/100ML; MG/100ML; MG/100ML; MG/100ML
INJECTION, SOLUTION INTRAVENOUS CONTINUOUS
Status: DISCONTINUED | OUTPATIENT
Start: 2017-06-02 | End: 2017-06-05 | Stop reason: HOSPADM

## 2017-06-02 RX ORDER — POTASSIUM CHLORIDE 20 MEQ/15ML
40 SOLUTION ORAL
Status: DISCONTINUED | OUTPATIENT
Start: 2017-06-02 | End: 2017-06-05 | Stop reason: HOSPADM

## 2017-06-02 RX ORDER — OXYCODONE HYDROCHLORIDE 5 MG/1
5 TABLET ORAL EVERY 4 HOURS PRN
Status: DISCONTINUED | OUTPATIENT
Start: 2017-06-02 | End: 2017-06-02

## 2017-06-02 RX ORDER — MORPHINE SULFATE 4 MG/ML
8 INJECTION, SOLUTION INTRAMUSCULAR; INTRAVENOUS
Status: COMPLETED | OUTPATIENT
Start: 2017-06-02 | End: 2017-06-02

## 2017-06-02 RX ORDER — KETOROLAC TROMETHAMINE 10 MG/1
10 TABLET, FILM COATED ORAL EVERY 6 HOURS PRN
Status: DISCONTINUED | OUTPATIENT
Start: 2017-06-03 | End: 2017-06-03

## 2017-06-02 RX ORDER — LATANOPROST 50 UG/ML
1 SOLUTION/ DROPS OPHTHALMIC NIGHTLY
Status: DISCONTINUED | OUTPATIENT
Start: 2017-06-02 | End: 2017-06-05 | Stop reason: HOSPADM

## 2017-06-02 RX ORDER — BRIMONIDINE TARTRATE 1.5 MG/ML
1 SOLUTION/ DROPS OPHTHALMIC 2 TIMES DAILY
Status: DISCONTINUED | OUTPATIENT
Start: 2017-06-02 | End: 2017-06-05 | Stop reason: HOSPADM

## 2017-06-02 RX ORDER — CLONIDINE HYDROCHLORIDE 0.1 MG/1
0.1 TABLET ORAL 3 TIMES DAILY
Status: DISCONTINUED | OUTPATIENT
Start: 2017-06-03 | End: 2017-06-03

## 2017-06-02 RX ORDER — ACETAMINOPHEN 325 MG/1
650 TABLET ORAL EVERY 6 HOURS PRN
Status: DISCONTINUED | OUTPATIENT
Start: 2017-06-02 | End: 2017-06-03

## 2017-06-02 RX ORDER — MORPHINE SULFATE 4 MG/ML
4 INJECTION, SOLUTION INTRAMUSCULAR; INTRAVENOUS EVERY 4 HOURS PRN
Status: DISCONTINUED | OUTPATIENT
Start: 2017-06-02 | End: 2017-06-02

## 2017-06-02 RX ORDER — IBUPROFEN 200 MG
24 TABLET ORAL
Status: DISCONTINUED | OUTPATIENT
Start: 2017-06-02 | End: 2017-06-05 | Stop reason: HOSPADM

## 2017-06-02 RX ORDER — IBUPROFEN 200 MG
16 TABLET ORAL
Status: DISCONTINUED | OUTPATIENT
Start: 2017-06-02 | End: 2017-06-05 | Stop reason: HOSPADM

## 2017-06-02 RX ORDER — ENOXAPARIN SODIUM 100 MG/ML
40 INJECTION SUBCUTANEOUS EVERY 24 HOURS
Status: DISCONTINUED | OUTPATIENT
Start: 2017-06-03 | End: 2017-06-05 | Stop reason: HOSPADM

## 2017-06-02 RX ORDER — PRASUGREL 10 MG/1
10 TABLET, FILM COATED ORAL DAILY
Status: DISCONTINUED | OUTPATIENT
Start: 2017-06-03 | End: 2017-06-05 | Stop reason: HOSPADM

## 2017-06-02 RX ORDER — ATORVASTATIN CALCIUM 40 MG/1
40 TABLET, FILM COATED ORAL DAILY
Status: DISCONTINUED | OUTPATIENT
Start: 2017-06-03 | End: 2017-06-03

## 2017-06-02 RX ORDER — GUAIFENESIN/PHENYLPROPANOLAMIN
500 EXPECTORANT ORAL 2 TIMES DAILY
COMMUNITY
End: 2017-07-02

## 2017-06-02 RX ORDER — CLONIDINE HYDROCHLORIDE 0.1 MG/1
0.1 TABLET ORAL
Status: COMPLETED | OUTPATIENT
Start: 2017-06-02 | End: 2017-06-02

## 2017-06-02 RX ORDER — ONDANSETRON 2 MG/ML
4 INJECTION INTRAMUSCULAR; INTRAVENOUS
Status: COMPLETED | OUTPATIENT
Start: 2017-06-02 | End: 2017-06-02

## 2017-06-02 RX ORDER — ONDANSETRON 2 MG/ML
4 INJECTION INTRAMUSCULAR; INTRAVENOUS EVERY 6 HOURS PRN
Status: DISCONTINUED | OUTPATIENT
Start: 2017-06-02 | End: 2017-06-05 | Stop reason: HOSPADM

## 2017-06-02 RX ORDER — AMOXICILLIN 250 MG
1 CAPSULE ORAL 2 TIMES DAILY PRN
Status: DISCONTINUED | OUTPATIENT
Start: 2017-06-02 | End: 2017-06-05 | Stop reason: HOSPADM

## 2017-06-02 RX ORDER — LANOLIN ALCOHOL/MO/W.PET/CERES
800 CREAM (GRAM) TOPICAL
Status: DISCONTINUED | OUTPATIENT
Start: 2017-06-02 | End: 2017-06-05 | Stop reason: HOSPADM

## 2017-06-02 RX ORDER — VERAPAMIL HYDROCHLORIDE 240 MG/1
240 TABLET, FILM COATED, EXTENDED RELEASE ORAL
Status: COMPLETED | OUTPATIENT
Start: 2017-06-02 | End: 2017-06-02

## 2017-06-02 RX ORDER — GLUCAGON 1 MG
1 KIT INJECTION
Status: DISCONTINUED | OUTPATIENT
Start: 2017-06-02 | End: 2017-06-05 | Stop reason: HOSPADM

## 2017-06-02 RX ORDER — HYDROMORPHONE HYDROCHLORIDE 1 MG/ML
1 INJECTION, SOLUTION INTRAMUSCULAR; INTRAVENOUS; SUBCUTANEOUS
Status: COMPLETED | OUTPATIENT
Start: 2017-06-02 | End: 2017-06-02

## 2017-06-02 RX ORDER — POTASSIUM CHLORIDE 20 MEQ/15ML
60 SOLUTION ORAL
Status: DISCONTINUED | OUTPATIENT
Start: 2017-06-02 | End: 2017-06-05 | Stop reason: HOSPADM

## 2017-06-02 RX ORDER — ZOLPIDEM TARTRATE 5 MG/1
5 TABLET ORAL NIGHTLY PRN
Status: DISCONTINUED | OUTPATIENT
Start: 2017-06-02 | End: 2017-06-05 | Stop reason: HOSPADM

## 2017-06-02 RX ORDER — TIZANIDINE 2 MG/1
2 TABLET ORAL 3 TIMES DAILY PRN
Status: DISCONTINUED | OUTPATIENT
Start: 2017-06-02 | End: 2017-06-05 | Stop reason: HOSPADM

## 2017-06-02 RX ORDER — LABETALOL 100 MG/1
100 TABLET, FILM COATED ORAL 2 TIMES DAILY
Status: DISCONTINUED | OUTPATIENT
Start: 2017-06-02 | End: 2017-06-03

## 2017-06-02 RX ORDER — QUETIAPINE FUMARATE 25 MG/1
50 TABLET, FILM COATED ORAL NIGHTLY
Status: DISCONTINUED | OUTPATIENT
Start: 2017-06-02 | End: 2017-06-05 | Stop reason: HOSPADM

## 2017-06-02 RX ORDER — FLUOXETINE HYDROCHLORIDE 20 MG/1
40 CAPSULE ORAL DAILY
Status: DISCONTINUED | OUTPATIENT
Start: 2017-06-03 | End: 2017-06-05 | Stop reason: HOSPADM

## 2017-06-02 RX ORDER — LISINOPRIL 2.5 MG/1
5 TABLET ORAL DAILY
Status: DISCONTINUED | OUTPATIENT
Start: 2017-06-03 | End: 2017-06-03

## 2017-06-02 RX ORDER — BUPRENORPHINE 2 MG/1
2 TABLET SUBLINGUAL DAILY
COMMUNITY
End: 2017-07-02

## 2017-06-02 RX ORDER — ASPIRIN 81 MG/1
81 TABLET ORAL DAILY
Status: DISCONTINUED | OUTPATIENT
Start: 2017-06-03 | End: 2017-06-05 | Stop reason: HOSPADM

## 2017-06-02 RX ORDER — PANTOPRAZOLE SODIUM 40 MG/1
40 TABLET, DELAYED RELEASE ORAL DAILY
Status: DISCONTINUED | OUTPATIENT
Start: 2017-06-03 | End: 2017-06-05 | Stop reason: HOSPADM

## 2017-06-02 RX ORDER — VERAPAMIL HYDROCHLORIDE 240 MG/1
240 TABLET, FILM COATED, EXTENDED RELEASE ORAL NIGHTLY
Status: DISCONTINUED | OUTPATIENT
Start: 2017-06-02 | End: 2017-06-03

## 2017-06-02 RX ORDER — NITROGLYCERIN 0.4 MG/1
0.4 TABLET SUBLINGUAL EVERY 5 MIN PRN
COMMUNITY
End: 2017-10-11 | Stop reason: SDUPTHER

## 2017-06-02 RX ADMIN — ZOLPIDEM TARTRATE 5 MG: 5 TABLET, FILM COATED ORAL at 11:06

## 2017-06-02 RX ADMIN — PIPERACILLIN SODIUM AND TAZOBACTAM SODIUM 4.5 G: 4; .5 INJECTION, POWDER, FOR SOLUTION INTRAVENOUS at 10:06

## 2017-06-02 RX ADMIN — TIZANIDINE 2 MG: 2 TABLET ORAL at 11:06

## 2017-06-02 RX ADMIN — VERAPAMIL HYDROCHLORIDE 240 MG: 240 TABLET, FILM COATED, EXTENDED RELEASE ORAL at 09:06

## 2017-06-02 RX ADMIN — LABETALOL HYDROCHLORIDE 100 MG: 100 TABLET, FILM COATED ORAL at 11:06

## 2017-06-02 RX ADMIN — MORPHINE SULFATE 8 MG: 4 INJECTION INTRAVENOUS at 07:06

## 2017-06-02 RX ADMIN — ONDANSETRON 4 MG: 2 INJECTION INTRAMUSCULAR; INTRAVENOUS at 08:06

## 2017-06-02 RX ADMIN — LATANOPROST 1 DROP: 50 SOLUTION OPHTHALMIC at 11:06

## 2017-06-02 RX ADMIN — BRIMONIDINE TARTRATE 1 DROP: 1.5 SOLUTION OPHTHALMIC at 11:06

## 2017-06-02 RX ADMIN — HYDROMORPHONE HYDROCHLORIDE 1 MG: 1 INJECTION, SOLUTION INTRAMUSCULAR; INTRAVENOUS; SUBCUTANEOUS at 08:06

## 2017-06-02 RX ADMIN — SODIUM CHLORIDE, SODIUM LACTATE, POTASSIUM CHLORIDE, AND CALCIUM CHLORIDE: .6; .31; .03; .02 INJECTION, SOLUTION INTRAVENOUS at 11:06

## 2017-06-02 RX ADMIN — CLONIDINE HYDROCHLORIDE 0.1 MG: 0.1 TABLET ORAL at 09:06

## 2017-06-02 RX ADMIN — QUETIAPINE FUMARATE 50 MG: 25 TABLET, FILM COATED ORAL at 11:06

## 2017-06-03 PROBLEM — R79.89 ELEVATED LFTS: Status: ACTIVE | Noted: 2017-06-03

## 2017-06-03 PROBLEM — R10.11 RUQ PAIN: Status: ACTIVE | Noted: 2017-06-03

## 2017-06-03 PROBLEM — K81.0 ACUTE ACALCULOUS CHOLECYSTITIS: Status: RESOLVED | Noted: 2017-06-02 | Resolved: 2017-06-03

## 2017-06-03 LAB
ABO + RH BLD: NORMAL
ALBUMIN SERPL BCP-MCNC: 3 G/DL
ALBUMIN SERPL BCP-MCNC: 3.2 G/DL
ALP SERPL-CCNC: 419 U/L
ALP SERPL-CCNC: 482 U/L
ALT SERPL W/O P-5'-P-CCNC: 1037 U/L
ALT SERPL W/O P-5'-P-CCNC: 1504 U/L
ANION GAP SERPL CALC-SCNC: 10 MMOL/L
ANION GAP SERPL CALC-SCNC: 12 MMOL/L
APAP SERPL-MCNC: <3 UG/ML
AST SERPL-CCNC: 1316 U/L
AST SERPL-CCNC: 3540 U/L
BASOPHILS # BLD AUTO: 0 K/UL
BASOPHILS NFR BLD: 0.2 %
BILIRUB SERPL-MCNC: 0.8 MG/DL
BILIRUB SERPL-MCNC: 0.9 MG/DL
BLD GP AB SCN CELLS X3 SERPL QL: NORMAL
BUN SERPL-MCNC: 15 MG/DL
BUN SERPL-MCNC: 19 MG/DL
CALCIUM SERPL-MCNC: 8.6 MG/DL
CALCIUM SERPL-MCNC: 9.3 MG/DL
CHLORIDE SERPL-SCNC: 100 MMOL/L
CHLORIDE SERPL-SCNC: 102 MMOL/L
CO2 SERPL-SCNC: 26 MMOL/L
CO2 SERPL-SCNC: 26 MMOL/L
CREAT SERPL-MCNC: 0.9 MG/DL
CREAT SERPL-MCNC: 1.1 MG/DL
DIFFERENTIAL METHOD: ABNORMAL
EOSINOPHIL # BLD AUTO: 0 K/UL
EOSINOPHIL NFR BLD: 0.5 %
ERYTHROCYTE [DISTWIDTH] IN BLOOD BY AUTOMATED COUNT: 14 %
EST. GFR  (AFRICAN AMERICAN): >60 ML/MIN/1.73 M^2
EST. GFR  (AFRICAN AMERICAN): >60 ML/MIN/1.73 M^2
EST. GFR  (NON AFRICAN AMERICAN): >60 ML/MIN/1.73 M^2
EST. GFR  (NON AFRICAN AMERICAN): >60 ML/MIN/1.73 M^2
GLUCOSE SERPL-MCNC: 120 MG/DL
GLUCOSE SERPL-MCNC: 131 MG/DL
HCT VFR BLD AUTO: 35.5 %
HGB BLD-MCNC: 11.8 G/DL
INR PPP: 1.2
INR PPP: 1.4
LACTATE SERPL-SCNC: 1.2 MMOL/L
LIPASE SERPL-CCNC: 56 U/L
LYMPHOCYTES # BLD AUTO: 2.4 K/UL
LYMPHOCYTES NFR BLD: 39.6 %
MAGNESIUM SERPL-MCNC: 1.9 MG/DL
MCH RBC QN AUTO: 29.2 PG
MCHC RBC AUTO-ENTMCNC: 33.2 %
MCV RBC AUTO: 88 FL
MONOCYTES # BLD AUTO: 0.6 K/UL
MONOCYTES NFR BLD: 9 %
NEUTROPHILS # BLD AUTO: 3.1 K/UL
NEUTROPHILS NFR BLD: 50.7 %
PHOSPHATE SERPL-MCNC: 3.6 MG/DL
PLATELET # BLD AUTO: 181 K/UL
PMV BLD AUTO: 7.9 FL
POTASSIUM SERPL-SCNC: 4 MMOL/L
POTASSIUM SERPL-SCNC: 4.1 MMOL/L
PROT SERPL-MCNC: 6.5 G/DL
PROT SERPL-MCNC: 6.8 G/DL
PROTHROMBIN TIME: 12.6 SEC
PROTHROMBIN TIME: 14.4 SEC
RBC # BLD AUTO: 4.03 M/UL
SODIUM SERPL-SCNC: 136 MMOL/L
SODIUM SERPL-SCNC: 140 MMOL/L
WBC # BLD AUTO: 6.1 K/UL

## 2017-06-03 PROCEDURE — 83605 ASSAY OF LACTIC ACID: CPT

## 2017-06-03 PROCEDURE — 80053 COMPREHEN METABOLIC PANEL: CPT | Mod: 91

## 2017-06-03 PROCEDURE — 80053 COMPREHEN METABOLIC PANEL: CPT

## 2017-06-03 PROCEDURE — 85025 COMPLETE CBC W/AUTO DIFF WBC: CPT

## 2017-06-03 PROCEDURE — 63600175 PHARM REV CODE 636 W HCPCS: Performed by: HOSPITALIST

## 2017-06-03 PROCEDURE — 99222 1ST HOSP IP/OBS MODERATE 55: CPT | Mod: ,,, | Performed by: INTERNAL MEDICINE

## 2017-06-03 PROCEDURE — 25000003 PHARM REV CODE 250: Performed by: PHYSICIAN ASSISTANT

## 2017-06-03 PROCEDURE — 12000002 HC ACUTE/MED SURGE SEMI-PRIVATE ROOM

## 2017-06-03 PROCEDURE — 86850 RBC ANTIBODY SCREEN: CPT

## 2017-06-03 PROCEDURE — 85610 PROTHROMBIN TIME: CPT

## 2017-06-03 PROCEDURE — 99223 1ST HOSP IP/OBS HIGH 75: CPT | Mod: ,,, | Performed by: SURGERY

## 2017-06-03 PROCEDURE — 25000003 PHARM REV CODE 250: Performed by: HOSPITALIST

## 2017-06-03 PROCEDURE — 80329 ANALGESICS NON-OPIOID 1 OR 2: CPT

## 2017-06-03 PROCEDURE — 84100 ASSAY OF PHOSPHORUS: CPT

## 2017-06-03 PROCEDURE — 93005 ELECTROCARDIOGRAM TRACING: CPT

## 2017-06-03 PROCEDURE — 83690 ASSAY OF LIPASE: CPT

## 2017-06-03 PROCEDURE — 86900 BLOOD TYPING SEROLOGIC ABO: CPT

## 2017-06-03 PROCEDURE — 36415 COLL VENOUS BLD VENIPUNCTURE: CPT

## 2017-06-03 PROCEDURE — 83735 ASSAY OF MAGNESIUM: CPT

## 2017-06-03 PROCEDURE — 63600175 PHARM REV CODE 636 W HCPCS: Performed by: PHYSICIAN ASSISTANT

## 2017-06-03 PROCEDURE — 80074 ACUTE HEPATITIS PANEL: CPT

## 2017-06-03 RX ORDER — METOPROLOL TARTRATE 25 MG/1
12.5 TABLET ORAL 2 TIMES DAILY
Status: DISCONTINUED | OUTPATIENT
Start: 2017-06-03 | End: 2017-06-05

## 2017-06-03 RX ORDER — OXYCODONE HYDROCHLORIDE 5 MG/1
5 TABLET ORAL EVERY 6 HOURS PRN
Status: DISCONTINUED | OUTPATIENT
Start: 2017-06-03 | End: 2017-06-05 | Stop reason: HOSPADM

## 2017-06-03 RX ORDER — METRONIDAZOLE 500 MG/1
500 TABLET ORAL EVERY 8 HOURS
Status: DISCONTINUED | OUTPATIENT
Start: 2017-06-03 | End: 2017-06-05 | Stop reason: HOSPADM

## 2017-06-03 RX ORDER — MORPHINE SULFATE 2 MG/ML
2 INJECTION, SOLUTION INTRAMUSCULAR; INTRAVENOUS EVERY 4 HOURS PRN
Status: DISCONTINUED | OUTPATIENT
Start: 2017-06-03 | End: 2017-06-04

## 2017-06-03 RX ADMIN — BRIMONIDINE TARTRATE 1 DROP: 1.5 SOLUTION OPHTHALMIC at 09:06

## 2017-06-03 RX ADMIN — ENOXAPARIN SODIUM 40 MG: 100 INJECTION SUBCUTANEOUS at 04:06

## 2017-06-03 RX ADMIN — OXYCODONE HYDROCHLORIDE 5 MG: 5 TABLET ORAL at 06:06

## 2017-06-03 RX ADMIN — Medication 12.5 MG: at 12:06

## 2017-06-03 RX ADMIN — PIPERACILLIN SODIUM AND TAZOBACTAM SODIUM 4.5 G: 4; .5 INJECTION, POWDER, FOR SOLUTION INTRAVENOUS at 05:06

## 2017-06-03 RX ADMIN — Medication 12.5 MG: at 09:06

## 2017-06-03 RX ADMIN — PANTOPRAZOLE SODIUM 40 MG: 40 TABLET, DELAYED RELEASE ORAL at 12:06

## 2017-06-03 RX ADMIN — ACETYLCYSTEINE 4000 MG: 200 INJECTION INTRAVENOUS at 12:06

## 2017-06-03 RX ADMIN — KETOROLAC TROMETHAMINE 10 MG: 10 TABLET, FILM COATED ORAL at 12:06

## 2017-06-03 RX ADMIN — BRIMONIDINE TARTRATE 1 DROP: 1.5 SOLUTION OPHTHALMIC at 11:06

## 2017-06-03 RX ADMIN — PRASUGREL HYDROCHLORIDE 10 MG: 10 TABLET, FILM COATED ORAL at 11:06

## 2017-06-03 RX ADMIN — METRONIDAZOLE 500 MG: 500 TABLET ORAL at 09:06

## 2017-06-03 RX ADMIN — QUETIAPINE FUMARATE 50 MG: 25 TABLET, FILM COATED ORAL at 09:06

## 2017-06-03 RX ADMIN — PIPERACILLIN SODIUM AND TAZOBACTAM SODIUM 4.5 G: 4; .5 INJECTION, POWDER, FOR SOLUTION INTRAVENOUS at 12:06

## 2017-06-03 RX ADMIN — FLUOXETINE 40 MG: 20 CAPSULE ORAL at 12:06

## 2017-06-03 RX ADMIN — KETOROLAC TROMETHAMINE 10 MG: 10 TABLET, FILM COATED ORAL at 05:06

## 2017-06-03 RX ADMIN — ASPIRIN 81 MG: 81 TABLET, COATED ORAL at 12:06

## 2017-06-03 RX ADMIN — TIZANIDINE 2 MG: 2 TABLET ORAL at 04:06

## 2017-06-03 RX ADMIN — METRONIDAZOLE 500 MG: 500 TABLET ORAL at 02:06

## 2017-06-03 RX ADMIN — LATANOPROST 1 DROP: 50 SOLUTION OPHTHALMIC at 09:06

## 2017-06-03 NOTE — ASSESSMENT & PLAN NOTE
I do not suspect acalculous cholecystitis based on normal wbc with no obvious findings of acute cholecystitis on US.  Would obtain HIDA scan, although I am unsure the utility in the setting of acute hepatitis.  Would check acute hepatitis panel- patient reports history of hep c treated with havroni, also has been using tylenol pm to sleep recently

## 2017-06-03 NOTE — SUBJECTIVE & OBJECTIVE
"Past Medical History:   Diagnosis Date    Anemia     Anxiety     Arthritis     Blood transfusion     x4    Cancer BASAL CELL CHEST - NEW DX    Cataract     ou done    Clotting disorder     Coronary artery disease     Depression     Heart abnormalities     Hypertension     Myocardial infarction 10 YRS AGO    silent    Osteoporosis     Wears glasses        Past Surgical History:   Procedure Laterality Date    APPENDECTOMY      Avastin os #2  8-    BACK SURGERY  2003    CATARACT EXTRACTION      os 12-13-12 od d 5-15-13//    EYE SURGERY  LEFT CATARACT 1/13    SPINE SURGERY      lumbar spine       Review of patient's allergies indicates:  No Known Allergies    No current facility-administered medications on file prior to encounter.      Current Outpatient Prescriptions on File Prior to Encounter   Medication Sig    aspirin (ECOTRIN) 81 MG EC tablet Take 81 mg by mouth once daily.     atorvastatin (LIPITOR) 40 MG tablet Take 1 tablet by mouth once daily.    BD LUER-RADHA SYRINGE 3 mL 23 x 1 1/2" Syrg     brimonidine 0.15 % OPTH DROP (ALPHAGAN) 0.15 % ophthalmic solution INSTILL 1 DROP INTO EACH EYE TWICE A DAY    cloNIDine (CATAPRES) 0.1 MG tablet Take 1 tablet (0.1 mg total) by mouth 3 (three) times daily.    EFFIENT 10 mg Tab Take 1 tablet by mouth once daily.    fluoxetine (PROZAC) 40 MG capsule Take 1 capsule (40 mg total) by mouth once daily.    labetalol (NORMODYNE) 100 MG tablet TAKE ONE TABLET BY MOUTH TWICE DAILY    latanoprost 0.005 % ophthalmic solution PLACE 1 DROP INTO THE RIGHT EYE EVERY EVENING    lisinopril (PRINIVIL,ZESTRIL) 5 MG tablet Take 1 tablet by mouth once daily.    pantoprazole (PROTONIX) 40 MG tablet Take 1 tablet by mouth once daily.    polyethylene glycol (GLYCOLAX) 17 gram/dose powder TAKE 17 GRAMS MIXED IN LIQUID ONCE DAILY    quetiapine (SEROQUEL) 50 MG tablet Take 1 tablet (50 mg total) by mouth every evening.    TIZANIDINE HCL (ZANAFLEX ORAL) Take 2 " mg by mouth 3 (three) times daily as needed.     verapamil (CALAN-SR) 240 MG CR tablet TAKE 1 TABLET BY MOUTH ONCE q hs    zolpidem (AMBIEN) 5 MG Tab      Family History     Problem Relation (Age of Onset)    Cancer Maternal Aunt    Heart disease Mother, Father    Pancreatic cancer Sister        Social History Main Topics    Smoking status: Never Smoker    Smokeless tobacco: Not on file    Alcohol use No    Drug use: No    Sexual activity: Yes     Partners: Female     Review of Systems   Constitutional: Negative for chills and fever.   HENT: Negative for congestion and sore throat.    Eyes: Negative for photophobia and visual disturbance.   Respiratory: Negative for chest tightness and shortness of breath.    Cardiovascular: Negative for chest pain and leg swelling.   Gastrointestinal: Positive for abdominal distention and abdominal pain. Negative for blood in stool, diarrhea and vomiting.   Genitourinary: Negative for dysuria and hematuria.   Musculoskeletal: Negative for neck pain and neck stiffness.   Skin: Negative for rash and wound.   Neurological: Negative for syncope and numbness.   Psychiatric/Behavioral: Negative for dysphoric mood. The patient is not nervous/anxious.      Objective:     Vital Signs (Most Recent):  Temp: 99.1 °F (37.3 °C) (06/02/17 1855)  Pulse: 93 (06/02/17 2201)  Resp: 16 (06/02/17 2149)  BP: (!) 141/82 (06/02/17 2201)  SpO2: 95 % (06/02/17 2201) Vital Signs (24h Range):  Temp:  [99.1 °F (37.3 °C)] 99.1 °F (37.3 °C)  Pulse:  [82-95] 93  Resp:  [16] 16  SpO2:  [94 %-97 %] 95 %  BP: (139-184)/() 141/82     Weight: 79.4 kg (175 lb)  Body mass index is 25.84 kg/m².    Physical Exam   Constitutional: He is oriented to person, place, and time. No distress.   HENT:   Head: Normocephalic and atraumatic.   Nose: Nose normal.   Eyes: Right eye exhibits no discharge. Left eye exhibits no discharge. No scleral icterus.   Neck: Neck supple. No JVD present.   Cardiovascular: Normal rate.     No murmur heard.  Pulmonary/Chest: Effort normal. No respiratory distress. He has no wheezes. He has no rales.   Abdominal: Soft. Bowel sounds are normal. He exhibits no distension. There is tenderness in the right upper quadrant.   Musculoskeletal: He exhibits no edema or tenderness.   Neurological: He is alert and oriented to person, place, and time.   Skin: Skin is warm and dry. He is not diaphoretic. There is pallor.   Psychiatric: He has a normal mood and affect. His behavior is normal.   Nursing note and vitals reviewed.       Significant Labs:   Recent Lab Results       06/02/17 2102 06/02/17 1959      Albumin  3.5     Alkaline Phosphatase  248(H)     ALT  52(H)     Anion Gap  13     Appearance, UA Clear      AST  109(H)     Baso #  0.00     Basophil%  0.2     Bilirubin (UA) Negative      Total Bilirubin  0.8  Comment:  For infants and newborns, interpretation of results should be based  on gestational age, weight and in agreement with clinical  observations.  Premature Infant recommended reference ranges:  Up to 24 hours.............<8.0 mg/dL  Up to 48 hours............<12.0 mg/dL  3-5 days..................<15.0 mg/dL  6-29 days.................<15.0 mg/dL       BUN, Bld  18     Calcium  9.0     Chloride  103     CO2  21(L)     Color, UA Yellow      Creatinine  0.9     Differential Method  Automated     eGFR if   >60     eGFR if non   >60  Comment:  Calculation used to obtain the estimated glomerular filtration  rate (eGFR) is the CKD-EPI equation. Since race is unknown   in our information system, the eGFR values for   -American and Non--American patients are given   for each creatinine result.       Eos #  0.1     Eosinophil%  0.6     Glucose  124(H)     Glucose, UA Negative      Gran #  9.7(H)     Gran%  78.5(H)     Hematocrit  34.6(L)     Hemoglobin  11.7(L)     Ketones, UA Negative      Leukocytes, UA Negative      Lipase  45     Lymph #  1.8      Lymph%  14.3(L)     MCH  29.2     MCHC  33.8     MCV  86     Mono #  0.8     Mono%  6.4     MPV  7.6(L)     Nitrite, UA Negative      Occult Blood UA Negative      pH, UA 7.0      Platelets  156     Potassium  3.4(L)     Total Protein  7.1     Protein, UA Negative  Comment:  Recommend a 24 hour urine protein or a urine   protein/creatinine ratio if globulin induced proteinuria is  clinically suspected.        RBC  4.01(L)     RDW  13.7     Sodium  137     Specific Gravity, UA 1.020      Specimen UA Urine, Clean Catch      Urobilinogen, UA 1.0      WBC  12.30         All pertinent labs within the past 24 hours have been reviewed.    Significant Imaging:     Abdominal Ultrasound (preliminary interpretation):  Possible acalculous cholecystitis

## 2017-06-03 NOTE — CONSULTS
[facility]  General Surgery  Consult Note    Patient Name: Benny Murray  MRN: [id number]  Code Status: Full Code  Admission Date: 6/2/2017  Hospital Length of Stay: 1 days  Attending Physician: David Vargas MD  Primary Care Provider: Valentino Dickson MD    Patient information was obtained from patient and ER records.     Inpatient consult to General Surgery  Consult performed by: SOLOMON REHMAN  Consult ordered by: KAILYN MARIE  Reason for consult: acalc cholcystitis  Assessment/Recommendations: Unlikely acalc cholecystitis  Continue workup for hepatitis         Subjective:     Principal Problem: Acute acalculous cholecystitis    History of Present Illness: 67 y/o with RIGHT upper abdominal pain which has been present for a couple of days. He describes the pain as a sharp stabbing which is constant with periods of worsening. He reports some relief with Gas-X and standing. He denies radiation of the pain. He reports it is worse with pressing his RIGHT upper abdomen. He denies fever, chills. He denies vomiting, diarrhea, constipation. He reports feeling abdominal bloating. He denies trauma. He denies chest pain, shortness of breath. He reports poor appetite.        No current facility-administered medications on file prior to encounter.      Current Outpatient Prescriptions on File Prior to Encounter   Medication Sig    aspirin (ECOTRIN) 81 MG EC tablet Take 81 mg by mouth once daily.     atorvastatin (LIPITOR) 40 MG tablet Take 1 tablet by mouth once daily.    brimonidine 0.15 % OPTH DROP (ALPHAGAN) 0.15 % ophthalmic solution INSTILL 1 DROP INTO EACH EYE TWICE A DAY    cloNIDine (CATAPRES) 0.1 MG tablet Take 1 tablet (0.1 mg total) by mouth 3 (three) times daily.    EFFIENT 10 mg Tab Take 1 tablet by mouth once daily.    fluoxetine (PROZAC) 40 MG capsule Take 1 capsule (40 mg total) by mouth once daily.    labetalol (NORMODYNE) 100 MG tablet TAKE ONE TABLET BY MOUTH TWICE DAILY     "latanoprost 0.005 % ophthalmic solution PLACE 1 DROP INTO THE RIGHT EYE EVERY EVENING    lisinopril (PRINIVIL,ZESTRIL) 5 MG tablet Take 1 tablet by mouth once daily.    pantoprazole (PROTONIX) 40 MG tablet Take 1 tablet by mouth once daily.    polyethylene glycol (GLYCOLAX) 17 gram/dose powder TAKE 17 GRAMS MIXED IN LIQUID ONCE DAILY    quetiapine (SEROQUEL) 50 MG tablet Take 1 tablet (50 mg total) by mouth every evening.    TIZANIDINE HCL (ZANAFLEX ORAL) Take 2 mg by mouth 3 (three) times daily as needed.     verapamil (CALAN-SR) 240 MG CR tablet TAKE 1 TABLET BY MOUTH ONCE q hs    zolpidem (AMBIEN) 5 MG Tab     BD LUER-RADHA SYRINGE 3 mL 23 x 1 1/2" Syrg        Review of patient's allergies indicates:  No Known Allergies    Past Medical History:   Diagnosis Date    Anemia     Anxiety     Arthritis     Blood transfusion     x4    Cancer BASAL CELL CHEST - NEW DX    Cataract     ou done    Clotting disorder     Coronary artery disease     Depression     Heart abnormalities     Hypertension     Myocardial infarction 10 YRS AGO    silent    Osteoporosis     Wears glasses      Past Surgical History:   Procedure Laterality Date    APPENDECTOMY      Avastin os #2  8-    BACK SURGERY  2003    CATARACT EXTRACTION      os 12-13-12 od d 5-15-13//    EYE SURGERY  LEFT CATARACT 1/13    SPINE SURGERY      lumbar spine     Family History     Problem Relation (Age of Onset)    Cancer Maternal Aunt    Heart disease Mother, Father    Pancreatic cancer Sister        Social History Main Topics    Smoking status: Never Smoker    Smokeless tobacco: Not on file    Alcohol use No    Drug use: No    Sexual activity: Yes     Partners: Female     Review of Systems   Constitutional: Negative for chills and fever.   HENT: Negative for congestion and sore throat.    Eyes: Negative for photophobia and visual disturbance.   Respiratory: Negative for chest tightness and shortness of breath.    Cardiovascular: " Negative for chest pain and leg swelling.   Gastrointestinal: Positive for abdominal distention and abdominal pain. Negative for blood in stool, diarrhea and vomiting.   Genitourinary: Negative for dysuria and hematuria.   Musculoskeletal: Negative for neck pain and neck stiffness.   Skin: Negative for rash and wound.   Neurological: Negative for syncope and numbness.   Psychiatric/Behavioral: Negative for dysphoric mood. The patient is not nervous/anxious.      Objective:     Vital Signs (Most Recent):  Temp: 97.9 °F (36.6 °C) (06/03/17 0500)  Pulse: 73 (06/03/17 0500)  Resp: 18 (06/03/17 0500)  BP: (!) 94/53 (06/03/17 0500)  SpO2: (!) 94 % (06/03/17 0500) Vital Signs (24h Range):  Temp:  [97.9 °F (36.6 °C)-99.1 °F (37.3 °C)] 97.9 °F (36.6 °C)  Pulse:  [73-95] 73  Resp:  [16-18] 18  SpO2:  [94 %-97 %] 94 %  BP: ()/() 94/53     Weight: 79.4 kg (175 lb 0.7 oz)  Body mass index is 25.85 kg/m².    Physical Exam   Constitutional: He is oriented to person, place, and time. No distress.   HENT:   Head: Normocephalic and atraumatic.   Nose: Nose normal.   Eyes: Right eye exhibits no discharge. Left eye exhibits no discharge. No scleral icterus.   Neck: Neck supple. No JVD present.   Cardiovascular: Normal rate.    No murmur heard.  Pulmonary/Chest: Effort normal. No respiratory distress. He has no wheezes. He has no rales.   Abdominal: Soft. Bowel sounds are normal. He exhibits no distension and no mass. There is tenderness in the right upper quadrant. There is guarding. There is no rebound. No hernia.   No kamara sign   Musculoskeletal: He exhibits no edema or tenderness.   Neurological: He is alert and oriented to person, place, and time.   Skin: Skin is warm and dry. He is not diaphoretic. There is pallor.   Psychiatric: He has a normal mood and affect. His behavior is normal.   Nursing note and vitals reviewed.      Significant Labs:  CBC:   Recent Labs  Lab 06/03/17  0542   WBC 6.10   RBC 4.03*   HGB 11.8*    HCT 35.5*      MCV 88   MCH 29.2   MCHC 33.2     CMP:   Recent Labs  Lab 06/03/17  0542   *   CALCIUM 9.3   ALBUMIN 3.2*   PROT 6.8      K 4.1   CO2 26      BUN 19   CREATININE 1.1   ALKPHOS 482*   ALT 1,504*   AST 3,540*   BILITOT 0.9       Significant Diagnostics:  U/S: I have reviewed all pertinent results/findings within the past 24 hours and my personal findings are:  gb wall normal, no fluid, no hyperemia; distended gallbladder with no stones    Assessment/Plan:     * Acute acalculous cholecystitis    I do not suspect acalculous cholecystitis based on normal wbc with no obvious findings of acute cholecystitis on US.  Would obtain HIDA scan, although I am unsure the utility in the setting of acute hepatitis.  Would check acute hepatitis panel- patient reports history of hep c treated with havroni, also has been using tylenol pm to sleep recently          VTE Risk Mitigation         Ordered     enoxaparin injection 40 mg  Daily     Route:  Subcutaneous        06/02/17 2310     Medium Risk of VTE  Once      06/02/17 2310          Thank you for your consult. I will follow-up with patient. Please contact us if you have any additional questions.    Justin Fulton MD  General Surgery  [facility]

## 2017-06-03 NOTE — HPI
65 y/o with RIGHT upper abdominal pain which has been present for a couple of days. He describes the pain as a sharp stabbing which is constant with periods of worsening. He reports some relief with Gas-X and standing. He denies radiation of the pain. He reports it is worse with pressing his RIGHT upper abdomen. He denies fever, chills. He denies vomiting, diarrhea, constipation. He reports feeling abdominal bloating. He denies trauma. He denies chest pain, shortness of breath. He reports poor appetite.

## 2017-06-03 NOTE — ED NOTES
Pt presnts with c/o RUQ abdominal pain without N/V since this past Sunday intermittent but now constant; decreased appetite. 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

## 2017-06-03 NOTE — H&P
[facility] Hospital Medicine  History & Physical    Patient Name: Benny Murray  MRN: [id number]  Admission Date: 6/2/2017  Attending Physician: Yuliana Durant MD   Primary Care Provider: Valentino Dickson MD         Patient information was obtained from patient, past medical records and ER records.     Subjective:     Principal Problem:Acute acalculous cholecystitis    Chief Complaint:   Chief Complaint   Patient presents with    Abdominal Pain     RUQ pain x 3 days.  Intermittent worsening today.  Pt took 3 doses of Nitro with no relief         HPI: Mr. Murray presents for evaluation of RIGHT upper abdominal pain which has been present for a couple of days. He describes the pain as a dull sensation which is constant with periods of worsening. He reports some relief with Gas-X and standing. He denies radiation of the pain. He reports it is worse with pressing his RIGHT upper abdomen. He denies fever, chills. He denies vomiting, diarrhea, constipation. He reports feeling abdominal bloating. He denies trauma. He denies chest pain, shortness of breath. He reports poor appetite.     He was evaluated in the ED. He was found to have significant tenderness to palpation of his RIGHT upper abdomen. An abdominal ultrasound reveals findings suspicious of acalculous cholecystitis.    Past Medical History:   Diagnosis Date    Anemia     Anxiety     Arthritis     Blood transfusion     x4    Cancer BASAL CELL CHEST - NEW DX    Cataract     ou done    Clotting disorder     Coronary artery disease     Depression     Heart abnormalities     Hypertension     Myocardial infarction 10 YRS AGO    silent    Osteoporosis     Wears glasses        Past Surgical History:   Procedure Laterality Date    APPENDECTOMY      Avastin os #2  8-    BACK SURGERY  2003    CATARACT EXTRACTION      os 12-13-12 od d 5-15-13//    EYE SURGERY  LEFT CATARACT 1/13    SPINE SURGERY      lumbar spine       Review  "of patient's allergies indicates:  No Known Allergies    No current facility-administered medications on file prior to encounter.      Current Outpatient Prescriptions on File Prior to Encounter   Medication Sig    aspirin (ECOTRIN) 81 MG EC tablet Take 81 mg by mouth once daily.     atorvastatin (LIPITOR) 40 MG tablet Take 1 tablet by mouth once daily.    BD LUER-RADHA SYRINGE 3 mL 23 x 1 1/2" Syrg     brimonidine 0.15 % OPTH DROP (ALPHAGAN) 0.15 % ophthalmic solution INSTILL 1 DROP INTO EACH EYE TWICE A DAY    cloNIDine (CATAPRES) 0.1 MG tablet Take 1 tablet (0.1 mg total) by mouth 3 (three) times daily.    EFFIENT 10 mg Tab Take 1 tablet by mouth once daily.    fluoxetine (PROZAC) 40 MG capsule Take 1 capsule (40 mg total) by mouth once daily.    labetalol (NORMODYNE) 100 MG tablet TAKE ONE TABLET BY MOUTH TWICE DAILY    latanoprost 0.005 % ophthalmic solution PLACE 1 DROP INTO THE RIGHT EYE EVERY EVENING    lisinopril (PRINIVIL,ZESTRIL) 5 MG tablet Take 1 tablet by mouth once daily.    pantoprazole (PROTONIX) 40 MG tablet Take 1 tablet by mouth once daily.    polyethylene glycol (GLYCOLAX) 17 gram/dose powder TAKE 17 GRAMS MIXED IN LIQUID ONCE DAILY    quetiapine (SEROQUEL) 50 MG tablet Take 1 tablet (50 mg total) by mouth every evening.    TIZANIDINE HCL (ZANAFLEX ORAL) Take 2 mg by mouth 3 (three) times daily as needed.     verapamil (CALAN-SR) 240 MG CR tablet TAKE 1 TABLET BY MOUTH ONCE q hs    zolpidem (AMBIEN) 5 MG Tab      Family History     Problem Relation (Age of Onset)    Cancer Maternal Aunt    Heart disease Mother, Father    Pancreatic cancer Sister        Social History Main Topics    Smoking status: Never Smoker    Smokeless tobacco: Not on file    Alcohol use No    Drug use: No    Sexual activity: Yes     Partners: Female     Review of Systems   Constitutional: Negative for chills and fever.   HENT: Negative for congestion and sore throat.    Eyes: Negative for photophobia and " visual disturbance.   Respiratory: Negative for chest tightness and shortness of breath.    Cardiovascular: Negative for chest pain and leg swelling.   Gastrointestinal: Positive for abdominal distention and abdominal pain. Negative for blood in stool, diarrhea and vomiting.   Genitourinary: Negative for dysuria and hematuria.   Musculoskeletal: Negative for neck pain and neck stiffness.   Skin: Negative for rash and wound.   Neurological: Negative for syncope and numbness.   Psychiatric/Behavioral: Negative for dysphoric mood. The patient is not nervous/anxious.      Objective:     Vital Signs (Most Recent):  Temp: 99.1 °F (37.3 °C) (06/02/17 1855)  Pulse: 93 (06/02/17 2201)  Resp: 16 (06/02/17 2149)  BP: (!) 141/82 (06/02/17 2201)  SpO2: 95 % (06/02/17 2201) Vital Signs (24h Range):  Temp:  [99.1 °F (37.3 °C)] 99.1 °F (37.3 °C)  Pulse:  [82-95] 93  Resp:  [16] 16  SpO2:  [94 %-97 %] 95 %  BP: (139-184)/() 141/82     Weight: 79.4 kg (175 lb)  Body mass index is 25.84 kg/m².    Physical Exam   Constitutional: He is oriented to person, place, and time. No distress.   HENT:   Head: Normocephalic and atraumatic.   Nose: Nose normal.   Eyes: Right eye exhibits no discharge. Left eye exhibits no discharge. No scleral icterus.   Neck: Neck supple. No JVD present.   Cardiovascular: Normal rate.    No murmur heard.  Pulmonary/Chest: Effort normal. No respiratory distress. He has no wheezes. He has no rales.   Abdominal: Soft. Bowel sounds are normal. He exhibits no distension. There is tenderness in the right upper quadrant.   Musculoskeletal: He exhibits no edema or tenderness.   Neurological: He is alert and oriented to person, place, and time.   Skin: Skin is warm and dry. He is not diaphoretic. There is pallor.   Psychiatric: He has a normal mood and affect. His behavior is normal.   Nursing note and vitals reviewed.       Significant Labs:   Recent Lab Results       06/02/17 2102 06/02/17 1959      Albumin  3.5      Alkaline Phosphatase  248(H)     ALT  52(H)     Anion Gap  13     Appearance, UA Clear      AST  109(H)     Baso #  0.00     Basophil%  0.2     Bilirubin (UA) Negative      Total Bilirubin  0.8  Comment:  For infants and newborns, interpretation of results should be based  on gestational age, weight and in agreement with clinical  observations.  Premature Infant recommended reference ranges:  Up to 24 hours.............<8.0 mg/dL  Up to 48 hours............<12.0 mg/dL  3-5 days..................<15.0 mg/dL  6-29 days.................<15.0 mg/dL       BUN, Bld  18     Calcium  9.0     Chloride  103     CO2  21(L)     Color, UA Yellow      Creatinine  0.9     Differential Method  Automated     eGFR if   >60     eGFR if non   >60  Comment:  Calculation used to obtain the estimated glomerular filtration  rate (eGFR) is the CKD-EPI equation. Since race is unknown   in our information system, the eGFR values for   -American and Non--American patients are given   for each creatinine result.       Eos #  0.1     Eosinophil%  0.6     Glucose  124(H)     Glucose, UA Negative      Gran #  9.7(H)     Gran%  78.5(H)     Hematocrit  34.6(L)     Hemoglobin  11.7(L)     Ketones, UA Negative      Leukocytes, UA Negative      Lipase  45     Lymph #  1.8     Lymph%  14.3(L)     MCH  29.2     MCHC  33.8     MCV  86     Mono #  0.8     Mono%  6.4     MPV  7.6(L)     Nitrite, UA Negative      Occult Blood UA Negative      pH, UA 7.0      Platelets  156     Potassium  3.4(L)     Total Protein  7.1     Protein, UA Negative  Comment:  Recommend a 24 hour urine protein or a urine   protein/creatinine ratio if globulin induced proteinuria is  clinically suspected.        RBC  4.01(L)     RDW  13.7     Sodium  137     Specific Gravity, UA 1.020      Specimen UA Urine, Clean Catch      Urobilinogen, UA 1.0      WBC  12.30         All pertinent labs within the past 24 hours have been  reviewed.    Significant Imaging:     Abdominal Ultrasound (preliminary interpretation):  Possible acalculous cholecystitis    Assessment/Plan:     * Acute acalculous cholecystitis    PRN analgesics - avoid opiates if possible  Serial abdominal exams  Consider IV antibiotics - Zosyn  HIDA scan  NPO. IVF  Consult a general surgeon for evaluation for cholecystectomy          Essential hypertension    Continue Clonidine, Lisinopril        Coronary artery disease involving native coronary artery of native heart without angina pectoris    Continue Aspirin, Atorvastatin  Monitor for signs and symptoms of ACS          Recurrent major depressive disorder    Continue Fluoxetine          Glaucoma suspect    Continue ophthalmics            VTE Risk Mitigation         Ordered     enoxaparin injection 40 mg  Daily     Route:  Subcutaneous        06/02/17 2310     Medium Risk of VTE  Once      06/02/17 2310        Jayla Heart PA-C  Department of Hospital Medicine   [facility]

## 2017-06-03 NOTE — SUBJECTIVE & OBJECTIVE
"No current facility-administered medications on file prior to encounter.      Current Outpatient Prescriptions on File Prior to Encounter   Medication Sig    aspirin (ECOTRIN) 81 MG EC tablet Take 81 mg by mouth once daily.     atorvastatin (LIPITOR) 40 MG tablet Take 1 tablet by mouth once daily.    brimonidine 0.15 % OPTH DROP (ALPHAGAN) 0.15 % ophthalmic solution INSTILL 1 DROP INTO EACH EYE TWICE A DAY    cloNIDine (CATAPRES) 0.1 MG tablet Take 1 tablet (0.1 mg total) by mouth 3 (three) times daily.    EFFIENT 10 mg Tab Take 1 tablet by mouth once daily.    fluoxetine (PROZAC) 40 MG capsule Take 1 capsule (40 mg total) by mouth once daily.    labetalol (NORMODYNE) 100 MG tablet TAKE ONE TABLET BY MOUTH TWICE DAILY    latanoprost 0.005 % ophthalmic solution PLACE 1 DROP INTO THE RIGHT EYE EVERY EVENING    lisinopril (PRINIVIL,ZESTRIL) 5 MG tablet Take 1 tablet by mouth once daily.    pantoprazole (PROTONIX) 40 MG tablet Take 1 tablet by mouth once daily.    polyethylene glycol (GLYCOLAX) 17 gram/dose powder TAKE 17 GRAMS MIXED IN LIQUID ONCE DAILY    quetiapine (SEROQUEL) 50 MG tablet Take 1 tablet (50 mg total) by mouth every evening.    TIZANIDINE HCL (ZANAFLEX ORAL) Take 2 mg by mouth 3 (three) times daily as needed.     verapamil (CALAN-SR) 240 MG CR tablet TAKE 1 TABLET BY MOUTH ONCE q hs    zolpidem (AMBIEN) 5 MG Tab     BD LUER-RADHA SYRINGE 3 mL 23 x 1 1/2" Syrg        Review of patient's allergies indicates:  No Known Allergies    Past Medical History:   Diagnosis Date    Anemia     Anxiety     Arthritis     Blood transfusion     x4    Cancer BASAL CELL CHEST - NEW DX    Cataract     ou done    Clotting disorder     Coronary artery disease     Depression     Heart abnormalities     Hypertension     Myocardial infarction 10 YRS AGO    silent    Osteoporosis     Wears glasses      Past Surgical History:   Procedure Laterality Date    APPENDECTOMY      Avastin os #2  8- "    BACK SURGERY  2003    CATARACT EXTRACTION      os 12-13-12 od d 5-15-13//    EYE SURGERY  LEFT CATARACT 1/13    SPINE SURGERY      lumbar spine     Family History     Problem Relation (Age of Onset)    Cancer Maternal Aunt    Heart disease Mother, Father    Pancreatic cancer Sister        Social History Main Topics    Smoking status: Never Smoker    Smokeless tobacco: Not on file    Alcohol use No    Drug use: No    Sexual activity: Yes     Partners: Female     Review of Systems   Constitutional: Negative for chills and fever.   HENT: Negative for congestion and sore throat.    Eyes: Negative for photophobia and visual disturbance.   Respiratory: Negative for chest tightness and shortness of breath.    Cardiovascular: Negative for chest pain and leg swelling.   Gastrointestinal: Positive for abdominal distention and abdominal pain. Negative for blood in stool, diarrhea and vomiting.   Genitourinary: Negative for dysuria and hematuria.   Musculoskeletal: Negative for neck pain and neck stiffness.   Skin: Negative for rash and wound.   Neurological: Negative for syncope and numbness.   Psychiatric/Behavioral: Negative for dysphoric mood. The patient is not nervous/anxious.      Objective:     Vital Signs (Most Recent):  Temp: 97.9 °F (36.6 °C) (06/03/17 0500)  Pulse: 73 (06/03/17 0500)  Resp: 18 (06/03/17 0500)  BP: (!) 94/53 (06/03/17 0500)  SpO2: (!) 94 % (06/03/17 0500) Vital Signs (24h Range):  Temp:  [97.9 °F (36.6 °C)-99.1 °F (37.3 °C)] 97.9 °F (36.6 °C)  Pulse:  [73-95] 73  Resp:  [16-18] 18  SpO2:  [94 %-97 %] 94 %  BP: ()/() 94/53     Weight: 79.4 kg (175 lb 0.7 oz)  Body mass index is 25.85 kg/m².    Physical Exam   Constitutional: He is oriented to person, place, and time. No distress.   HENT:   Head: Normocephalic and atraumatic.   Nose: Nose normal.   Eyes: Right eye exhibits no discharge. Left eye exhibits no discharge. No scleral icterus.   Neck: Neck supple. No JVD present.    Cardiovascular: Normal rate.    No murmur heard.  Pulmonary/Chest: Effort normal. No respiratory distress. He has no wheezes. He has no rales.   Abdominal: Soft. Bowel sounds are normal. He exhibits no distension and no mass. There is tenderness in the right upper quadrant. There is guarding. There is no rebound. No hernia.   No kamara sign   Musculoskeletal: He exhibits no edema or tenderness.   Neurological: He is alert and oriented to person, place, and time.   Skin: Skin is warm and dry. He is not diaphoretic. There is pallor.   Psychiatric: He has a normal mood and affect. His behavior is normal.   Nursing note and vitals reviewed.      Significant Labs:  CBC:   Recent Labs  Lab 06/03/17  0542   WBC 6.10   RBC 4.03*   HGB 11.8*   HCT 35.5*      MCV 88   MCH 29.2   MCHC 33.2     CMP:   Recent Labs  Lab 06/03/17  0542   *   CALCIUM 9.3   ALBUMIN 3.2*   PROT 6.8      K 4.1   CO2 26      BUN 19   CREATININE 1.1   ALKPHOS 482*   ALT 1,504*   AST 3,540*   BILITOT 0.9       Significant Diagnostics:  U/S: I have reviewed all pertinent results/findings within the past 24 hours and my personal findings are:  gb wall normal, no fluid, no hyperemia; distended gallbladder with no stones

## 2017-06-03 NOTE — CONSULTS
[facility]     CC: Elevated LFTs    HPI 66 y.o. male with elevated LFTs, severe, recent onset, acute, associated with RUQ pain which began a couple of days ago, with no other associated signs/symptoms and no specific alleviating/exacerbating factors.  He denies bleeding or weight loss.  He is a chronic pain patient and has been on fentanyl patch until recently.  He has been weaned off narcotics and states that he has taken 1000 mg tylenol daily for a few days but no more than this.  He also denies taking any other pain medications or acetaminophen containing drugs over the same period.  He was asked about this multiple times.  He is uncertain if he has had EGD or colonoscopy in the past.  He has a history of HCV from a remote blood transfusion and is status post Harvoni treatment for that.  He has been seen by surgery for finding of distended gallbladder on imaging without any evidence of cholecystitis or cholelithiasis.  Dr. Fulton feels that patient likely has acute hepatitis and not cholecystitis.  Case was discussed with him by phone.    Past Medical History:   Diagnosis Date    Anemia     Anxiety     Arthritis     Blood transfusion     x4    Cancer BASAL CELL CHEST - NEW DX    Cataract     ou done    Clotting disorder     Coronary artery disease     Depression     Heart abnormalities     Hypertension     Myocardial infarction 10 YRS AGO    silent    Osteoporosis     Wears glasses        Past Surgical History:   Procedure Laterality Date    APPENDECTOMY      Avastin os #2  8-    BACK SURGERY  2003    CATARACT EXTRACTION      os 12-13-12 od d 5-15-13//    EYE SURGERY  LEFT CATARACT 1/13    SPINE SURGERY      lumbar spine       Social History   Substance Use Topics    Smoking status: Never Smoker    Smokeless tobacco: Not on file    Alcohol use No       Family History   Problem Relation Age of Onset    Heart disease Mother     Heart disease Father     Cancer Maternal  "Aunt      lymphoma    Pancreatic cancer Sister     Amblyopia Neg Hx     Blindness Neg Hx     Cataracts Neg Hx     Diabetes Neg Hx     Glaucoma Neg Hx     Hypertension Neg Hx     Macular degeneration Neg Hx     Retinal detachment Neg Hx     Strabismus Neg Hx     Stroke Neg Hx     Thyroid disease Neg Hx        Review of Systems  General ROS: negative for - chills, fever or weight loss  Psychological ROS: negative for - hallucination, depression or suicidal ideation  Ophthalmic ROS: negative for - blurry vision, photophobia or eye pain  ENT ROS: negative for - epistaxis, sore throat or rhinorrhea  Respiratory ROS: no cough, shortness of breath, or wheezing  Cardiovascular ROS: no chest pain or dyspnea on exertion  Gastrointestinal ROS: + RUQ pain  Genito-Urinary ROS: no dysuria, trouble voiding, or hematuria  Musculoskeletal ROS: negative for - arthralgia, myalgia, weakness, + chronic back pain.  Neurological ROS: no syncope or seizures; no ataxia  Dermatological ROS: negative for pruritis, rash and jaundice    Physical Examination  BP (!) 94/53 (BP Location: Left arm, Patient Position: Lying, BP Method: Automatic)   Pulse 73   Temp 97.9 °F (36.6 °C) (Oral)   Resp 18   Ht 5' 9" (1.753 m)   Wt 79.4 kg (175 lb 0.7 oz)   SpO2 (!) 94%   BMI 25.85 kg/m²   General appearance: alert, cooperative, no distress  HENT: Normocephalic, atraumatic, neck symmetrical, no nasal discharge   Eyes: conjunctivae/corneas clear, PERRL, EOM's intact, sclera anicteric  Lungs: clear to auscultation bilaterally, no dullness to percussion bilaterally, symmetric expansion, breathing unlabored  Heart: regular rate and rhythm without rub; no displacement of the PMI   Abdomen: Soft, TTP in RUQ with mild guarding.  + BS  Extremities: extremities symmetric; no clubbing, cyanosis, or edema  Integument: Skin color, texture, turgor normal; no rashes; hair distrubution normal, no jaundice  Neurologic: Alert and oriented X 3, no focal " sensory or motor neurologic deficits  Psychiatric: no pressured speech; normal affect; no evidence of impaired cognition, no anxiety/depression     Labs:  Lab Results   Component Value Date    WBC 6.10 06/03/2017    HGB 11.8 (L) 06/03/2017    HCT 35.5 (L) 06/03/2017    MCV 88 06/03/2017     06/03/2017       CMP  Sodium   Date Value Ref Range Status   06/03/2017 140 136 - 145 mmol/L Final     Potassium   Date Value Ref Range Status   06/03/2017 4.1 3.5 - 5.1 mmol/L Final     Chloride   Date Value Ref Range Status   06/03/2017 102 95 - 110 mmol/L Final     CO2   Date Value Ref Range Status   06/03/2017 26 23 - 29 mmol/L Final     Glucose   Date Value Ref Range Status   06/03/2017 120 (H) 70 - 110 mg/dL Final     BUN, Bld   Date Value Ref Range Status   06/03/2017 19 8 - 23 mg/dL Final     Creatinine   Date Value Ref Range Status   06/03/2017 1.1 0.5 - 1.4 mg/dL Final   02/05/2013 1.2 0.5 - 1.4 mg/dL Final     Calcium   Date Value Ref Range Status   06/03/2017 9.3 8.7 - 10.5 mg/dL Final   02/05/2013 10.5 8.7 - 10.5 mg/dL Final     Total Protein   Date Value Ref Range Status   06/03/2017 6.8 6.0 - 8.4 g/dL Final     Albumin   Date Value Ref Range Status   06/03/2017 3.2 (L) 3.5 - 5.2 g/dL Final     Total Bilirubin   Date Value Ref Range Status   06/03/2017 0.9 0.1 - 1.0 mg/dL Final     Comment:     For infants and newborns, interpretation of results should be based  on gestational age, weight and in agreement with clinical  observations.  Premature Infant recommended reference ranges:  Up to 24 hours.............<8.0 mg/dL  Up to 48 hours............<12.0 mg/dL  3-5 days..................<15.0 mg/dL  6-29 days.................<15.0 mg/dL       Alkaline Phosphatase   Date Value Ref Range Status   06/03/2017 482 (H) 55 - 135 U/L Final   02/05/2013 56 55 - 135 U/L Final     AST   Date Value Ref Range Status   06/03/2017 3,540 (H) 10 - 40 U/L Final   02/05/2013 47 (H) 10 - 40 U/L Final     ALT   Date Value Ref Range  Status   06/03/2017 1,504 (H) 10 - 44 U/L Final     Anion Gap   Date Value Ref Range Status   06/03/2017 12 8 - 16 mmol/L Final   02/05/2013 9 5 - 15 meq/L Final     eGFR if    Date Value Ref Range Status   06/03/2017 >60 >60 mL/min/1.73 m^2 Final     eGFR if non    Date Value Ref Range Status   06/03/2017 >60 >60 mL/min/1.73 m^2 Final     Comment:     Calculation used to obtain the estimated glomerular filtration  rate (eGFR) is the CKD-EPI equation. Since race is unknown   in our information system, the eGFR values for   -American and Non--American patients are given   for each creatinine result.             Imaging:  Ultrasound was independently visualized and reviewed by me and showed findings as above in HPI.    I have personally reviewed these images    Case was discussed with Dr. Fulton as above in HPI.      Assessment:   1.  Elevated LFTs, likely secondary to acute hepatitis  2.  History of HCV status post Harvoni treatment  3.  Chronic pain with recent weaning off narcotics  4.  RUQ pain  5.  Abnormal imaging    Plan:  1.  Follow LFTs  2.  Serology to evaluate for alternative causes of liver disease  3.  Check acute hepatitis panel  4.  Follow up on HIDA results.  I doubt acalculous cholecystitis given the absence of ongoing critical illness  5.  Avoid acetaminophen for now.  Avoid NSAIDs  6.  Monitor INR for any decompensation in hepatic synthetic function  7.  Further recommendations to follow after above.  8.  Communication will be sent to the referring MD, Dr. Vargas regarding my assessment and plan on this patient via EPIC.      Brice Camargo MD  [facility]  [facility] [facility] [address], [address]  [place] [address]  Office: [phone]  Fax: [phone]

## 2017-06-03 NOTE — HPI
Mr. Murray presents for evaluation of RIGHT upper abdominal pain which has been present for a couple of days. He describes the pain as a dull sensation which is constant with periods of worsening. He reports some relief with Gas-X and standing. He denies radiation of the pain. He reports it is worse with pressing his RIGHT upper abdomen. He denies fever, chills. He denies vomiting, diarrhea, constipation. He reports feeling abdominal bloating. He denies trauma. He denies chest pain, shortness of breath. He reports poor appetite.     He was evaluated in the ED. He was found to have significant tenderness to palpation of his RIGHT upper abdomen. An abdominal ultrasound reveals findings suspicious of acalculous cholecystitis.

## 2017-06-03 NOTE — ASSESSMENT & PLAN NOTE
PRN analgesics - avoid opiates if possible  Serial abdominal exams  Consider IV antibiotics - Zosyn  HIDA scan  NPO. IVF  Consult a general surgeon for evaluation for cholecystectomy

## 2017-06-03 NOTE — PLAN OF CARE
Problem: Patient Care Overview  Goal: Plan of Care Review  Pt vital signs stable at this time. Rt FA IV infusing LR @ 100 cc/hr and antibiotics as ordered without any difficulty. Pt c/o RUQ pain, current pain meds effective at this time. Call light within pt reach, pt instructed to call staff for any needed assistance, Q2hr frequent checks performed, safety maintained. Pt denies needs/concerns at this time, will continue to monitor.

## 2017-06-03 NOTE — ED PROVIDER NOTES
Encounter Date: 6/2/2017    SCRIBE #1 NOTE: IParesh, am scribing for, and in the presence of, Dr. Dillard.       History     Chief Complaint   Patient presents with    Abdominal Pain     RUQ pain x 3 days.  Intermittent worsening today.  Pt took 3 doses of Nitro with no relief      Review of patient's allergies indicates:  No Known Allergies  06/02/2017  7:03 PM     Chief Complaint: RUQ abd pain      The patient is a 66 y.o. male with a PMHx of anemia; anxiety; arthritis; blood transfusion; cancer- basal cell chest - new dx; cataract; clotting disorder; CAD; depression; heart abnormalities; HTN; MI; and osteoporosis who is presenting via EMS with an acute onset of worsening intermittent RUQ abd pain for the past 4-5 days, which became constant today 1 hr prior to calling EMS. He took Gas-X, which seemed to help before today. Associated symptom of decreased appetite. Pt also c/o dribbling when he urinates that started yesterday. No fever. No cough. No N/V/D. Pt denied any other symptoms. He reported chronic back pain due to previous crushed back. Pt reported a hx of kidney stones. Pt has a past surgical history that includes Spine surgery; Appendectomy; Back surgery; and Avastin os #2.        The history is provided by the patient.     Past Medical History:   Diagnosis Date    Anemia     Anxiety     Arthritis     Blood transfusion     x4    Cancer BASAL CELL CHEST - NEW DX    Cataract     ou done    Clotting disorder     Coronary artery disease     Depression     Heart abnormalities     Hypertension     Myocardial infarction 10 YRS AGO    silent    Osteoporosis     Wears glasses      Past Surgical History:   Procedure Laterality Date    APPENDECTOMY      Avastin os #2  8-    BACK SURGERY  2003    CATARACT EXTRACTION      os 12-13-12 od d 5-15-13//    EYE SURGERY  LEFT CATARACT 1/13    SPINE SURGERY      lumbar spine     Family History   Problem Relation Age of Onset    Heart  disease Mother     Heart disease Father     Cancer Maternal Aunt      lymphoma    Pancreatic cancer Sister     Amblyopia Neg Hx     Blindness Neg Hx     Cataracts Neg Hx     Diabetes Neg Hx     Glaucoma Neg Hx     Hypertension Neg Hx     Macular degeneration Neg Hx     Retinal detachment Neg Hx     Strabismus Neg Hx     Stroke Neg Hx     Thyroid disease Neg Hx      Social History   Substance Use Topics    Smoking status: Never Smoker    Smokeless tobacco: Not on file    Alcohol use No     Review of Systems   Constitutional: Positive for appetite change (Decreased appetite.). Negative for fever.   HENT: Negative for sore throat.    Eyes: Negative for visual disturbance.   Respiratory: Negative for cough and shortness of breath.    Cardiovascular: Negative for chest pain.   Gastrointestinal: Positive for abdominal pain (RUQ abd pain.). Negative for diarrhea, nausea and vomiting.   Genitourinary:        +Dribbling when he urinates.   Musculoskeletal: Positive for back pain (Chronic back pain.).   Skin: Negative for rash.   Neurological: Negative for weakness.   Hematological: Does not bruise/bleed easily.   Psychiatric/Behavioral: Negative for confusion.       Physical Exam     Initial Vitals [06/02/17 1855]   BP Pulse Resp Temp SpO2   (!) 160/99 86 16 99.1 °F (37.3 °C) 97 %     Physical Exam    Nursing note and vitals reviewed.  Constitutional: He appears well-developed and well-nourished.  Non-toxic appearance. No distress.   HENT:   Head: Normocephalic and atraumatic.   Eyes: EOM are normal. Pupils are equal, round, and reactive to light.   Neck: Normal range of motion. Neck supple. No no neck rigidity. No JVD present.   Cardiovascular: Normal rate, regular rhythm, normal heart sounds and intact distal pulses.   Abdominal: Soft. He exhibits distension. There is positive Hoang's sign.   Bowel sounds seem hyperactive.   Musculoskeletal: Normal range of motion.   Neurological: He is alert and oriented  to person, place, and time.   Skin: Skin is warm and dry.   Psychiatric: He has a normal mood and affect. His speech is normal and behavior is normal.         ED Course   Procedures  Labs Reviewed   CBC W/ AUTO DIFFERENTIAL - Abnormal; Notable for the following:        Result Value    RBC 4.01 (*)     Hemoglobin 11.7 (*)     Hematocrit 34.6 (*)     MPV 7.6 (*)     Gran # 9.7 (*)     Gran% 78.5 (*)     Lymph% 14.3 (*)     All other components within normal limits   COMPREHENSIVE METABOLIC PANEL - Abnormal; Notable for the following:     Potassium 3.4 (*)     CO2 21 (*)     Glucose 124 (*)     Alkaline Phosphatase 248 (*)      (*)     ALT 52 (*)     All other components within normal limits   LIPASE   URINALYSIS             Medical Decision Making:   History:   Old Medical Records: I decided to obtain old medical records.  Initial Assessment:   66-year-old man presents for evaluation of worsening right upper quadrant abdominal pain.  Exquisite right upper quadrant tenderness with positive Hoang sign.  Laboratory evaluation reveals a normal bilirubin with elevated alkaline phosphatase, AST and ALT.  His white blood cell count is normal and he is afebrile and nontoxic appearing.  Ultrasound shows markedly distended gallbladder with positive sonographic Hoang sign raising suspicion for acalculous cholecystitis.  Patient was started on Zosyn in the ED.  His pain is controlled with IV Dilaudid.  We'll admit for nothing by mouth status, IV fluids, pain control and general surgery evaluation/HIDA scan.  Discussed with the hospitalist who agrees to admit the patient.            Scribe Attestation:   Scribe #1: I performed the above scribed service and the documentation accurately describes the services I performed. I attest to the accuracy of the note.    Attending Attestation:           Physician Attestation for Scribe:  Physician Attestation Statement for Scribe #1: I, Dr. Dillard, reviewed documentation, as scribed  by Paresh Marie in my presence, and it is both accurate and complete.                 ED Course     Clinical Impression:   The primary encounter diagnosis was Pain, abdominal, RUQ. A diagnosis of Acute acalculous cholecystitis was also pertinent to this visit.          Gerardo Dillard MD  06/02/17 3136

## 2017-06-03 NOTE — PLAN OF CARE
Problem: Patient Care Overview  Goal: Plan of Care Review  Outcome: Ongoing (interventions implemented as appropriate)  A&Ox4. Up with assist to commode. IVF infusing per order. IV antibiotics infused per order. VSS. Remains afebrile throughout shift. Remains fall-free throughout shift. Comfort level established. Good pain control with prn medications. Bed low, brakes locked, SR up x2, call light within reach. Verbalized understanding of poc. Open communication facilitated. Will continue to monitor.

## 2017-06-04 PROBLEM — B17.9 ACUTE HEPATITIS: Status: ACTIVE | Noted: 2017-06-03

## 2017-06-04 LAB
ALBUMIN SERPL BCP-MCNC: 3 G/DL
ALP SERPL-CCNC: 379 U/L
ALT SERPL W/O P-5'-P-CCNC: 813 U/L
ANION GAP SERPL CALC-SCNC: 9 MMOL/L
AST SERPL-CCNC: 756 U/L
BASOPHILS # BLD AUTO: 0 K/UL
BASOPHILS NFR BLD: 0.4 %
BILIRUB SERPL-MCNC: 0.7 MG/DL
BUN SERPL-MCNC: 10 MG/DL
CALCIUM SERPL-MCNC: 8.9 MG/DL
CHLORIDE SERPL-SCNC: 104 MMOL/L
CO2 SERPL-SCNC: 25 MMOL/L
CREAT SERPL-MCNC: 0.8 MG/DL
DIFFERENTIAL METHOD: ABNORMAL
EOSINOPHIL # BLD AUTO: 0.2 K/UL
EOSINOPHIL NFR BLD: 2.2 %
ERYTHROCYTE [DISTWIDTH] IN BLOOD BY AUTOMATED COUNT: 14 %
EST. GFR  (AFRICAN AMERICAN): >60 ML/MIN/1.73 M^2
EST. GFR  (NON AFRICAN AMERICAN): >60 ML/MIN/1.73 M^2
FERRITIN SERPL-MCNC: 470 NG/ML
GLUCOSE SERPL-MCNC: 101 MG/DL
HCT VFR BLD AUTO: 33.2 %
HGB BLD-MCNC: 11.1 G/DL
LYMPHOCYTES # BLD AUTO: 2.9 K/UL
LYMPHOCYTES NFR BLD: 42.1 %
MAGNESIUM SERPL-MCNC: 1.8 MG/DL
MCH RBC QN AUTO: 29.2 PG
MCHC RBC AUTO-ENTMCNC: 33.6 %
MCV RBC AUTO: 87 FL
MONOCYTES # BLD AUTO: 0.7 K/UL
MONOCYTES NFR BLD: 10.5 %
NEUTROPHILS # BLD AUTO: 3.1 K/UL
NEUTROPHILS NFR BLD: 44.8 %
PHOSPHATE SERPL-MCNC: 2.4 MG/DL
PLATELET # BLD AUTO: 160 K/UL
PMV BLD AUTO: 7.6 FL
POTASSIUM SERPL-SCNC: 3.6 MMOL/L
PROT SERPL-MCNC: 6.4 G/DL
RBC # BLD AUTO: 3.81 M/UL
SODIUM SERPL-SCNC: 138 MMOL/L
WBC # BLD AUTO: 6.9 K/UL

## 2017-06-04 PROCEDURE — 83735 ASSAY OF MAGNESIUM: CPT

## 2017-06-04 PROCEDURE — 99232 SBSQ HOSP IP/OBS MODERATE 35: CPT | Mod: ,,, | Performed by: SURGERY

## 2017-06-04 PROCEDURE — 86665 EPSTEIN-BARR CAPSID VCA: CPT

## 2017-06-04 PROCEDURE — 85025 COMPLETE CBC W/AUTO DIFF WBC: CPT

## 2017-06-04 PROCEDURE — 25000003 PHARM REV CODE 250: Performed by: HOSPITALIST

## 2017-06-04 PROCEDURE — 80053 COMPREHEN METABOLIC PANEL: CPT

## 2017-06-04 PROCEDURE — 84100 ASSAY OF PHOSPHORUS: CPT

## 2017-06-04 PROCEDURE — 86038 ANTINUCLEAR ANTIBODIES: CPT

## 2017-06-04 PROCEDURE — 86256 FLUORESCENT ANTIBODY TITER: CPT

## 2017-06-04 PROCEDURE — 12000002 HC ACUTE/MED SURGE SEMI-PRIVATE ROOM

## 2017-06-04 PROCEDURE — 83540 ASSAY OF IRON: CPT

## 2017-06-04 PROCEDURE — 25000003 PHARM REV CODE 250: Performed by: PHYSICIAN ASSISTANT

## 2017-06-04 PROCEDURE — 63600175 PHARM REV CODE 636 W HCPCS: Performed by: HOSPITALIST

## 2017-06-04 PROCEDURE — 84466 ASSAY OF TRANSFERRIN: CPT

## 2017-06-04 PROCEDURE — 63600175 PHARM REV CODE 636 W HCPCS: Performed by: PHYSICIAN ASSISTANT

## 2017-06-04 PROCEDURE — 36415 COLL VENOUS BLD VENIPUNCTURE: CPT

## 2017-06-04 PROCEDURE — 82728 ASSAY OF FERRITIN: CPT

## 2017-06-04 PROCEDURE — 86665 EPSTEIN-BARR CAPSID VCA: CPT | Mod: 59

## 2017-06-04 RX ORDER — BUPRENORPHINE 2 MG/1
2 TABLET SUBLINGUAL DAILY
Status: DISCONTINUED | OUTPATIENT
Start: 2017-06-04 | End: 2017-06-04

## 2017-06-04 RX ORDER — BUPRENORPHINE AND NALOXONE 2; .5 MG/1; MG/1
2 FILM, SOLUBLE BUCCAL; SUBLINGUAL DAILY
Status: DISCONTINUED | OUTPATIENT
Start: 2017-06-05 | End: 2017-06-04

## 2017-06-04 RX ORDER — BUPRENORPHINE AND NALOXONE 2; .5 MG/1; MG/1
2 FILM, SOLUBLE BUCCAL; SUBLINGUAL DAILY
Status: DISCONTINUED | OUTPATIENT
Start: 2017-06-04 | End: 2017-06-05

## 2017-06-04 RX ADMIN — TIZANIDINE 2 MG: 2 TABLET ORAL at 08:06

## 2017-06-04 RX ADMIN — TIZANIDINE 2 MG: 2 TABLET ORAL at 06:06

## 2017-06-04 RX ADMIN — ZOLPIDEM TARTRATE 5 MG: 5 TABLET, FILM COATED ORAL at 10:06

## 2017-06-04 RX ADMIN — OXYCODONE HYDROCHLORIDE 5 MG: 5 TABLET ORAL at 01:06

## 2017-06-04 RX ADMIN — OXYCODONE HYDROCHLORIDE 5 MG: 5 TABLET ORAL at 06:06

## 2017-06-04 RX ADMIN — TIZANIDINE 2 MG: 2 TABLET ORAL at 12:06

## 2017-06-04 RX ADMIN — BUPRENORPHINE HYDROCHLORIDE, NALOXONE HYDROCHLORIDE 1 EACH: 2; .5 FILM, SOLUBLE BUCCAL; SUBLINGUAL at 10:06

## 2017-06-04 RX ADMIN — METRONIDAZOLE 500 MG: 500 TABLET ORAL at 09:06

## 2017-06-04 RX ADMIN — PRASUGREL HYDROCHLORIDE 10 MG: 10 TABLET, FILM COATED ORAL at 08:06

## 2017-06-04 RX ADMIN — METRONIDAZOLE 500 MG: 500 TABLET ORAL at 05:06

## 2017-06-04 RX ADMIN — OXYCODONE HYDROCHLORIDE 5 MG: 5 TABLET ORAL at 12:06

## 2017-06-04 RX ADMIN — Medication 12.5 MG: at 08:06

## 2017-06-04 RX ADMIN — Medication 12.5 MG: at 09:06

## 2017-06-04 RX ADMIN — BRIMONIDINE TARTRATE 1 DROP: 1.5 SOLUTION OPHTHALMIC at 09:06

## 2017-06-04 RX ADMIN — QUETIAPINE FUMARATE 50 MG: 25 TABLET, FILM COATED ORAL at 08:06

## 2017-06-04 RX ADMIN — BRIMONIDINE TARTRATE 1 DROP: 1.5 SOLUTION OPHTHALMIC at 08:06

## 2017-06-04 RX ADMIN — PANTOPRAZOLE SODIUM 40 MG: 40 TABLET, DELAYED RELEASE ORAL at 08:06

## 2017-06-04 RX ADMIN — ENOXAPARIN SODIUM 40 MG: 100 INJECTION SUBCUTANEOUS at 05:06

## 2017-06-04 RX ADMIN — METRONIDAZOLE 500 MG: 500 TABLET ORAL at 01:06

## 2017-06-04 RX ADMIN — FLUOXETINE 40 MG: 20 CAPSULE ORAL at 08:06

## 2017-06-04 RX ADMIN — LATANOPROST 1 DROP: 50 SOLUTION OPHTHALMIC at 09:06

## 2017-06-04 RX ADMIN — ASPIRIN 81 MG: 81 TABLET, COATED ORAL at 08:06

## 2017-06-04 RX ADMIN — ZOLPIDEM TARTRATE 5 MG: 5 TABLET, FILM COATED ORAL at 12:06

## 2017-06-04 NOTE — PROGRESS NOTES
[facility] Note    CC: Elevated LFTs    HPI 66 y.o. male with elevated LFTs, severe, recent onset, acute, associated with RUQ pain which began a couple of days ago, with no other associated signs/symptoms and no specific alleviating/exacerbating factors.  He denies bleeding or weight loss.  He is a chronic pain patient and has been on fentanyl patch until recently.  He has been weaned off narcotics and states that he has taken 1000 mg tylenol daily for a few days but no more than this.  He also denies taking any other pain medications or acetaminophen containing drugs over the same period.  He was asked about this multiple times.  He is uncertain if he has had EGD or colonoscopy in the past.  He has a history of HCV from a remote blood transfusion and is status post Harvoni treatment for that.  He has been seen by surgery for finding of distended gallbladder on imaging without any evidence of cholecystitis or cholelithiasis.  Dr. Fulton feels that patient likely has acute hepatitis and not cholecystitis.  Case was discussed with him by phone.    INTERVAL HISTORY:  Spoke with Dr. Fulton in person regarding this case and HIDA findings.  Acalculous cholecystitis seems unlikely despite the imaging findings given that the patient is not critically ill.  He denies new complaints this morning and is asking for diet advancement.  He denies N/V or bleeding.  Serology for acute hepatitis still pending at this time.  INR mildly increased to 1.4.  LFTs markedly improved today.    Past Medical History:   Diagnosis Date    Anemia     Anxiety     Arthritis     Blood transfusion     x4    Cancer BASAL CELL CHEST - NEW DX    Cataract     ou done    Clotting disorder     Coronary artery disease     Depression     Heart abnormalities     Hypertension     Myocardial infarction 10 YRS AGO    silent    Osteoporosis     Wears glasses          Review of Systems  General ROS: negative for - chills, fever or  "weight loss  Cardiovascular ROS: no chest pain or dyspnea on exertion  Gastrointestinal ROS: + RUQ pain    Physical Examination  /81   Pulse 76   Temp 97.7 °F (36.5 °C)   Resp 18   Ht 5' 9" (1.753 m)   Wt 79.4 kg (175 lb 0.7 oz)   SpO2 (!) 94%   BMI 25.85 kg/m²   General appearance: alert, cooperative, no distress  HENT: Normocephalic, atraumatic, neck symmetrical, no nasal discharge, sclera anicteric   Lungs: clear to auscultation bilaterally, symmetric chest wall expansion bilaterally  Heart: regular rate and rhythm without rub; no displacement of the PMI   Abdomen: soft, + TTP in RUQ, + BS  Extremities: extremities symmetric; no clubbing, cyanosis, or edema  Neurologic: Alert and oriented X 3, no sensory or motor neurologic deficits      Labs:  CMP  Sodium   Date Value Ref Range Status   06/04/2017 138 136 - 145 mmol/L Final     Potassium   Date Value Ref Range Status   06/04/2017 3.6 3.5 - 5.1 mmol/L Final     Chloride   Date Value Ref Range Status   06/04/2017 104 95 - 110 mmol/L Final     CO2   Date Value Ref Range Status   06/04/2017 25 23 - 29 mmol/L Final     Glucose   Date Value Ref Range Status   06/04/2017 101 70 - 110 mg/dL Final     BUN, Bld   Date Value Ref Range Status   06/04/2017 10 8 - 23 mg/dL Final     Creatinine   Date Value Ref Range Status   06/04/2017 0.8 0.5 - 1.4 mg/dL Final   02/05/2013 1.2 0.5 - 1.4 mg/dL Final     Calcium   Date Value Ref Range Status   06/04/2017 8.9 8.7 - 10.5 mg/dL Final   02/05/2013 10.5 8.7 - 10.5 mg/dL Final     Total Protein   Date Value Ref Range Status   06/04/2017 6.4 6.0 - 8.4 g/dL Final     Albumin   Date Value Ref Range Status   06/04/2017 3.0 (L) 3.5 - 5.2 g/dL Final     Total Bilirubin   Date Value Ref Range Status   06/04/2017 0.7 0.1 - 1.0 mg/dL Final     Comment:     For infants and newborns, interpretation of results should be based  on gestational age, weight and in agreement with clinical  observations.  Premature Infant recommended " reference ranges:  Up to 24 hours.............<8.0 mg/dL  Up to 48 hours............<12.0 mg/dL  3-5 days..................<15.0 mg/dL  6-29 days.................<15.0 mg/dL       Alkaline Phosphatase   Date Value Ref Range Status   06/04/2017 379 (H) 55 - 135 U/L Final   02/05/2013 56 55 - 135 U/L Final     AST   Date Value Ref Range Status   06/04/2017 756 (H) 10 - 40 U/L Final   02/05/2013 47 (H) 10 - 40 U/L Final     ALT   Date Value Ref Range Status   06/04/2017 813 (H) 10 - 44 U/L Final     Anion Gap   Date Value Ref Range Status   06/04/2017 9 8 - 16 mmol/L Final   02/05/2013 9 5 - 15 meq/L Final     eGFR if    Date Value Ref Range Status   06/04/2017 >60 >60 mL/min/1.73 m^2 Final     eGFR if non    Date Value Ref Range Status   06/04/2017 >60 >60 mL/min/1.73 m^2 Final     Comment:     Calculation used to obtain the estimated glomerular filtration  rate (eGFR) is the CKD-EPI equation. Since race is unknown   in our information system, the eGFR values for   -American and Non--American patients are given   for each creatinine result.       Lab Results   Component Value Date    WBC 6.90 06/04/2017    HGB 11.1 (L) 06/04/2017    HCT 33.2 (L) 06/04/2017    MCV 87 06/04/2017     06/04/2017       Lab Results   Component Value Date    INR 1.4 (H) 06/03/2017    INR 1.2 06/03/2017    INR 1.6 (H) 03/24/2004         Imaging:  HIDA was independently visualized and reviewed by me and showed nonfilling of the GB - hepatitis versus acalculous cholecystitis.      Assessment:   1.  Abnormal imaging  2.  Elevated LFTs - likely acute hepatitis  3.  RUQ pain  4.  Multiple medical problems    Plan:  1.  Continue to trend LFTs  2.  Trend INR  3.  Case discussed with Dr. Fulton  4.  Follow up on acute hepatitis panel.  Consider checking EBV titer.  5.  Will follow.    Brice Camargo MD  [facility]  [address], [address]  [place] [address]  Office: [phone]  Fax: [phone]

## 2017-06-04 NOTE — SUBJECTIVE & OBJECTIVE
Interval History: pain much better    Medications:  Continuous Infusions:   lactated ringers 100 mL/hr at 06/02/17 2341     Scheduled Meds:   aspirin  81 mg Oral Daily    brimonidine 0.15 % OPTH DROP  1 drop Both Eyes BID    enoxaparin  40 mg Subcutaneous Daily    fluoxetine  40 mg Oral Daily    latanoprost  1 drop Right Eye QHS    metoprolol tartrate  12.5 mg Oral BID    metronidazole  500 mg Oral Q8H    pantoprazole  40 mg Oral Daily    prasugrel  10 mg Oral Daily    quetiapine  50 mg Oral QHS     PRN Meds:dextrose 50%, dextrose 50%, glucagon (human recombinant), glucose, glucose, magnesium oxide, magnesium oxide, ondansetron, oxycodone, potassium chloride 10%, potassium chloride 10%, senna-docusate 8.6-50 mg, tizanidine, zolpidem     Review of patient's allergies indicates:  No Known Allergies  Objective:     Vital Signs (Most Recent):  Temp: 97.7 °F (36.5 °C) (06/04/17 1100)  Pulse: 76 (06/04/17 1100)  Resp: 18 (06/04/17 1100)  BP: 129/81 (06/04/17 1100)  SpO2: (!) 94 % (06/04/17 1100) Vital Signs (24h Range):  Temp:  [97.5 °F (36.4 °C)-98.9 °F (37.2 °C)] 97.7 °F (36.5 °C)  Pulse:  [70-93] 76  Resp:  [18] 18  SpO2:  [93 %-95 %] 94 %  BP: (125-141)/(74-89) 129/81     Weight: 79.4 kg (175 lb 0.7 oz)  Body mass index is 25.85 kg/m².    Intake/Output - Last 3 Shifts       06/02 0700 - 06/03 0659 06/03 0700 - 06/04 0659 06/04 0700 - 06/05 0659    P.O.  240     I.V. (mL/kg) 631.7 (8) 1400 (17.6)     IV Piggyback 100      Total Intake(mL/kg) 731.7 (9.2) 1640 (20.7)     Urine (mL/kg/hr) 275 2000 (1)     Stool 0 0 (0)     Total Output 275 2000      Net +456.7 -360             Stool Occurrence 0 x 0 x           Physical Exam   Constitutional: He is oriented to person, place, and time. He appears well-developed and well-nourished. No distress.   HENT:   Head: Atraumatic.   Mouth/Throat: No oropharyngeal exudate.   Eyes: EOM are normal. Pupils are equal, round, and reactive to light. Right eye exhibits no  discharge. Left eye exhibits no discharge. No scleral icterus.   Neck: Normal range of motion. No JVD present. No tracheal deviation present. No thyromegaly present.   Cardiovascular: Normal rate and regular rhythm.  Exam reveals no gallop and no friction rub.    No murmur heard.  Pulmonary/Chest: Breath sounds normal. No respiratory distress. He has no wheezes. He has no rales. He exhibits no tenderness.   Abdominal: Soft. Bowel sounds are normal. He exhibits no distension and no mass. There is no tenderness. There is no rebound and no guarding.   Musculoskeletal: Normal range of motion. He exhibits no edema.   Neurological: He is alert and oriented to person, place, and time.   Skin: No rash noted. He is not diaphoretic. No erythema.   Psychiatric: He has a normal mood and affect.       Significant Labs:  CBC:   Recent Labs  Lab 06/04/17  0524   WBC 6.90   RBC 3.81*   HGB 11.1*   HCT 33.2*      MCV 87   MCH 29.2   MCHC 33.6     CMP:   Recent Labs  Lab 06/04/17  0524      CALCIUM 8.9   ALBUMIN 3.0*   PROT 6.4      K 3.6   CO2 25      BUN 10   CREATININE 0.8   ALKPHOS 379*   *   *   BILITOT 0.7       Significant Diagnostics:  HIDA scan reviewed, no filling of gallbladder

## 2017-06-04 NOTE — PROGRESS NOTES
[facility] Surgery  Progress Note    Subjective:     History of Present Illness:  67 y/o with RIGHT upper abdominal pain which has been present for a couple of days. He describes the pain as a sharp stabbing which is constant with periods of worsening. He reports some relief with Gas-X and standing. He denies radiation of the pain. He reports it is worse with pressing his RIGHT upper abdomen. He denies fever, chills. He denies vomiting, diarrhea, constipation. He reports feeling abdominal bloating. He denies trauma. He denies chest pain, shortness of breath. He reports poor appetite.        Post-Op Info:  * No surgery found *         Interval History: pain much better    Medications:  Continuous Infusions:   lactated ringers 100 mL/hr at 06/02/17 2341     Scheduled Meds:   aspirin  81 mg Oral Daily    brimonidine 0.15 % OPTH DROP  1 drop Both Eyes BID    enoxaparin  40 mg Subcutaneous Daily    fluoxetine  40 mg Oral Daily    latanoprost  1 drop Right Eye QHS    metoprolol tartrate  12.5 mg Oral BID    metronidazole  500 mg Oral Q8H    pantoprazole  40 mg Oral Daily    prasugrel  10 mg Oral Daily    quetiapine  50 mg Oral QHS     PRN Meds:dextrose 50%, dextrose 50%, glucagon (human recombinant), glucose, glucose, magnesium oxide, magnesium oxide, ondansetron, oxycodone, potassium chloride 10%, potassium chloride 10%, senna-docusate 8.6-50 mg, tizanidine, zolpidem     Review of patient's allergies indicates:  No Known Allergies  Objective:     Vital Signs (Most Recent):  Temp: 97.7 °F (36.5 °C) (06/04/17 1100)  Pulse: 76 (06/04/17 1100)  Resp: 18 (06/04/17 1100)  BP: 129/81 (06/04/17 1100)  SpO2: (!) 94 % (06/04/17 1100) Vital Signs (24h Range):  Temp:  [97.5 °F (36.4 °C)-98.9 °F (37.2 °C)] 97.7 °F (36.5 °C)  Pulse:  [70-93] 76  Resp:  [18] 18  SpO2:  [93 %-95 %] 94 %  BP: (125-141)/(74-89) 129/81     Weight: 79.4 kg (175 lb 0.7 oz)  Body mass index is 25.85  kg/m².    Intake/Output - Last 3 Shifts       06/02 0700 - 06/03 0659 06/03 0700 - 06/04 0659 06/04 0700 - 06/05 0659    P.O.  240     I.V. (mL/kg) 631.7 (8) 1400 (17.6)     IV Piggyback 100      Total Intake(mL/kg) 731.7 (9.2) 1640 (20.7)     Urine (mL/kg/hr) 275 2000 (1)     Stool 0 0 (0)     Total Output 275 2000      Net +456.7 -360             Stool Occurrence 0 x 0 x           Physical Exam   Constitutional: He is oriented to person, place, and time. He appears well-developed and well-nourished. No distress.   HENT:   Head: Atraumatic.   Mouth/Throat: No oropharyngeal exudate.   Eyes: EOM are normal. Pupils are equal, round, and reactive to light. Right eye exhibits no discharge. Left eye exhibits no discharge. No scleral icterus.   Neck: Normal range of motion. No JVD present. No tracheal deviation present. No thyromegaly present.   Cardiovascular: Normal rate and regular rhythm.  Exam reveals no gallop and no friction rub.    No murmur heard.  Pulmonary/Chest: Breath sounds normal. No respiratory distress. He has no wheezes. He has no rales. He exhibits no tenderness.   Abdominal: Soft. Bowel sounds are normal. He exhibits no distension and no mass. There is no tenderness. There is no rebound and no guarding.   Musculoskeletal: Normal range of motion. He exhibits no edema.   Neurological: He is alert and oriented to person, place, and time.   Skin: No rash noted. He is not diaphoretic. No erythema.   Psychiatric: He has a normal mood and affect.       Significant Labs:  CBC:   Recent Labs  Lab 06/04/17  0524   WBC 6.90   RBC 3.81*   HGB 11.1*   HCT 33.2*      MCV 87   MCH 29.2   MCHC 33.6     CMP:   Recent Labs  Lab 06/04/17  0524      CALCIUM 8.9   ALBUMIN 3.0*   PROT 6.4      K 3.6   CO2 25      BUN 10   CREATININE 0.8   ALKPHOS 379*   *   *   BILITOT 0.7       Significant Diagnostics:  HIDA scan reviewed, no filling of gallbladder    Assessment/Plan:     Elevated  LFTs    Suspected hepatitis        RUQ pain    I do not suspect acute acalculous cholecystitis despite hida scan, suspect false positive  Clinical picture most consistent with hepatitis    Symptoms improving  Will follow  Diet as tolerated            Justin Fulton MD  General Surgery  [facility]

## 2017-06-04 NOTE — ASSESSMENT & PLAN NOTE
I do not suspect acute acalculous cholecystitis despite hida scan, suspect false positive  Clinical picture most consistent with hepatitis    Symptoms improving  Will follow  Diet as tolerated

## 2017-06-05 ENCOUNTER — TELEPHONE (OUTPATIENT)
Dept: HEPATOLOGY | Facility: CLINIC | Age: 67
End: 2017-06-05

## 2017-06-05 ENCOUNTER — EPISODE CHANGES (OUTPATIENT)
Dept: HEPATOLOGY | Facility: CLINIC | Age: 67
End: 2017-06-05

## 2017-06-05 VITALS
SYSTOLIC BLOOD PRESSURE: 166 MMHG | BODY MASS INDEX: 25.93 KG/M2 | TEMPERATURE: 99 F | HEIGHT: 69 IN | DIASTOLIC BLOOD PRESSURE: 107 MMHG | HEART RATE: 89 BPM | WEIGHT: 175.06 LBS | OXYGEN SATURATION: 92 % | RESPIRATION RATE: 20 BRPM

## 2017-06-05 LAB
ALBUMIN SERPL BCP-MCNC: 3.2 G/DL
ALP SERPL-CCNC: 414 U/L
ALT SERPL W/O P-5'-P-CCNC: 562 U/L
ANA SER QL IF: NORMAL
ANION GAP SERPL CALC-SCNC: 12 MMOL/L
AST SERPL-CCNC: 262 U/L
BASOPHILS # BLD AUTO: 0 K/UL
BASOPHILS NFR BLD: 0.3 %
BILIRUB SERPL-MCNC: 0.5 MG/DL
BUN SERPL-MCNC: 8 MG/DL
CALCIUM SERPL-MCNC: 9.4 MG/DL
CHLORIDE SERPL-SCNC: 102 MMOL/L
CO2 SERPL-SCNC: 24 MMOL/L
CREAT SERPL-MCNC: 0.8 MG/DL
DIFFERENTIAL METHOD: ABNORMAL
EOSINOPHIL # BLD AUTO: 0.1 K/UL
EOSINOPHIL NFR BLD: 2.1 %
ERYTHROCYTE [DISTWIDTH] IN BLOOD BY AUTOMATED COUNT: 13.7 %
EST. GFR  (AFRICAN AMERICAN): >60 ML/MIN/1.73 M^2
EST. GFR  (NON AFRICAN AMERICAN): >60 ML/MIN/1.73 M^2
GLUCOSE SERPL-MCNC: 99 MG/DL
HAV IGM SERPL QL IA: NEGATIVE
HBV CORE IGM SERPL QL IA: NEGATIVE
HBV SURFACE AG SERPL QL IA: NEGATIVE
HCT VFR BLD AUTO: 35.8 %
HCV AB SERPL QL IA: POSITIVE
HGB BLD-MCNC: 12.1 G/DL
IRON SERPL-MCNC: 33 UG/DL
LYMPHOCYTES # BLD AUTO: 3.1 K/UL
LYMPHOCYTES NFR BLD: 45.7 %
MAGNESIUM SERPL-MCNC: 2.1 MG/DL
MCH RBC QN AUTO: 29.4 PG
MCHC RBC AUTO-ENTMCNC: 34 %
MCV RBC AUTO: 87 FL
MONOCYTES # BLD AUTO: 0.7 K/UL
MONOCYTES NFR BLD: 10 %
NEUTROPHILS # BLD AUTO: 2.9 K/UL
NEUTROPHILS NFR BLD: 41.9 %
PHOSPHATE SERPL-MCNC: 2.8 MG/DL
PLATELET # BLD AUTO: 172 K/UL
PMV BLD AUTO: 7.6 FL
POTASSIUM SERPL-SCNC: 3.8 MMOL/L
PROT SERPL-MCNC: 7.3 G/DL
RBC # BLD AUTO: 4.13 M/UL
SATURATED IRON: 13 %
SODIUM SERPL-SCNC: 138 MMOL/L
TOTAL IRON BINDING CAPACITY: 249 UG/DL
TRANSFERRIN SERPL-MCNC: 168 MG/DL
WBC # BLD AUTO: 6.9 K/UL

## 2017-06-05 PROCEDURE — 84100 ASSAY OF PHOSPHORUS: CPT

## 2017-06-05 PROCEDURE — 87522 HEPATITIS C REVRS TRNSCRPJ: CPT

## 2017-06-05 PROCEDURE — 85025 COMPLETE CBC W/AUTO DIFF WBC: CPT

## 2017-06-05 PROCEDURE — 99232 SBSQ HOSP IP/OBS MODERATE 35: CPT | Mod: ,,, | Performed by: SURGERY

## 2017-06-05 PROCEDURE — 99232 SBSQ HOSP IP/OBS MODERATE 35: CPT | Mod: ,,, | Performed by: INTERNAL MEDICINE

## 2017-06-05 PROCEDURE — 25000003 PHARM REV CODE 250: Performed by: PHYSICIAN ASSISTANT

## 2017-06-05 PROCEDURE — 36415 COLL VENOUS BLD VENIPUNCTURE: CPT

## 2017-06-05 PROCEDURE — 83735 ASSAY OF MAGNESIUM: CPT

## 2017-06-05 PROCEDURE — 25000003 PHARM REV CODE 250: Performed by: HOSPITALIST

## 2017-06-05 PROCEDURE — 87536 HIV-1 QUANT&REVRSE TRNSCRPJ: CPT

## 2017-06-05 PROCEDURE — 63600175 PHARM REV CODE 636 W HCPCS: Performed by: PHYSICIAN ASSISTANT

## 2017-06-05 PROCEDURE — 80053 COMPREHEN METABOLIC PANEL: CPT

## 2017-06-05 RX ORDER — BUPRENORPHINE AND NALOXONE 2; .5 MG/1; MG/1
2 FILM, SOLUBLE BUCCAL; SUBLINGUAL DAILY
Status: DISCONTINUED | OUTPATIENT
Start: 2017-06-05 | End: 2017-06-05 | Stop reason: HOSPADM

## 2017-06-05 RX ORDER — LABETALOL 100 MG/1
100 TABLET, FILM COATED ORAL 2 TIMES DAILY
Status: DISCONTINUED | OUTPATIENT
Start: 2017-06-05 | End: 2017-06-05 | Stop reason: HOSPADM

## 2017-06-05 RX ORDER — VERAPAMIL HYDROCHLORIDE 240 MG/1
240 TABLET, FILM COATED, EXTENDED RELEASE ORAL NIGHTLY
Status: DISCONTINUED | OUTPATIENT
Start: 2017-06-05 | End: 2017-06-05 | Stop reason: HOSPADM

## 2017-06-05 RX ORDER — ATORVASTATIN CALCIUM 40 MG/1
40 TABLET, FILM COATED ORAL DAILY
Start: 2017-06-05 | End: 2017-07-02

## 2017-06-05 RX ORDER — OXYCODONE HYDROCHLORIDE 5 MG/1
5 TABLET ORAL EVERY 6 HOURS PRN
Qty: 12 TABLET | Refills: 0 | Status: SHIPPED | OUTPATIENT
Start: 2017-06-05 | End: 2017-06-14

## 2017-06-05 RX ORDER — LISINOPRIL 2.5 MG/1
5 TABLET ORAL DAILY
Status: DISCONTINUED | OUTPATIENT
Start: 2017-06-05 | End: 2017-06-05 | Stop reason: HOSPADM

## 2017-06-05 RX ORDER — CLONIDINE HYDROCHLORIDE 0.1 MG/1
0.1 TABLET ORAL 3 TIMES DAILY
Status: DISCONTINUED | OUTPATIENT
Start: 2017-06-05 | End: 2017-06-05

## 2017-06-05 RX ADMIN — Medication 12.5 MG: at 09:06

## 2017-06-05 RX ADMIN — BRIMONIDINE TARTRATE 1 DROP: 1.5 SOLUTION OPHTHALMIC at 09:06

## 2017-06-05 RX ADMIN — METRONIDAZOLE 500 MG: 500 TABLET ORAL at 05:06

## 2017-06-05 RX ADMIN — ASPIRIN 81 MG: 81 TABLET, COATED ORAL at 09:06

## 2017-06-05 RX ADMIN — LABETALOL HYDROCHLORIDE 100 MG: 100 TABLET, FILM COATED ORAL at 11:06

## 2017-06-05 RX ADMIN — METRONIDAZOLE 500 MG: 500 TABLET ORAL at 01:06

## 2017-06-05 RX ADMIN — TIZANIDINE 2 MG: 2 TABLET ORAL at 01:06

## 2017-06-05 RX ADMIN — FLUOXETINE 40 MG: 20 CAPSULE ORAL at 09:06

## 2017-06-05 RX ADMIN — OXYCODONE HYDROCHLORIDE 5 MG: 5 TABLET ORAL at 01:06

## 2017-06-05 RX ADMIN — PRASUGREL HYDROCHLORIDE 10 MG: 10 TABLET, FILM COATED ORAL at 09:06

## 2017-06-05 RX ADMIN — PANTOPRAZOLE SODIUM 40 MG: 40 TABLET, DELAYED RELEASE ORAL at 09:06

## 2017-06-05 RX ADMIN — TIZANIDINE 2 MG: 2 TABLET ORAL at 03:06

## 2017-06-05 RX ADMIN — LISINOPRIL 5 MG: 2.5 TABLET ORAL at 11:06

## 2017-06-05 RX ADMIN — ONDANSETRON 4 MG: 2 INJECTION INTRAMUSCULAR; INTRAVENOUS at 03:06

## 2017-06-05 RX ADMIN — OXYCODONE HYDROCHLORIDE 5 MG: 5 TABLET ORAL at 07:06

## 2017-06-05 NOTE — PROGRESS NOTES
Patient called nurse into room complaining of nausea and hot flash at this time.      Temp 99.5 at this time.  Zofran 4mg given for complaint of nausea.      Will continue to monitor.

## 2017-06-05 NOTE — PROGRESS NOTES
"[facility] Note    CC: abnormal liver chemistries    HPI 66 y.o. male did well overnight; no abdominal pain or nausea; no melena.  Tolerating PO without issue.    Past Medical History:   Diagnosis Date    Anemia     Anxiety     Arthritis     Blood transfusion     x4    Cancer BASAL CELL CHEST - NEW DX    Cataract     ou done    Clotting disorder     Coronary artery disease     Depression     Heart abnormalities     Hypertension     Myocardial infarction 10 YRS AGO    silent    Osteoporosis     Wears glasses          Review of Systems  General ROS: negative for - chills, fever or weight loss  Cardiovascular ROS: no chest pain or dyspnea on exertion  Gastrointestinal ROS: No abdominal pain, nausea or diarrhea    Physical Examination  BP (!) 143/82   Pulse 101   Temp 99.5 °F (37.5 °C)   Resp 20   Ht 5' 9" (1.753 m)   Wt 79.4 kg (175 lb 0.7 oz)   SpO2 (!) 92%   BMI 25.85 kg/m²   General appearance: alert, cooperative, no distress  HENT: Normocephalic, atraumatic, neck symmetrical, no nasal discharge   Lungs: clear to auscultation bilaterally, symmetric chest wall expansion bilaterally  Abdomen: soft, NT ND BS Present no organomegaly    Labs:   (756)   (813)   (379)  Tbili 0.5  Hep C ab - positive  Hep C viral load - pending    Imaging:  HIDA scan addendum - Addendum: Dr. Fulton requested a 24-hour followup image, and as such, anterior and right lateral images of the abdomen were acquired.  There is no residual radiotracer activity noted within the liver, biliary system, or in the expected vicinity of the gallbladder.  All residual activity is situated within the bowel.     Please note that the lack of radiotracer accumulation within the gallbladder is not necessarily pathognomonic for acute cholecystitis.  A diagnosis of acute cholecystitis requires an appropriate clinical suspicion.  In speaking with Dr. Fulton, the patient has markedly elevated liver function " tests and no fever or elevated white blood cell count, and there is a concern for possible hepatitis.  Please note that poor hepatic function and fasting state can produce false positive HIDA scans.    Assessment:   67 yo male admit with abdominal pain and found to have markedly abnormal liver chemistries with elevated transaminases.  They are improving.  HIDA with poor uptake of tracer into GB - can be nonspecific finding in setting of liver disease.  Patient is s/p treatment for Hepatitis C infection - no evidence of acute hepatitis A or B.  I suspect his symptoms are due to an acute hepatitis that is improving.  No h/o hypotension or dehydration.    Plan:  OK to discharge home from a GI standpoint.  Patient needs repeat labs in 1 week ([facility])  Follow up remainder of viral hepatitis labs as well as Hep C viral load (ordered today)  Follow up with patient's established hepatologist - Dr. Lamb - as an outpatient for further management of his abnormal liver chemistries.    Please re-consult as needed for any additional GI issues.    Dante Juarez MD  [facility]  [address], [address]  [place] [address]  Office: [phone]  Fax: [phone]

## 2017-06-05 NOTE — PLAN OF CARE
Problem: Patient Care Overview  Goal: Plan of Care Review  Outcome: Ongoing (interventions implemented as appropriate)  A&Ox4. Up with assist to commode. IVF infusing per order. VSS. Remains afebrile throughout shift. Remains fall-free throughout shift. Comfort level established. Good pain control with prn medications. Bed low, brakes locked, SR up x2, call light within reach. Verbalized understanding of poc. Open communication facilitated. Will continue to monitor.

## 2017-06-05 NOTE — TELEPHONE ENCOUNTER
----- Message from Shasha Main sent at 6/5/2017  4:11 PM CDT -----  Contact: [place] with case management  Calling to schedule pt's 1wk f/u please call

## 2017-06-05 NOTE — HOSPITAL COURSE
6/3 patient admitted on supportive care. Transaminitis of significant value. Gi and surgery consulted, abdominal ultrasound showing distended gallbladder but bile duct not significantly distended and gallbladder wall not thickened and no SIRS.  6/4 HIDA positive, but clinically not consistent with acalculous bear.  Transaminitis improved significantly. Laboratory serology obtained for etiology of underlying transaminitis.  6/5 Transaminitis continued to resolve, pain resolved and tolerating diet.  Patient discharged home and statin held given possibility of medication induced hepatitis and follow up to be arranged with Dr. Lamb.

## 2017-06-05 NOTE — DISCHARGE INSTRUCTIONS
Thank you for choosing [facility] for your medical care. The primary doctor who is taking care of you at the time of your discharge is David Vargas MD.     You were admitted to the hospital with Acute acalculous cholecystitis.     Please note your discharge instructions, including diet/activity restrictions, follow-up appointments, and medication changes.  If you have any questions about your medical issues, prescriptions, or any other questions, please feel free to contact the [facility] Medicine Dept at [phone] and we will help.    If you are previously with Home health, outpatient PT/OT or under a therapy program, you are cleared to return to those programs.    Please direct all long term medication refills and follow up to your primary care provider, Valentino Dickson MD. Thank you again for letting us take care of your health care needs.    Please note the following discharge instructions per your discharging physician-      Follow up with Dr. Lamb.  Hold your rosuvastatin until you see her in clinic.

## 2017-06-05 NOTE — SUBJECTIVE & OBJECTIVE
Interval History: HIDA positive, still not c/w cholecystitis.  Transaminitis resolving.    Review of Systems   Constitutional: Negative for fatigue and fever.   Respiratory: Negative for cough, chest tightness and shortness of breath.    Cardiovascular: Negative for chest pain, palpitations and leg swelling.   Gastrointestinal: Positive for abdominal pain and nausea. Negative for abdominal distention, constipation, diarrhea and vomiting.   Genitourinary: Negative for difficulty urinating and dysuria.   Skin: Negative for rash and wound.   Neurological: Negative for dizziness and numbness.     Objective:     Vital Signs (Most Recent):  Temp: 99 °F (37.2 °C) (06/04/17 1901)  Pulse: 90 (06/04/17 2042)  Resp: 18 (06/04/17 1901)  BP: (!) 175/116 (06/04/17 2042)  SpO2: 95 % (06/04/17 1901) Vital Signs (24h Range):  Temp:  [97.5 °F (36.4 °C)-99 °F (37.2 °C)] 99 °F (37.2 °C)  Pulse:  [76-93] 90  Resp:  [18] 18  SpO2:  [93 %-95 %] 95 %  BP: (125-175)/() 175/116     Weight: 79.4 kg (175 lb 0.7 oz)  Body mass index is 25.85 kg/m².    Intake/Output Summary (Last 24 hours) at 06/04/17 2139  Last data filed at 06/04/17 2042   Gross per 24 hour   Intake             2950 ml   Output             2200 ml   Net              750 ml      Physical Exam   Constitutional: He is oriented to person, place, and time. He appears well-developed and well-nourished. No distress.   HENT:   Head: Normocephalic and atraumatic.   Eyes: Conjunctivae and EOM are normal. Pupils are equal, round, and reactive to light. No scleral icterus.   Neck: Normal range of motion. No JVD present.   Cardiovascular: Normal rate, regular rhythm and normal heart sounds.    No murmur heard.  Pulmonary/Chest: Effort normal and breath sounds normal. No respiratory distress.   Abdominal: Soft. Bowel sounds are normal. He exhibits no distension. There is tenderness. There is no rebound and no guarding.   Musculoskeletal: Normal range of motion. He exhibits no edema.    Neurological: He is alert and oriented to person, place, and time. No cranial nerve deficit.   Skin: Skin is warm and dry. Capillary refill takes less than 2 seconds. No rash noted.   Psychiatric: He has a normal mood and affect. His behavior is normal.   Nursing note and vitals reviewed.      Significant Labs: All pertinent labs within the past 24 hours have been reviewed.    Significant Imaging: I have reviewed all pertinent imaging results/findings within the past 24 hours.

## 2017-06-05 NOTE — ASSESSMENT & PLAN NOTE
Unclear etiology.  Improving.  Potentially hepatotoxic drugs on hold. Statin induced vs localized inflammation from cholecystitis (less likely clinically), viral.  Awaiting viral analysis, prior viral RNA quantification from April negative for HCV.  He did have his statin changed as an outpatient.

## 2017-06-05 NOTE — PLAN OF CARE
06/04/17 1920   Readmission Questionnaire   At the time of your discharge, did someone talk to you about what your health problems were? Yes   At the time of discharge, did someone talk to you about what to watch out for regarding worsening of your health problem? Yes   At the time of discharge, did someone talk to you about what to do if you experienced worsening of your health problem? Yes   At the time of discharge, did someone talk to you about which medication to take when you left the hospital and which ones to stop taking? Yes   At the time of discharge, did someone talk to you about when and where to follow up with a doctor after you left the hospital? Yes   How often do you need to have someone help you when you read instructions, pamphlets, or other written material from your doctor or pharmacy? Never   Do you have problems taking your medications as prescribed? Yes   Do you have any problems affording any of  your prescribed medications? Yes   Do you have problems obtaining/receiving your medications? Yes   Does the patient have transportation to healthcare appointments? Yes  (Pt pays for taxi )   Lives With alone   Living Arrangements mobile home   Does the patient have family/friends to help with healtcare needs after discharge? no   Who are your caregiver(s) and their phone number(s)? dtr:  Aurora Murray [phone]       Does your caregiver provide all the help you need? No   Are you currently feeling confused? No   Are you currently having problems thinking? No   Are you currently having memory problems? No   Have you felt down, depressed, or hopeless? 1   Have you felt little interest or pleasure in doing things? 0   In the last 7 days, my sleep quality was: fair

## 2017-06-05 NOTE — PROGRESS NOTES
[facility] Hospital Medicine  Progress Note    Patient Name: Benny Murray  MRN: [id number]  Patient Class: IP- Inpatient   Admission Date: 6/2/2017  Length of Stay: 2 days  Attending Physician: David Vargas MD  Primary Care Provider: Valentino Dickson MD        Subjective:     Principal Problem:Acute acalculous cholecystitis    HPI:  Mr. Murray presents for evaluation of RIGHT upper abdominal pain which has been present for a couple of days. He describes the pain as a dull sensation which is constant with periods of worsening. He reports some relief with Gas-X and standing. He denies radiation of the pain. He reports it is worse with pressing his RIGHT upper abdomen. He denies fever, chills. He denies vomiting, diarrhea, constipation. He reports feeling abdominal bloating. He denies trauma. He denies chest pain, shortness of breath. He reports poor appetite.     He was evaluated in the ED. He was found to have significant tenderness to palpation of his RIGHT upper abdomen. An abdominal ultrasound reveals findings suspicious of acalculous cholecystitis.    Hospital Course:  6/3 patient admitted on supportive care. Transaminitis of significant value. Gi and surgery consulted, abdominal ultrasound showing distended gallbladder but bile duct not significantly distended and gallbladder wall not thickened and no SIRS.  6/4 HIDA positive, but clinically not consistent with acalculous bear.  Transaminitis improved significantly.    Interval History: HIDA positive, still not c/w cholecystitis.  Transaminitis resolving.    Review of Systems   Constitutional: Negative for fatigue and fever.   Respiratory: Negative for cough, chest tightness and shortness of breath.    Cardiovascular: Negative for chest pain, palpitations and leg swelling.   Gastrointestinal: Positive for abdominal pain and nausea. Negative for abdominal distention, constipation, diarrhea and vomiting.   Genitourinary: Negative for difficulty  urinating and dysuria.   Skin: Negative for rash and wound.   Neurological: Negative for dizziness and numbness.     Objective:     Vital Signs (Most Recent):  Temp: 99 °F (37.2 °C) (06/04/17 1901)  Pulse: 90 (06/04/17 2042)  Resp: 18 (06/04/17 1901)  BP: (!) 175/116 (06/04/17 2042)  SpO2: 95 % (06/04/17 1901) Vital Signs (24h Range):  Temp:  [97.5 °F (36.4 °C)-99 °F (37.2 °C)] 99 °F (37.2 °C)  Pulse:  [76-93] 90  Resp:  [18] 18  SpO2:  [93 %-95 %] 95 %  BP: (125-175)/() 175/116     Weight: 79.4 kg (175 lb 0.7 oz)  Body mass index is 25.85 kg/m².    Intake/Output Summary (Last 24 hours) at 06/04/17 2139  Last data filed at 06/04/17 2042   Gross per 24 hour   Intake             2950 ml   Output             2200 ml   Net              750 ml      Physical Exam   Constitutional: He is oriented to person, place, and time. He appears well-developed and well-nourished. No distress.   HENT:   Head: Normocephalic and atraumatic.   Eyes: Conjunctivae and EOM are normal. Pupils are equal, round, and reactive to light. No scleral icterus.   Neck: Normal range of motion. No JVD present.   Cardiovascular: Normal rate, regular rhythm and normal heart sounds.    No murmur heard.  Pulmonary/Chest: Effort normal and breath sounds normal. No respiratory distress.   Abdominal: Soft. Bowel sounds are normal. He exhibits no distension. There is tenderness. There is no rebound and no guarding.   Musculoskeletal: Normal range of motion. He exhibits no edema.   Neurological: He is alert and oriented to person, place, and time. No cranial nerve deficit.   Skin: Skin is warm and dry. Capillary refill takes less than 2 seconds. No rash noted.   Psychiatric: He has a normal mood and affect. His behavior is normal.   Nursing note and vitals reviewed.      Significant Labs: All pertinent labs within the past 24 hours have been reviewed.    Significant Imaging: I have reviewed all pertinent imaging results/findings within the past 24  hours.    Assessment/Plan:      Acute hepatitis    Unclear etiology.  Improving.  Potentially hepatotoxic drugs on hold. Statin induced vs localized inflammation from cholecystitis (less likely clinically), viral.  Awaiting viral analysis, prior viral RNA quantification from April negative for HCV.  He did have his statin changed as an outpatient.          RUQ pain    Uncertain etiology.        Essential hypertension    Continue Clonidine, Lisinopril        Coronary artery disease involving native coronary artery of native heart without angina pectoris    Continue Aspirin, Atorvastatin  Monitor for signs and symptoms of ACS          Recurrent major depressive disorder    Continue Fluoxetine          Glaucoma suspect    Continue ophthalmics            VTE Risk Mitigation         Ordered     enoxaparin injection 40 mg  Daily     Route:  Subcutaneous        06/02/17 2310     Medium Risk of VTE  Once      06/02/17 2310          David Vargas MD  Department of Hospital Medicine   [facility]

## 2017-06-05 NOTE — PLAN OF CARE
SW met w/ pt for d/c assessment and per consult re:  Finances, mental health.  Pt states he lives alone, was transported here via ambulance.  Pt states his dtr Aurora is in hospital on [facility] today having a baby.   Pt states that he has a strained relationship with his dtr and son-in-law, he is not sure if dtr knows pt has been admitted to [facility].  Pt requested assistance w/ transport home @ d/c as he states he does not have any more money currently to pay for a taxi (his source of transportation).  Pt denies having any other family, friend, neighbor that he could call upon to assist.  Pt also stated that he has a [facility] financial application for assistance that he wants to turn into the financial dept.  SW advised him to turn it in tomorrow morning as there will be someone available to receive, process and assist him w/ it.  Pt states that he has also been informed that the [facility] is no longer available and that he has been having extreme difficulty w/ pain and pain meds for many years following an injury he previously sustained.       06/04/17 1922   Discharge Assessment   Assessment Type Discharge Planning Assessment   Confirmed/corrected address and phone number on facesheet? Yes   Assessment information obtained from? Patient   Type of Healthcare Directive Received (None)   Prior to hospitilization cognitive status: Alert/Oriented   Prior to hospitalization functional status: Independent   Current cognitive status: Alert/Oriented   Current Functional Status: Independent   Arrived From home or self-care   Lives With alone   Able to Return to Prior Arrangements yes   Is patient able to care for self after discharge? Yes   Does the patient have family/friends to help with healtcare needs after discharge? no   How many people do you have in your home that can help with your care after discharge? 0   Who are your caregiver(s) and their phone number(s)? dtr Aurora Shuklat [phone]   Patient's  perception of discharge disposition home or selfcare   Readmission Within The Last 30 Days current reason for admission unrelated to previous admission   Patient currently being followed by outpatient case management? No   Patient currently receives home health services? No   Does the patient currently use HME? No   Patient currently receives private duty nursing? No   Patient currently receives any other outside agency services? No   Equipment Currently Used at Home cane, straight   Do you have any problems affording any of your prescribed medications? Yes   Is the patient taking medications as prescribed? yes   Do you have any financial concerns preventing you from receiving the healthcare you need? Yes   Does the patient have transportation to healthcare appointments? Yes   Transportation Available taxi   On Dialysis? No   Does the patient receive services at the Coumadin Clinic? No   Are there any open cases? No   Discharge Plan A Home   Discharge Plan B Home   Patient/Family In Agreement With Plan yes

## 2017-06-05 NOTE — PROGRESS NOTES
[facility] Surgery  Progress Note    Subjective:     History of Present Illness:  65 y/o with RIGHT upper abdominal pain which has been present for a couple of days. He describes the pain as a sharp stabbing which is constant with periods of worsening. He reports some relief with Gas-X and standing. He denies radiation of the pain. He reports it is worse with pressing his RIGHT upper abdomen. He denies fever, chills. He denies vomiting, diarrhea, constipation. He reports feeling abdominal bloating. He denies trauma. He denies chest pain, shortness of breath. He reports poor appetite.        Post-Op Info:  * No surgery found *         Interval History: pain essentially resolved    Medications:  Continuous Infusions:   lactated ringers 100 mL/hr at 06/05/17 1021     Scheduled Meds:   aspirin  81 mg Oral Daily    brimonidine 0.15 % OPTH DROP  1 drop Both Eyes BID    buprenorphine-naloxone 2-0.5 mg  2 mg Sublingual Daily    enoxaparin  40 mg Subcutaneous Daily    fluoxetine  40 mg Oral Daily    labetalol  100 mg Oral BID    latanoprost  1 drop Right Eye QHS    lisinopril  5 mg Oral Daily    metronidazole  500 mg Oral Q8H    pantoprazole  40 mg Oral Daily    prasugrel  10 mg Oral Daily    quetiapine  50 mg Oral QHS    verapamil  240 mg Oral QHS     PRN Meds:dextrose 50%, dextrose 50%, glucagon (human recombinant), glucose, glucose, magnesium oxide, magnesium oxide, ondansetron, oxycodone, potassium chloride 10%, potassium chloride 10%, senna-docusate 8.6-50 mg, tizanidine, zolpidem     Review of patient's allergies indicates:  No Known Allergies  Objective:     Vital Signs (Most Recent):  Temp: 99.5 °F (37.5 °C) (06/05/17 1439)  Pulse: 101 (06/05/17 1100)  Resp: 20 (06/05/17 1100)  BP: (!) 143/82 (06/05/17 1100)  SpO2: (!) 92 % (06/05/17 1100) Vital Signs (24h Range):  Temp:  [98.7 °F (37.1 °C)-99.5 °F (37.5 °C)] 99.5 °F (37.5 °C)  Pulse:  [] 101  Resp:  [18-20] 20  SpO2:   [92 %-95 %] 92 %  BP: (140-175)/() 143/82     Weight: 79.4 kg (175 lb 0.7 oz)  Body mass index is 25.85 kg/m².    Intake/Output - Last 3 Shifts       06/03 0700 - 06/04 0659 06/04 0700 - 06/05 0659 06/05 0700 - 06/06 0659    P.O. 240 1350     I.V. (mL/kg) 1400 (17.6) 1400 (17.6)     IV Piggyback       Total Intake(mL/kg) 1640 (20.7) 2750 (34.6)     Urine (mL/kg/hr) 2000 (1) 2850 (1.5)     Stool 0 (0) 0 (0)     Total Output 2000 2850      Net -360 -100             Stool Occurrence 0 x 0 x           Physical Exam   Constitutional: He is oriented to person, place, and time. He appears well-nourished. No distress.   HENT:   Head: Atraumatic.   Eyes: Conjunctivae and EOM are normal. Pupils are equal, round, and reactive to light. No scleral icterus.   Neck: Normal range of motion. Neck supple. No JVD present. No tracheal deviation present. No thyromegaly present.   Cardiovascular: Normal rate, regular rhythm and normal heart sounds.  Exam reveals no gallop and no friction rub.    No murmur heard.  Pulmonary/Chest: Effort normal and breath sounds normal. No respiratory distress. He has no wheezes. He has no rales. He exhibits no tenderness.   Abdominal: Soft. Bowel sounds are normal. He exhibits no distension and no mass. There is no tenderness. There is no rebound and no guarding.   Musculoskeletal: Normal range of motion. He exhibits no edema or tenderness.   Neurological: He is alert and oriented to person, place, and time. No cranial nerve deficit.   Skin: Skin is warm and dry. He is not diaphoretic.   Psychiatric: He has a normal mood and affect.   Nursing note and vitals reviewed.      Significant Labs:  CBC:   Recent Labs  Lab 06/05/17 0605   WBC 6.90   RBC 4.13*   HGB 12.1*   HCT 35.8*      MCV 87   MCH 29.4   MCHC 34.0     CMP:   Recent Labs  Lab 06/05/17 0605   GLU 99   CALCIUM 9.4   ALBUMIN 3.2*   PROT 7.3      K 3.8   CO2 24      BUN 8   CREATININE 0.8   ALKPHOS 414*   *   AST  262*   BILITOT 0.5       Significant Diagnostics:  none    Assessment/Plan:     Acute hepatitis    Pain resolved  LFT's improving  I do not think this is acute cholecystitis  Continue management as per medical team  Will sign off  Diet as tolerated        RUQ pain    I do not suspect acute acalculous cholecystitis despite hida scan, suspect false positive  Clinical picture most consistent with hepatitis    Symptoms resolved          Justin Fluton MD  General Surgery  [facility]

## 2017-06-05 NOTE — ASSESSMENT & PLAN NOTE
Pain resolved  LFT's improving  I do not think this is acute cholecystitis  Continue management as per medical team  Will sign off  Diet as tolerated

## 2017-06-05 NOTE — SUBJECTIVE & OBJECTIVE
Interval History: pain essentially resolved    Medications:  Continuous Infusions:   lactated ringers 100 mL/hr at 06/05/17 1021     Scheduled Meds:   aspirin  81 mg Oral Daily    brimonidine 0.15 % OPTH DROP  1 drop Both Eyes BID    buprenorphine-naloxone 2-0.5 mg  2 mg Sublingual Daily    enoxaparin  40 mg Subcutaneous Daily    fluoxetine  40 mg Oral Daily    labetalol  100 mg Oral BID    latanoprost  1 drop Right Eye QHS    lisinopril  5 mg Oral Daily    metronidazole  500 mg Oral Q8H    pantoprazole  40 mg Oral Daily    prasugrel  10 mg Oral Daily    quetiapine  50 mg Oral QHS    verapamil  240 mg Oral QHS     PRN Meds:dextrose 50%, dextrose 50%, glucagon (human recombinant), glucose, glucose, magnesium oxide, magnesium oxide, ondansetron, oxycodone, potassium chloride 10%, potassium chloride 10%, senna-docusate 8.6-50 mg, tizanidine, zolpidem     Review of patient's allergies indicates:  No Known Allergies  Objective:     Vital Signs (Most Recent):  Temp: 99.5 °F (37.5 °C) (06/05/17 1439)  Pulse: 101 (06/05/17 1100)  Resp: 20 (06/05/17 1100)  BP: (!) 143/82 (06/05/17 1100)  SpO2: (!) 92 % (06/05/17 1100) Vital Signs (24h Range):  Temp:  [98.7 °F (37.1 °C)-99.5 °F (37.5 °C)] 99.5 °F (37.5 °C)  Pulse:  [] 101  Resp:  [18-20] 20  SpO2:  [92 %-95 %] 92 %  BP: (140-175)/() 143/82     Weight: 79.4 kg (175 lb 0.7 oz)  Body mass index is 25.85 kg/m².    Intake/Output - Last 3 Shifts       06/03 0700 - 06/04 0659 06/04 0700 - 06/05 0659 06/05 0700 - 06/06 0659    P.O. 240 1350     I.V. (mL/kg) 1400 (17.6) 1400 (17.6)     IV Piggyback       Total Intake(mL/kg) 1640 (20.7) 2750 (34.6)     Urine (mL/kg/hr) 2000 (1) 2850 (1.5)     Stool 0 (0) 0 (0)     Total Output 2000 2850      Net -360 -100             Stool Occurrence 0 x 0 x           Physical Exam   Constitutional: He is oriented to person, place, and time. He appears well-nourished. No distress.   HENT:   Head: Atraumatic.   Eyes:  Conjunctivae and EOM are normal. Pupils are equal, round, and reactive to light. No scleral icterus.   Neck: Normal range of motion. Neck supple. No JVD present. No tracheal deviation present. No thyromegaly present.   Cardiovascular: Normal rate, regular rhythm and normal heart sounds.  Exam reveals no gallop and no friction rub.    No murmur heard.  Pulmonary/Chest: Effort normal and breath sounds normal. No respiratory distress. He has no wheezes. He has no rales. He exhibits no tenderness.   Abdominal: Soft. Bowel sounds are normal. He exhibits no distension and no mass. There is no tenderness. There is no rebound and no guarding.   Musculoskeletal: Normal range of motion. He exhibits no edema or tenderness.   Neurological: He is alert and oriented to person, place, and time. No cranial nerve deficit.   Skin: Skin is warm and dry. He is not diaphoretic.   Psychiatric: He has a normal mood and affect.   Nursing note and vitals reviewed.      Significant Labs:  CBC:   Recent Labs  Lab 06/05/17  0605   WBC 6.90   RBC 4.13*   HGB 12.1*   HCT 35.8*      MCV 87   MCH 29.4   MCHC 34.0     CMP:   Recent Labs  Lab 06/05/17  0605   GLU 99   CALCIUM 9.4   ALBUMIN 3.2*   PROT 7.3      K 3.8   CO2 24      BUN 8   CREATININE 0.8   ALKPHOS 414*   *   *   BILITOT 0.5       Significant Diagnostics:  none

## 2017-06-05 NOTE — PLAN OF CARE
06/05/17 1614   Final Note   Assessment Type Final Discharge Note   Discharge Disposition Home   Discharge planning education complete? Yes   Hospital Follow Up  Appt(s) scheduled? Yes

## 2017-06-06 LAB
EBV VCA IGG SER QL IA: POSITIVE
EBV VCA IGM SER QL IA: NEGATIVE
HIV UQ DATE RECEIVED: NORMAL
HIV UQ DATE REPORTED: NORMAL
HIV1 RNA # SERPL NAA+PROBE: <40 COPIES/ML
HIV1 RNA SERPL NAA+PROBE-LOG#: <1.6 LOG (10) COPIES/ML
HIV1 RNA SERPL QL NAA+PROBE: NOT DETECTED
SMOOTH MUSCLE AB TITR SER IF: ABNORMAL {TITER}

## 2017-06-07 ENCOUNTER — PATIENT OUTREACH (OUTPATIENT)
Dept: ADMINISTRATIVE | Facility: CLINIC | Age: 67
End: 2017-06-07
Payer: MEDICARE

## 2017-06-07 NOTE — ASSESSMENT & PLAN NOTE
Unclear etiology.  Improving.  Potentially hepatotoxic drugs on hold. Statin induced vs localized inflammation from cholecystitis (less likely clinically), viral.  Awaiting viral analysis, prior viral RNA quantification from April negative for HCV.  He did have his statin changed as an outpatient, hold on discharge until seen by Dr. Lamb.  Serology for AIH also obtained and is pending.

## 2017-06-07 NOTE — DISCHARGE SUMMARY
[facility] Hospital Medicine  Discharge Summary      Patient Name: Benny Murray  MRN: [id number]  Admission Date: 6/2/2017  Hospital Length of Stay: 3 days  Discharge Date and Time: 6/5/2017  6:17 PM  Attending Physician: No att. providers found   Discharging Provider: David Vargas MD  Primary Care Provider: Valentino Dickson MD      HPI:   Mr. Murray presents for evaluation of RIGHT upper abdominal pain which has been present for a couple of days. He describes the pain as a dull sensation which is constant with periods of worsening. He reports some relief with Gas-X and standing. He denies radiation of the pain. He reports it is worse with pressing his RIGHT upper abdomen. He denies fever, chills. He denies vomiting, diarrhea, constipation. He reports feeling abdominal bloating. He denies trauma. He denies chest pain, shortness of breath. He reports poor appetite.     He was evaluated in the ED. He was found to have significant tenderness to palpation of his RIGHT upper abdomen. An abdominal ultrasound reveals findings suspicious of acalculous cholecystitis.    * No surgery found *      Indwelling Lines/Drains at time of discharge:   Lines/Drains/Airways          No matching active lines, drains, or airways        Hospital Course:   6/3 patient admitted on supportive care. Transaminitis of significant value. Gi and surgery consulted, abdominal ultrasound showing distended gallbladder but bile duct not significantly distended and gallbladder wall not thickened and no SIRS.  6/4 HIDA positive, but clinically not consistent with acalculous bear.  Transaminitis improved significantly. Laboratory serology obtained for etiology of underlying transaminitis.  6/5 Transaminitis continued to resolve, pain resolved and tolerating diet.  Patient discharged home and statin held given possibility of medication induced hepatitis and follow up to be arranged with Dr. Lamb.     Consults:   Consults         Status  Ordering Provider     Inpatient consult to Gastroenterology  Once     Provider:  Brice Hodgson MD    Completed NAHED MARLEY     Inpatient consult to General Surgery  Once     Provider:  Justin Fulton MD    Completed BECCA SUN          Significant Diagnostic Studies: Labs: All labs within the past 24 hours have been reviewed  Microbiology: Blood Culture No results found for: LABBLOO, Sputum Culture No results found for: GSRESP, RESPIRATORYC and Urine Culture  No results found for: LABURIN  Radiology:   NM Hepatobiliary Imaging HIDA [[id number]] Resulted: 06/04/17 1019   Order Status: Completed Updated: 06/04/17 1019   Addenda:       Addendum: Dr. Fulton requested a 24-hour followup image, and as such,   anterior and right lateral images of the abdomen were acquired.  There is   no residual radiotracer activity noted within the liver, biliary system,   or in the expected vicinity of the gallbladder.  All residual activity is   situated within the bowel.   Please note that the lack of radiotracer accumulation within the   gallbladder is not necessarily pathognomonic for acute cholecystitis.  A   diagnosis of acute cholecystitis requires an appropriate clinical   suspicion.  In speaking with Dr. Fulton, the patient has markedly   elevated liver function tests and no fever or elevated white blood cell   count, and there is a concern for possible hepatitis.  Please note that   poor hepatic function and fasting state can produce false positive HIDA   scans.       Electronically signed by: Katelyn Parisi MD Date: 06/04/17 Time: 10:19   Signed: 06/04/17 1019 by Katelyn Parisi MD   Narrative:     Comparison: Gallbladder ultrasound 6/2/2017    Technique: Following the intravenous administration of 5.3 mCi of technetium 99m Choletec, static anterior and lateral images of the upper abdomen were obtained over 4 hours.    Findings:    There is rapid clearance of the blood pool.  There is slightly delayed  radiotracer accumulation by the liver.  There is diffuse homogeneous radiotracer accumulation throughout the liver.  The intra-and extrahepatic bile ducts are visible at 7 minutes.  There is excretion of radiotracer into the small bowel at 15 minutes.  There is no definite radiotracer excretion into the gallbladder at 4 hours, and when combined with the patient's recent ultrasound findings, these findings suggest possible acalculous cholecystitis.   Impression:      Imaging findings which suggest possible acute acalculous cholecystitis.  Please correlate clinically.        Electronically signed by: Katelyn Parisi MD  Date: 06/03/17  Time: 15:13    US Abdomen Limited [[id number]] Resulted: 06/03/17 0900   Order Status: Completed Updated: 06/03/17 0901   Narrative:     Comparison: The abdominal ultrasound-2/16/2017    Technique: Limited ultrasound evaluation of the abdomen was performed utilizing grayscale, color flow, and Doppler imaging.    Findings:    The pancreas is obscured by bowel gas.  The IVC is obscured by bowel gas.  The left hepatic lobe it is not optimally evaluated due to technical difficulty.    The gallbladder is markedly distended measuring at least 15.3 cm in sagittal dimension.  No abnormal gallbladder wall thickening.  No definite gallstones identified.  The technologist reports a positive sonographic Hoang's sign.  No pericholecystic fluid.  No intra-or extrahepatic biliary dilatation, and the common bile duct measures 5 mm.    The liver measures approximately 15.3 cm in sagittal dimension and shows a normal homogeneous parenchymal echotexture where well visualized.  Again, the left hepatic lobe was not optimally evaluated on the provided images.  The portal vein shows normal directional flow.  The right kidney is unremarkable.   Impression:      Pronounced gallbladder distention and positive sonographic Hoang sign per technologist exam.  No shadowing gallstones appreciated.  If there is a  clinical concern of acute cholecystitis, consideration could be given to performing confirmation with a HIDA scan if acute acalculous cholecystitis is suspected clinically.        Electronically signed by: Katelyn Parisi MD  Date: 06/03/17  Time: 09:00        Cardiac Graphics: Echocardiogram: 2D echo with color flow doppler: No results found for this or any previous visit.    Pending Diagnostic Studies:     Procedure Component Value Units Date/Time    EKG 12-lead [[id number]]     Order Status:  Sent Lab Status:  No result         Final Active Diagnoses:    Diagnosis Date Noted POA    RUQ pain [R10.11] 06/03/2017 Yes    Acute hepatitis [B17.9] 06/03/2017 Yes    Essential hypertension [I10] 06/02/2017 Yes    Coronary artery disease involving native coronary artery of native heart without angina pectoris [I25.10] 05/23/2017 Yes    Recurrent major depressive disorder [F33.9] 03/08/2013 Yes    Glaucoma suspect [H40.009] 06/08/2012 Yes      Problems Resolved During this Admission:    Diagnosis Date Noted Date Resolved POA    PRINCIPAL PROBLEM:  Acute acalculous cholecystitis [K81.0] 06/02/2017 06/03/2017 Yes      Acute hepatitis    Unclear etiology.  Improving.  Potentially hepatotoxic drugs on hold. Statin induced vs localized inflammation from cholecystitis (less likely clinically), viral.  Awaiting viral analysis, prior viral RNA quantification from April negative for HCV.  He did have his statin changed as an outpatient, hold on discharge until seen by Dr. Lamb.  Serology for AIH also obtained and is pending.          RUQ pain    Possibly due to acute hepatitis, resolved presently        Essential hypertension    Continue Clonidine, Lisinopril        Coronary artery disease involving native coronary artery of native heart without angina pectoris    Continue Aspirin, Atorvastatin held  Monitor for signs and symptoms of ACS          Recurrent major depressive disorder    Continue Fluoxetine          Glaucoma  "suspect    Continue ophthalmics              Discharged Condition: good    Disposition: Home or Self Care    Follow Up:  Follow-up Information     Valentino Dickson MD On 6/21/2017.    Specialties:  Family Medicine, Internal Medicine  Why:  Hospital f/u on  Wednesday, 6/21/17 @ 8:20am  Contact information:  [id number]  [facility] [address]  [phone]             Amira Lamb MD In 1 week.    Specialties:  Gastroenterology, Transplant, Hepatology  Why:  Hospital f/.  Cm spoke with Krista, she will send message to nurse to schedule and notify pt of appointment  Contact information:  [address]  [facility] [address]  [phone]                 Patient Instructions:     Diet general       Medications:  Reconciled Home Medications:   Discharge Medication List as of 6/5/2017  3:43 PM      START taking these medications    Details   oxycodone (ROXICODONE) 5 MG immediate release tablet Take 1 tablet (5 mg total) by mouth every 6 (six) hours as needed., Starting Mon 6/5/2017, Print         CONTINUE these medications which have CHANGED    Details   atorvastatin (LIPITOR) 40 MG tablet Take 1 tablet (40 mg total) by mouth once daily. Hold until you follow up with Dr. Lamb, Starting Mon 6/5/2017, No Print         CONTINUE these medications which have NOT CHANGED    Details   aspirin (ECOTRIN) 81 MG EC tablet Take 81 mg by mouth once daily. , Until Discontinued, Historical Med      BD LUER-RADHA SYRINGE 3 mL 23 x 1 1/2" Syrg Starting 5/8/2013, Until Discontinued, Historical Med      brimonidine 0.15 % OPTH DROP (ALPHAGAN) 0.15 % ophthalmic solution INSTILL 1 DROP INTO EACH EYE TWICE A DAY, Normal      buprenorphine HCl (SUBUTEX) 2 mg Subl Place 2 mg under the tongue once daily. , Historical Med      cloNIDine (CATAPRES) 0.1 MG tablet Take 1 tablet (0.1 mg total) by mouth 3 (three) times daily., Starting Sat 5/27/2017, Until Sun 5/27/2018, Print      EFFIENT 10 mg Tab Take 1 tablet by mouth once daily., Starting " 4/28/2017, Until Discontinued, Historical Med      fluoxetine (PROZAC) 40 MG capsule Take 1 capsule (40 mg total) by mouth once daily., Starting 4/25/2017, Until Discontinued, Normal      labetalol (NORMODYNE) 100 MG tablet TAKE ONE TABLET BY MOUTH TWICE DAILY, Normal      latanoprost 0.005 % ophthalmic solution PLACE 1 DROP INTO THE RIGHT EYE EVERY EVENING, Normal      lisinopril (PRINIVIL,ZESTRIL) 5 MG tablet Take 1 tablet by mouth once daily., Starting 4/28/2017, Until Discontinued, Historical Med      MELATONIN ORAL Take 10 mg by mouth nightly., Historical Med      nitroGLYCERIN (NITROSTAT) 0.4 MG SL tablet Place 0.4 mg under the tongue every 5 (five) minutes as needed for Chest pain., Historical Med      pantoprazole (PROTONIX) 40 MG tablet Take 1 tablet by mouth once daily., Starting 4/28/2017, Until Discontinued, Historical Med      polyethylene glycol (GLYCOLAX) 17 gram/dose powder TAKE 17 GRAMS MIXED IN LIQUID ONCE DAILY, Normal      quetiapine (SEROQUEL) 50 MG tablet Take 1 tablet (50 mg total) by mouth every evening., Starting Sat 5/27/2017, Until Sun 5/27/2018, Print      saw palmetto 500 MG capsule Take 500 mg by mouth 2 (two) times daily., Historical Med      TIZANIDINE HCL (ZANAFLEX ORAL) Take 2 mg by mouth 3 (three) times daily as needed. , Historical Med      verapamil (CALAN-SR) 240 MG CR tablet TAKE 1 TABLET BY MOUTH ONCE q hs, Normal      zolpidem (AMBIEN) 5 MG Tab Starting 8/28/2015, Until Discontinued, Historical Med           Time spent on the discharge of patient: 33 minutes    David Vargas MD  Department of Hospital Medicine  [facility]

## 2017-06-08 LAB
HCV LOG: <1.08 LOG (10) IU/ML
HCV RNA QUANT PCR: <12 IU/ML
HCV, QUALITATIVE: NOT DETECTED IU/ML

## 2017-06-09 ENCOUNTER — DOCUMENTATION ONLY (OUTPATIENT)
Dept: FAMILY MEDICINE | Facility: CLINIC | Age: 67
End: 2017-06-09

## 2017-06-09 DIAGNOSIS — I25.10 CAD IN NATIVE ARTERY: Primary | ICD-10-CM

## 2017-06-10 ENCOUNTER — OUTPATIENT CASE MANAGEMENT (OUTPATIENT)
Dept: ADMINISTRATIVE | Facility: OTHER | Age: 67
End: 2017-06-10

## 2017-06-10 NOTE — PROGRESS NOTES
Thank you for the referral.   For your information:    The following patient has been assigned to Issa Soriano RN with Outpatient [facility] for high risk screening.    Reason: CAD in native artery    Please contact [facility] at [phone] with any questions.    Thank you,  Aurora Krishnamurthy, SSC

## 2017-06-13 ENCOUNTER — TELEPHONE (OUTPATIENT)
Dept: HEPATOLOGY | Facility: CLINIC | Age: 67
End: 2017-06-13

## 2017-06-13 NOTE — TELEPHONE ENCOUNTER
----- Message from Angela Cee sent at 6/13/2017  3:42 PM CDT -----  Contact: pt  Wants to speak with Ashley about labs tomorrow said he had same ones in hospital and has another question needs to speak to her today please call him @ # [phone].

## 2017-06-13 NOTE — TELEPHONE ENCOUNTER
Returned call to pt.   Pt was recently admitted on 6/2 for abdominal pain, elevated liver enzymes, suspected cholecystitis. Liver enzymes improved upon discharge, but remain elevated.   HCV RNA done while inpatient. Hep C remains not detected.    Pt states he was weaning from Fentanyl patches a few days prior to ER visit. He is questioning if patches could have caused liver damage.    Informed pt message will be sent to provider.

## 2017-06-14 ENCOUNTER — OFFICE VISIT (OUTPATIENT)
Dept: FAMILY MEDICINE | Facility: CLINIC | Age: 67
End: 2017-06-14
Payer: MEDICARE

## 2017-06-14 ENCOUNTER — APPOINTMENT (OUTPATIENT)
Dept: LAB | Facility: HOSPITAL | Age: 67
End: 2017-06-14
Attending: NURSE PRACTITIONER
Payer: MEDICARE

## 2017-06-14 VITALS
HEART RATE: 66 BPM | BODY MASS INDEX: 27.26 KG/M2 | OXYGEN SATURATION: 95 % | DIASTOLIC BLOOD PRESSURE: 68 MMHG | HEIGHT: 69 IN | SYSTOLIC BLOOD PRESSURE: 97 MMHG | TEMPERATURE: 98 F | WEIGHT: 184.06 LBS

## 2017-06-14 DIAGNOSIS — F51.01 PRIMARY INSOMNIA: Primary | ICD-10-CM

## 2017-06-14 DIAGNOSIS — Z12.5 SCREENING PSA (PROSTATE SPECIFIC ANTIGEN): ICD-10-CM

## 2017-06-14 DIAGNOSIS — D50.9 IRON DEFICIENCY ANEMIA, UNSPECIFIED IRON DEFICIENCY ANEMIA TYPE: ICD-10-CM

## 2017-06-14 DIAGNOSIS — R35.1 NOCTURIA: ICD-10-CM

## 2017-06-14 DIAGNOSIS — E29.1 HYPOGONADISM IN MALE: ICD-10-CM

## 2017-06-14 DIAGNOSIS — R53.83 FATIGUE, UNSPECIFIED TYPE: ICD-10-CM

## 2017-06-14 DIAGNOSIS — E53.8 B12 DEFICIENCY: ICD-10-CM

## 2017-06-14 LAB
COMPLEXED PSA SERPL-MCNC: <0.01 NG/ML
FERRITIN SERPL-MCNC: 177 NG/ML
IRON SERPL-MCNC: 57 UG/DL
SATURATED IRON: 16 %
TOTAL IRON BINDING CAPACITY: 351 UG/DL
TRANSFERRIN SERPL-MCNC: 237 MG/DL
TSH SERPL DL<=0.005 MIU/L-ACNC: 1.04 UIU/ML
VIT B12 SERPL-MCNC: 1512 PG/ML

## 2017-06-14 PROCEDURE — 82728 ASSAY OF FERRITIN: CPT

## 2017-06-14 PROCEDURE — 82607 VITAMIN B-12: CPT

## 2017-06-14 PROCEDURE — 84153 ASSAY OF PSA TOTAL: CPT

## 2017-06-14 PROCEDURE — 84270 ASSAY OF SEX HORMONE GLOBUL: CPT

## 2017-06-14 PROCEDURE — 84443 ASSAY THYROID STIM HORMONE: CPT

## 2017-06-14 PROCEDURE — 83540 ASSAY OF IRON: CPT

## 2017-06-14 RX ORDER — TRAZODONE HYDROCHLORIDE 50 MG/1
TABLET ORAL
Qty: 90 TABLET | Refills: 11 | Status: ON HOLD | OUTPATIENT
Start: 2017-06-14 | End: 2017-07-04 | Stop reason: HOSPADM

## 2017-06-14 NOTE — PATIENT INSTRUCTIONS
Established High Blood Pressure    High blood pressure (hypertension) is a chronic disease. Often health care providers dont know what causes it. But it can be caused by certain health conditions and medicines.  If you have high blood pressure, you may not have any symptoms. If you do have symptoms, they may include headache, dizziness, changes in your vision, chest pain, and shortness of breath. But even without symptoms, high blood pressure thats not treated raises your risk for heart attack and stroke. High blood pressure is a serious health risk and shouldnt be ignored.  A blood pressure reading is made up of two numbers: a higher number over a lower number. The top number is the systolic pressure. The bottom number is the diastolic pressure. A normal blood pressure is less than 120 over less than 80.  High blood pressure is when either the top number is 140 or higher, or the bottom number is 90 or higher. This must be the result when taking your blood pressure a number of times. The blood pressures between normal and high are called prehypertension.  Home care  If you have high blood pressure, you should do what is listed below to lower your blood pressure. If you are taking medicines for high blood pressure, these methods may reduce or end your need for medicines in the future.  · Begin a weight-loss program if you are overweight.  · Cut back on how much salt you get in your diet. Heres how to do this:  ¨ Dont eat foods that have a lot of salt. These include olives, pickles, smoked meats, and salted potato chips.  ¨ Dont add salt to your food at the table.  ¨ Use only small amounts of salt when cooking.  · Begin an exercise program. Talk with your health care provider about the type of exercise program that would be best for you. It doesn't have to be hard. Even brisk walking for 20 minutes 3 times a week is a good form of exercise.  · Dont take medicines that have heart stimulants. This includes many  cold and sinus decongestant pills and sprays, as well as diet pills. Check the warnings about hypertension on the label. Stimulants such as amphetamine or cocaine could be lethal for someone with high blood pressure. Never take these.  · Limit how much caffeine you get in your diet. Switch to caffeine-free products.  · Stop smoking. If you are a long-time smoker, this can be hard. Enroll in a stop-smoking program to make it more likely that you will quit for good.  · Learn how to handle stress. This is an important part of any program to lower blood pressure. Learn about relaxation methods like meditation, yoga, or biofeedback.  · If your provider prescribed medicines, take them exactly as directed. Missing doses may cause your blood pressure get out of control.  · Consider buying an automatic blood pressure machine. You can get one of these at most pharmacies. Use this to watch your blood pressure at home. Give the results to your provider.  Follow-up care  You will need to make regular visits to your health care provider. This is to check your blood pressure and to make changes to your medicines. Make a follow-up appointment as directed.  When to seek medical advice  Call your health care provider right away if any of these occur:  · Chest pain or shortness of breath  · Severe headache  · Throbbing or rushing sound in the ears  · Nosebleed  · Sudden severe pain in your belly (abdomen)  · Extreme drowsiness, confusion, or fainting  · Dizziness or dizziness with a spinning sensation (vertigo)  · Weakness of an arm or leg or one side of the face  · You have problems speaking or seeing   Date Last Reviewed: 11/25/2014  © 5826-2016 [employer] [address] [place] [address], [place] [address]. All rights reserved. This information is not intended as a substitute for professional medical care. Always follow your healthcare professional's instructions.

## 2017-06-14 NOTE — MEDICAL/APP STUDENT
"Subjective:       Patient ID: Benny Murray is a 66 y.o. male.    Chief Complaint:Hypotension, Fatigue, Hospitalized [facility] 3[facility]6 for Abdominal pain/liver failure Follow-up    Complaints of fatigue, fustration over forced detox after denied narcotics from [facility] orthopedics for chronic back and right hip pain, Onset: three weeks, Duration: constant fatigue, "takes a lot of will power to stand up"; Aggravating factors: lack of sleep, 4 hours sleep per night, sleep disturbance every night constantly waking up, having to urinate hourly during the night, Pt considers symptoms severe      Fatigue   This is a new problem. The current episode started 1 to 4 weeks ago. The problem occurs constantly. The problem has been gradually improving. Associated symptoms include fatigue and myalgias. Pertinent negatives include no abdominal pain, arthralgias, change in bowel habit, chest pain, chills, congestion, coughing, diaphoresis, fever, headaches or nausea. The symptoms are aggravated by standing, walking and exertion. He has tried rest and immobilization for the symptoms. The treatment provided mild relief.   Anemia   Symptoms include pallor. There has been no abdominal pain, confusion, fever or palpitations. Past medical history includes recent illness. There is no history of chronic renal disease.     Review of Systems   Constitutional: Positive for fatigue. Negative for chills, diaphoresis and fever.   HENT: Negative for congestion.    Eyes: Negative for pain, discharge and itching.   Respiratory: Negative for apnea, cough, chest tightness and shortness of breath.    Cardiovascular: Negative for chest pain and palpitations.   Gastrointestinal: Negative for abdominal pain, change in bowel habit and nausea.   Endocrine: Negative.    Genitourinary: Positive for frequency. Negative for difficulty urinating and urgency.   Musculoskeletal: Positive for myalgias. Negative for arthralgias.   Skin: Positive for pallor.        " Ecchymosis bilateral arms, pt states from hospital bed thrashing during detox   Neurological: Negative for headaches.   Psychiatric/Behavioral: Positive for sleep disturbance. Negative for agitation, behavioral problems, confusion, decreased concentration, dysphoric mood, hallucinations, self-injury and suicidal ideas. The patient is not nervous/anxious and is not hyperactive.      States current medications:pt wants to stop Seroquel complaints of dry mouth, does not feel Seroquel is helping, Want refill on Ambien feels Melatonin is not helping with sleep,   Pt states checks blood pressure daily average 115/85. Low for last two days.  Objective:      Physical Exam   Constitutional: He is oriented to person, place, and time. He appears well-developed and well-nourished.   HENT:   Head: Normocephalic.   Eyes: Pupils are equal, round, and reactive to light.   Neck: Normal range of motion. Neck supple. No thyromegaly present.   Cardiovascular: Normal rate, normal heart sounds and intact distal pulses.    Pulmonary/Chest: Effort normal and breath sounds normal.   Abdominal: Soft. Bowel sounds are normal.   Musculoskeletal: Normal range of motion.   Neurological: He is alert and oriented to person, place, and time. He has normal reflexes.   Skin: Skin is warm and dry. Capillary refill takes less than 2 seconds. There is pallor.   Psychiatric: His speech is normal. Judgment and thought content normal. His affect is blunt. He is agitated. Cognition and memory are normal. He expresses no suicidal ideation. He expresses no suicidal plans.       Assessment:       No diagnosis found.    Plan:       Adjust Blood pressure medications  Stop seroquel due to adverse effects of dry mouth affecting increased frequency.   Potassium supplement of low K+ level  Trazodone for sleep disturbance

## 2017-06-14 NOTE — PROGRESS NOTES
"Medical/NARDA Student  Encounter Date: 6/14/2017  Sharmin Kevin        Subjective:       Patient ID: Benny Murray is a 66 y.o. male.     Chief Complaint:Hypotension, Fatigue, Hospitalized [facility] 3-6 for Abdominal pain/liver failure, cholecystitis.    Transitional Care Note    Family and/or Caretaker present at visit?  No.  Diagnostic tests reviewed/disposition: I have reviewed all completed as well as pending diagnostic tests at the time of discharge.  Disease/illness education: Discussed fatigue and it's relationship to anemia, why we don't automatically order testosterone for fatigue and weight gain. Explained why ambien is contraindicated for this patient due to age and frailty (per [facility] list)  Home health/community services discussion/referrals: Patient does not have home health established from hospital visit.  They do not need home health.  If needed, we will set up home health for the patient.   Establishment or re-establishment of referral orders for community resources: No other necessary community resources.   Discussion with other health care providers: No discussion with other health care providers necessary.       Complaints of fatigue, fustration over forced detox after denied narcotics from [facility] orthopedics for chronic back and right hip pain, Onset: three weeks, Duration: constant fatigue, "takes a lot of will power to stand up"; Aggravating factors: lack of sleep, 4 hours sleep per night, sleep disturbance every night constantly waking up and wants Ambien, having to urinate hourly during the night, Pt considers symptoms severe     Fatigue   This is a new problem. The current episode started 1 to 4 weeks ago. The problem occurs constantly. The problem has been gradually improving. Associated symptoms include fatigue and myalgias. Pertinent negatives include no abdominal pain, arthralgias, change in bowel habit, chest pain, chills, congestion, coughing, diaphoresis, fever, headaches or nausea. The " symptoms are aggravated by standing, walking and exertion. He has tried rest and immobilization for the symptoms. The treatment provided mild relief.   Anemia   Symptoms include pallor. There has been no abdominal pain, confusion, fever or palpitations. Past medical history includes recent illness. There is no history of chronic renal disease.      Patient stopped seroquel due to adverse effects of dry mouth affecting increased urinary frequency. Does not think he needs it.     He believes his fatigue is due to low testosterone, states he has never had abdominal fat in the past and wants testosterone ordered. States he was on it several years ago, but Dr. Dickson stopped it. Does not exercise due to back pain.     Review of Systems   Constitutional: Positive for fatigue. Negative for chills, diaphoresis and fever.   HENT: Negative for congestion.    Eyes: Negative for pain, discharge and itching.   Respiratory: Negative for apnea, cough, chest tightness and shortness of breath.    Cardiovascular: Negative for chest pain and palpitations.   Gastrointestinal: Negative for abdominal pain, change in bowel habit and nausea.   Endocrine: Negative.    Genitourinary: Positive for frequency. Negative for difficulty urinating and urgency.   Musculoskeletal: Positive for myalgias. Negative for arthralgias.   Skin: Positive for pallor.        Ecchymosis bilateral arms, pt states from hospital bed thrashing during detox   Neurological: Negative for headaches.   Psychiatric/Behavioral: Positive for sleep disturbance. Negative for agitation, behavioral problems, confusion, decreased concentration, dysphoric mood, hallucinations, self-injury and suicidal ideas. The patient is not nervous/anxious and is not hyperactive.      States current medications:pt wants to stop Seroquel complaints of dry mouth, does not feel Seroquel is helping, Want refill on Ambien feels Melatonin is not helping with sleep,   Pt states checks blood pressure  daily average 115/85. Low for last two days.  Objective:      Physical Exam   Constitutional: He is oriented to person, place, and time. He appears well-developed and well-nourished.   HENT:   Head: Normocephalic.   Eyes: Pupils are equal, round, and reactive to light.   Neck: Normal range of motion. Neck supple. No thyromegaly present.   Cardiovascular: Normal rate, normal heart sounds and intact distal pulses.    Pulmonary/Chest: Effort normal and breath sounds normal.   Abdominal: Soft. Bowel sounds are normal.   Musculoskeletal: Normal range of motion.   Neurological: He is alert and oriented to person, place, and time. He has normal reflexes.   Skin: Skin is warm and dry. Capillary refill takes less than 2 seconds. There is pallor.   Psychiatric: His speech is normal. Judgment and thought content normal. His affect is blunt. He is agitated. Cognition and memory are normal. He expresses no suicidal ideation. He expresses no suicidal plans.       Assessment:     Primary insomnia  -     trazodone (DESYREL) 50 MG tablet; 1 to 3 tabs po qhs prn  Dispense: 90 tablet; Refill: 11    Nocturia  -     Ambulatory referral to Urology    Iron deficiency anemia, unspecified iron deficiency anemia type  -     Iron and TIBC; Future; Expected date: 06/14/2017  -     Ferritin; Future; Expected date: 06/14/2017    B12 deficiency  -     Vitamin B12; Future; Expected date: 06/14/2017    Hypogonadism in male  -     TESTOSTERONE PANEL; Future; Expected date: 06/14/2017  -     SEX HORMONE BINDING GLOBULIN; Future; Expected date: 06/14/2017    Screening PSA (prostate specific antigen)  -     PSA, Screening; Future; Expected date: 06/14/2017    Fatigue, unspecified type  -     TSH; Future; Expected date: 06/14/2017      Plan:     Primary insomnia  -     trazodone (DESYREL) 50 MG tablet; 1 to 3 tabs po qhs prn  Dispense: 90 tablet; Refill: 11    Nocturia  -     Ambulatory referral to Urology    Iron deficiency anemia, unspecified iron  deficiency anemia type  -     Iron and TIBC; Future; Expected date: 06/14/2017  -     Ferritin; Future; Expected date: 06/14/2017    B12 deficiency  -     Vitamin B12; Future; Expected date: 06/14/2017    Hypogonadism in male  -     TESTOSTERONE PANEL; Future; Expected date: 06/14/2017  -     SEX HORMONE BINDING GLOBULIN; Future; Expected date: 06/14/2017    Screening PSA (prostate specific antigen)  -     PSA, Screening; Future; Expected date: 06/14/2017    Fatigue, unspecified type  -     TSH; Future; Expected date: 06/14/2017      Referred to urology for nocturia, patient declines at this time, wants to wait until he is off seroquel for a few weeks to see if the nocturia stops.   Trazodone for sleep disturbance  Patient checked on , received 60 ambien 5 mg. Tabs on 6/12/17 from another provider      Electronically signed by Sharmin Brown at 6/14/2017 11:53 AM     I also saw patient face to face and agree with NP student's H&P , I have diagnosed patient and written treatment plan.    1 month with labs prior

## 2017-06-16 LAB — SHBG SERPL-SCNC: 112 NMOL/L

## 2017-06-17 ENCOUNTER — OUTPATIENT CASE MANAGEMENT (OUTPATIENT)
Dept: ADMINISTRATIVE | Facility: OTHER | Age: 67
End: 2017-06-17

## 2017-06-17 NOTE — PROGRESS NOTES
6/17-17- 1st Attempt to screen for OPCM. Called patient at [phone] with no answer. Message left requesting return call. Will attempt to screen at a later date.

## 2017-06-21 ENCOUNTER — TELEPHONE (OUTPATIENT)
Dept: FAMILY MEDICINE | Facility: CLINIC | Age: 67
End: 2017-06-21

## 2017-06-21 ENCOUNTER — OUTPATIENT CASE MANAGEMENT (OUTPATIENT)
Dept: ADMINISTRATIVE | Facility: OTHER | Age: 67
End: 2017-06-21

## 2017-06-21 NOTE — TELEPHONE ENCOUNTER
Spoke with patient.  Results given per provider.  Verbalized understanding. Also wanted to let Laila Watt NP that sleeping medication is not working.  Has follow up appointment next month

## 2017-06-21 NOTE — PATIENT INSTRUCTIONS
Understanding Coronary Artery Disease (CAD)    To understand coronary artery disease (CAD), you need to know how your heart works. Your heart is a muscle that pumps blood throughout your body. To work right, your heart needs a steady supply of oxygen. It gets this oxygen from blood supplied by the coronary arteries.        Healthy Artery      Damaged Artery      Narrowed Artery      Blocked Artery   Healthy artery. When a coronary artery is healthy and has no blockages, blood flows through easily. Healthy arteries can easily supply the oxygen-rich blood your heart needs.  Damaged artery. Coronary artery disease begins when damage to the artery lining leads to the buildup of fat-like substances and cholesterol along the artery wall. This is called plaque. This damage could be caused by things like high blood pressure or smoking. This plaque buildup begins to narrow the arteries carrying blood to the heart. This is called atherosclerosis,  Narrowed artery. As more plaque builds up, your artery has trouble supplying blood to your heart muscle when it needs it most, such as during exercise. You may not feel any symptoms when this happens. Or you may feel angina--pressure, tightness, achiness, or pain in your chest, jaw, neck, back, or arm.  Blocked artery. A piece of plaque may break off and completely block the artery. Or a blood clot may plug the narrowed artery. When this happens, blood flow is blocked from reaching the heart. Without oxygen-rich blood, part of the heart muscle becomes damaged and stops working. You may feel crushing pressure or pain in or around your chest. This is a heart attack (acute myocardial infarction, or AMI) and is a medical emergency.  Date Last Reviewed: 3/28/2016  © 2336-7484 [employer]. [address] [place] [address], [place] [address]. All rights reserved. This information is not intended as a substitute for professional medical care. Always follow your healthcare  professional's instructions.        Heart Disease Education    The heart beats 60 to 100 times per minute, 24 hours a day. This equals almost 1000,000 times a day. It pumps blood with oxygen and nutrients to the tissues and organs of the body. But the heart is a muscle and needs its own supply of blood. Blood flow to the heart is supplied by the coronary arteries. Coronary artery disease (atherosclerosis) is a result of cholesterol, saturated fat, and calcium deposits (plaques) that build up inside the walls. This causes inflammation within the coronary arteries. These plaques narrow the artery and reduce blood flow to the heart muscle. The reduction in blood flow to the heart muscle decreases oxygen supply to the heart. If the narrowing is significant enough, the oxygen supply to one or more regions of the heart can be temporarily or permanently shut down. This can cause chest pain, and possibly death of heart tissue (heart attack).  Types of chest pain  Angina is the name for pain in the heart muscle. Angina is a warning sign of serious heart disease. When untreated it can lead to a heart attack, also known as acute myocardial infarction, or AMI. Angina occurs when there is not enough blood and oxygen flowing to the heart for the amount of work it is doing. This most often happens during physical exertion, when the heart is working hardest. It is usually relieved by rest or nitroglycerin. Angina may also occur after a large meal when extra blood is sent to the digestive organs and less goes to the heart. In the case of advanced or unstable heart disease, angina can occur at rest or awaken you from sleep. Angina usually lasts from a few minutes up to 20 minutes or more. When treated early, the effects of angina can be reversed without permanent damage to the heart. Angina is a serious condition and needs to be evaluated by a medical professional immediately.  There are two types of angina -- stable and  unstable:  · Stable angina usually occurs with a predictable level of activity. Being stable, its character, severity, and occurrence do not change much over time. It usually starts with activity, and resolves with rest or taking your medicine as instructed by your doctor. The symptoms usually do not last long.  · Unstable angina changes or gets worse over time. It is different from whatever you are used to. It may feel different or worse, begin without cause, occur with exercise or exertion, wake you up from sleep, and last longer. It may not respond in the same way as it does when you take your usual medicines for an attack. This type of angina can be a warning sign of an impending heart attack.     A heart attack is usually the result of a blood clot that suddenly forms in a coronary artery that has been narrowed with plaque. When this occurs, blood flow may be cut off to a part of the heart muscle, causing the cells to die. This weakens the pumping action of the heart, which affects the delivery of blood to all the other organs in the body including the brain. This damage is not reversible. However, early treatment can limit the amount of damage.  The pain you feel with angina and a heart attack may have a similar quality. However, it is usually different in intensity and duration. Here are some typical descriptions of a heart attack:  · It is most often experienced as a squeezing, crushing, pressure-like sensation in the center of the chest.  · It is sometimes described as something heavy sitting on my chest.  · It may feel more like a bad case of indigestion.  · The pain may spread from the chest to the arm, shoulder, throat or jaw.  · Sometimes the pain is not felt in the chest at all, but only in the arm, shoulder, throat or jaw.  · There may also be nausea, vomiting, dizziness or light-headedness, sweating and trouble breathing.  · Palpitations, or your heart beating rapidly  · A new, irregular heart  "beat  · Unexplained weakness  You may not be able to tell the difference between "bad" angina and a heart attack at home. Seek help if your symptoms are different than usual. Do not be in denial or just try to "tough it out."  Call 911  This is the fastest and safest way to get to the emergency department. The paramedics can also start treatment on the way to the hospital, saving valuable time for your heart.  · If the angina gets worse, if it continues, or if it stops and returns, call 911 immediately. Do not delay. You may be having a heart attack.  · After you call 911, take a second tablet or spray unless instructed otherwise. When repeating doses, sit down if possible, because it can make you feel lightheaded or dizzy. Wait another 5 minutes. If the angina still does not go away, take a third tablet or spray. Do not take more than 3 tablets or sprays within 15 minutes. Stay on the phone with 911 for further instruction.  · Your healthcare provider may give you slightly different instructions than those above. If so, follow them carefully.  Do not wait until symptoms become severe to call 911.  Other reasons to call 911 include:  · Trouble breathing  · Feeling lightheaded, faint, or dizzy  · Rapid heart beat  · Slower than usual heart rate compared to your normal  · Angina with weakness, dizziness, fainting, heavy sweating, nausea, or vomiting  · Extreme drowsiness, confusion  · Weakness of an arm or leg or one side of the face  · Difficulty with speech or vision  When to seek medical care  Remember, the signs and symptoms of a heart attack are not always like they are on TV. Sometimes they are not so obvious. You may only feel weak, or just not right. If it is not clear or if you have any doubt, call for advice.  · Seek help if there is a change in the type of pain, if it feels different, or if your symptoms are mild.  · Do not drive yourself. Have someone else drive you. If no one can drive, call 911.  · Do " "not delay. Fast diagnosis and treatment can prevent or limit the amount of heart damage during a heart attack.  · Do not go to your doctor's office or a clinic as they may not be able to provide all the testing and treatment required for this condition.  · If your doctor has given you medicine to take when symptoms occur, take them but don't delay getting help trying to locate medicines.  What happens in the emergency department  The emergency department is connected to your local emergency medical system (EMS) through 911. That's why during a cardiac emergency, calling 911 is the fastest way to get help. The goal of the emergency department is to rapidly screen, evaluate, and treat people.  Once you are there, an electrocardiogram (ECG or heart tracing) will be done. Blood samples may be taken to look for the presence of heart enzymes that leak from damaged heart cells and show if a heart attack is occurring. You will often be evaluated by a heart specialist (cardiologist) who decides the best course of action. In the case of severe angina or early heart attack, and depending on the circumstances, powerful "clot busting" medicines can be used to dissolve blood clots in the coronary artery. In other cases, you may be taken to a cardiac catheterization lab. Here, a tiny balloon-tipped catheter is advanced through blood vessels to the heart. There the balloon is inflated pushing open the blood vessel restoring blood flow.  Risk factors for heart disease  Risk factors for heart disease are a combination of genetic and lifestyle. Many risk factors work by either directly or indirectly damaging the blood vessels of the heart, or by increasing the risk of forming blood or cholesterol clots, which then clog up and block the arteries.     Examples of physical lifestyle risk factors:  · Cigarette smoking  · High blood pressure  · High blood cholesterol  · Use of stimulant drugs such as cocaine, crack, and " amphetamines  · Eating a high-fat, high-cholesterol meal  · Diabetes   · Obesity which increases risk for diabetes and high blood pressure  · Lack of regular physical activity     Examples of emotional lifestyle factors:  · Chronic high stress levels release stress hormones. These raise blood pressure and cholesterol level and makes blood clot more easily.  · Held-in anger, hostile or cynical attitude  · Social and emotional isolation, lack of intimacy  · Loss of relationship  · Depression  Other factors that increase the risk of heart attack that you cannot control :  · Age. The older you get beyond 40, the greater is your risk of significant coronary artery disease.  · Gender. More men than women get heart disease; but once past menopause, women who are not taking estrogen replacement have the same risk as men for a heart attack.  · Family history. If your mother, father, brother or sister has coronary artery disease, your risk of having it is higher than a person your age without this family history.  What can you do to decrease your risk  To reduce your risk of heart disease:  · Get regular checkups with your doctor.  · Take your medicines for blood pressure, cholesterol or diabetes as directed.  · Watch your diet. Eat a heart healthy diet choosing fresh foods, less salt, cholesterol, and fat  · Stop smoking. Get help if needed.  · Get regular exercise.  · Manage stress.  · Carry a list of medicines and doses in your wallet.  Date Last Reviewed: 12/30/2015  © 7509-1709 [employer]. [address] [place] [address], [place] [address]. All rights reserved. This information is not intended as a substitute for professional medical care. Always follow your healthcare professional's instructions.

## 2017-06-21 NOTE — TELEPHONE ENCOUNTER
----- Message from Mary Miranda sent at 6/21/2017 10:53 AM CDT -----  Returning your all.  Please call [phone].

## 2017-06-21 NOTE — PROGRESS NOTES
6/21/17- RN CM Called patient to complete Enrollment for [facility]. Patient is a 65 yo male with a history of CAD, hypertension, chronic Hep C and DDD. Lives alone and does not require caregiver assistance for ADLs. Reported that he also has no difficulty with IADLs. Patient reported that he does use a cane to ambulate. Reported that he does have some transportation difficulties, stating that he uses Taxi cabs for transportation that can get costly. Also reported some reported some difficulty with affording his utility bills. Has applied for Financial assistance with [facility] but has not heard any response regarding the status of his applications. RN CM will refer patient to [facility] LCSW Esvin Barraza for additional assistance. RN CM will mail resource materials to patient and follow up in 1 week.     Follow Up PLan:  -complete PHQ9 with next encounter  -received resource information?

## 2017-06-27 ENCOUNTER — OUTPATIENT CASE MANAGEMENT (OUTPATIENT)
Dept: ADMINISTRATIVE | Facility: OTHER | Age: 67
End: 2017-06-27

## 2017-06-27 NOTE — PROGRESS NOTES
"6/27/2017:   spoke to patient via telephone in order to complete assessment.  Pt reported that he lives alone and has no support.  He is calling a cab once a month to take him to the grocery store and to doctor appointments.   explained transportation with [facility] and mailed him information about their program.    Pt stated that he has completed a Medicaid application and is receiving $16 in food stamps per month.  He has also been approved for services with the [facility] Financial Assistance Program.      He has expressed some difficulty with utilities during the summer months.    Pt stated that he has started taking Prozac and has noticed a decrease in depression.  I attempted to talk to him about group therapy or IOP; however, he is not interested and stated that "group therapy doesn't work".       mailed patient information re:  [facility], [facility] [employer], [facility] [employer], and the [facility].      Plan for next encounter:  1. Ensure that patient received mailings  2. Answer questions about the programs  3. Encourage him to contact desired agencies  4. Assess for any additional needs.    "

## 2017-06-29 ENCOUNTER — OUTPATIENT CASE MANAGEMENT (OUTPATIENT)
Dept: ADMINISTRATIVE | Facility: OTHER | Age: 67
End: 2017-06-29

## 2017-06-29 NOTE — PROGRESS NOTES
6/29/17-RN CM Called patient to follow up. Reported that he has heard back from [facility] program, stated that he has qualified for assistance. Patient has also had contact with [facility] LCSW for assistance. Patient reported that he did receive resource information and has been enjoying reading the information. Discussed symptoms of heart attack, stated that he felt chest pain and indigestion unrelieved by nitroglycerin, therefore called 911. Discussed BP monitoring, reported that his Systolic readings have ranged 110s-150s with his diastolic readings ranging 70-120s. Reported that he does write down his readings down and checks his BP multiple daily. RN CM encouraged patient to bring his BP log to his next visit to review. Verbalized understanding. Will follow up with patient in 1 week to assist with needs.            Follow Up PLan:  -complete PHQ9 with next encounter--Done 6/27  -received resource information?---YES  --review Blood Pressure readings    Short Term Goals  Patient/caregiver will verbalize 3 signs and symptoms of Myocardial Infarction within 1 month.   Interventions   · Recognize and provide educational material (CAYETANO).   Status  · Partially met

## 2017-06-30 ENCOUNTER — TELEPHONE (OUTPATIENT)
Dept: FAMILY MEDICINE | Facility: CLINIC | Age: 67
End: 2017-06-30

## 2017-06-30 DIAGNOSIS — B18.2 CHRONIC HEPATITIS C WITHOUT HEPATIC COMA: Primary | ICD-10-CM

## 2017-06-30 NOTE — TELEPHONE ENCOUNTER
Patient can't afford to come until his visit on the 12th of July with Laila Watt NP but his blood pressure is up and down and he wants to know what to do

## 2017-06-30 NOTE — TELEPHONE ENCOUNTER
Spoke to pt. Given message from provider.  Attempted to schedule pt for CMP now, but pt states he cannot afford labs at this time.    Pt would like to schedule lab on 7/12, same day as f/u with PCP.   Unable to schedule lab at [facility] (private). Pt will have Laila Watt's staff schedule lab on day of visit. Order has been entered.

## 2017-06-30 NOTE — TELEPHONE ENCOUNTER
----- Message from Issa Soriano RN sent at 6/29/2017  3:25 PM CDT -----  Contact: Issa Soriano RN Outpatient Case Management  Good Afternoon,     I spoke with Mr Murray today. He reported that he has been checking his BP frequently throughout the day. He reported that he does have episodes during the day where he runs 150s/100s for the past several days. He reported compliance with his prescribed blood pressure medications. If you have any further advice, can you please contact the patient/?      Thank you,    Issa Soriano RN   Outpatient Case Management

## 2017-07-02 ENCOUNTER — HOSPITAL ENCOUNTER (OUTPATIENT)
Facility: HOSPITAL | Age: 67
Discharge: HOME | End: 2017-07-04
Attending: EMERGENCY MEDICINE | Admitting: HOSPITALIST
Payer: MEDICARE

## 2017-07-02 DIAGNOSIS — R10.9 ABDOMINAL PAIN, UNSPECIFIED LOCATION: ICD-10-CM

## 2017-07-02 DIAGNOSIS — R10.13 EPIGASTRIC PAIN: ICD-10-CM

## 2017-07-02 DIAGNOSIS — I10 ESSENTIAL HYPERTENSION, BENIGN: ICD-10-CM

## 2017-07-02 DIAGNOSIS — K59.00 CONSTIPATION, UNSPECIFIED CONSTIPATION TYPE: Primary | ICD-10-CM

## 2017-07-02 DIAGNOSIS — I25.2 HISTORY OF MI (MYOCARDIAL INFARCTION): ICD-10-CM

## 2017-07-02 DIAGNOSIS — R00.0 TACHYCARDIA: ICD-10-CM

## 2017-07-02 LAB
ALBUMIN SERPL BCP-MCNC: 4.1 G/DL
ALP SERPL-CCNC: 156 U/L
ALT SERPL W/O P-5'-P-CCNC: 20 U/L
ANION GAP SERPL CALC-SCNC: 13 MMOL/L
AST SERPL-CCNC: 23 U/L
BASOPHILS # BLD AUTO: 0.1 K/UL
BASOPHILS NFR BLD: 0.7 %
BILIRUB SERPL-MCNC: 0.5 MG/DL
BUN SERPL-MCNC: 12 MG/DL
CALCIUM SERPL-MCNC: 10 MG/DL
CHLORIDE SERPL-SCNC: 99 MMOL/L
CO2 SERPL-SCNC: 22 MMOL/L
CREAT SERPL-MCNC: 1 MG/DL
DIFFERENTIAL METHOD: ABNORMAL
EOSINOPHIL # BLD AUTO: 0 K/UL
EOSINOPHIL NFR BLD: 0 %
ERYTHROCYTE [DISTWIDTH] IN BLOOD BY AUTOMATED COUNT: 14.4 %
EST. GFR  (AFRICAN AMERICAN): >60 ML/MIN/1.73 M^2
EST. GFR  (NON AFRICAN AMERICAN): >60 ML/MIN/1.73 M^2
GLUCOSE SERPL-MCNC: 132 MG/DL
HCT VFR BLD AUTO: 41.9 %
HGB BLD-MCNC: 13.9 G/DL
LIPASE SERPL-CCNC: 47 U/L
LYMPHOCYTES # BLD AUTO: 3.4 K/UL
LYMPHOCYTES NFR BLD: 26.2 %
MCH RBC QN AUTO: 28.9 PG
MCHC RBC AUTO-ENTMCNC: 33.3 %
MCV RBC AUTO: 87 FL
MONOCYTES # BLD AUTO: 0.8 K/UL
MONOCYTES NFR BLD: 5.9 %
NEUTROPHILS # BLD AUTO: 8.8 K/UL
NEUTROPHILS NFR BLD: 67.2 %
PLATELET # BLD AUTO: 207 K/UL
PMV BLD AUTO: 7.4 FL
POTASSIUM SERPL-SCNC: 3.9 MMOL/L
PROT SERPL-MCNC: 8.2 G/DL
RBC # BLD AUTO: 4.82 M/UL
SODIUM SERPL-SCNC: 134 MMOL/L
WBC # BLD AUTO: 13.1 K/UL

## 2017-07-02 PROCEDURE — 83690 ASSAY OF LIPASE: CPT

## 2017-07-02 PROCEDURE — 80053 COMPREHEN METABOLIC PANEL: CPT

## 2017-07-02 PROCEDURE — 85025 COMPLETE CBC W/AUTO DIFF WBC: CPT

## 2017-07-02 PROCEDURE — 25000003 PHARM REV CODE 250: Performed by: EMERGENCY MEDICINE

## 2017-07-02 PROCEDURE — 36415 COLL VENOUS BLD VENIPUNCTURE: CPT

## 2017-07-02 PROCEDURE — 99285 EMERGENCY DEPT VISIT HI MDM: CPT | Mod: 25

## 2017-07-02 PROCEDURE — 96374 THER/PROPH/DIAG INJ IV PUSH: CPT

## 2017-07-02 PROCEDURE — 96375 TX/PRO/DX INJ NEW DRUG ADDON: CPT

## 2017-07-02 PROCEDURE — 96361 HYDRATE IV INFUSION ADD-ON: CPT

## 2017-07-02 PROCEDURE — 63600175 PHARM REV CODE 636 W HCPCS: Performed by: EMERGENCY MEDICINE

## 2017-07-02 RX ORDER — ONDANSETRON 2 MG/ML
4 INJECTION INTRAMUSCULAR; INTRAVENOUS
Status: COMPLETED | OUTPATIENT
Start: 2017-07-02 | End: 2017-07-02

## 2017-07-02 RX ORDER — MORPHINE SULFATE 2 MG/ML
6 INJECTION, SOLUTION INTRAMUSCULAR; INTRAVENOUS
Status: COMPLETED | OUTPATIENT
Start: 2017-07-02 | End: 2017-07-02

## 2017-07-02 RX ADMIN — ONDANSETRON 4 MG: 2 INJECTION INTRAMUSCULAR; INTRAVENOUS at 10:07

## 2017-07-02 RX ADMIN — SODIUM CHLORIDE 1000 ML: 0.9 INJECTION, SOLUTION INTRAVENOUS at 10:07

## 2017-07-02 RX ADMIN — MORPHINE SULFATE 6 MG: 2 INJECTION, SOLUTION INTRAMUSCULAR; INTRAVENOUS at 10:07

## 2017-07-03 PROBLEM — R10.13 EPIGASTRIC PAIN: Status: ACTIVE | Noted: 2017-07-03

## 2017-07-03 PROBLEM — R10.12 LEFT UPPER QUADRANT PAIN: Status: ACTIVE | Noted: 2017-07-03

## 2017-07-03 LAB
ALBUMIN SERPL BCP-MCNC: 3.6 G/DL
ALP SERPL-CCNC: 135 U/L
ALT SERPL W/O P-5'-P-CCNC: 16 U/L
ANION GAP SERPL CALC-SCNC: 11 MMOL/L
AST SERPL-CCNC: 19 U/L
BASOPHILS NFR BLD: 0 %
BILIRUB SERPL-MCNC: 0.5 MG/DL
BILIRUB UR QL STRIP: NEGATIVE
BUN SERPL-MCNC: 10 MG/DL
CALCIUM SERPL-MCNC: 9.4 MG/DL
CHLORIDE SERPL-SCNC: 103 MMOL/L
CLARITY UR: CLEAR
CO2 SERPL-SCNC: 25 MMOL/L
COLOR UR: YELLOW
CREAT SERPL-MCNC: 0.9 MG/DL
DIFFERENTIAL METHOD: ABNORMAL
EOSINOPHIL NFR BLD: 0 %
ERYTHROCYTE [DISTWIDTH] IN BLOOD BY AUTOMATED COUNT: 14.5 %
EST. GFR  (AFRICAN AMERICAN): >60 ML/MIN/1.73 M^2
EST. GFR  (NON AFRICAN AMERICAN): >60 ML/MIN/1.73 M^2
GLUCOSE SERPL-MCNC: 101 MG/DL
GLUCOSE UR QL STRIP: NEGATIVE
HCT VFR BLD AUTO: 37.9 %
HGB BLD-MCNC: 12.6 G/DL
HGB UR QL STRIP: NEGATIVE
KETONES UR QL STRIP: NEGATIVE
LEUKOCYTE ESTERASE UR QL STRIP: NEGATIVE
LYMPHOCYTES NFR BLD: 59 %
MAGNESIUM SERPL-MCNC: 1.9 MG/DL
MCH RBC QN AUTO: 29.1 PG
MCHC RBC AUTO-ENTMCNC: 33.3 %
MCV RBC AUTO: 87 FL
MONOCYTES NFR BLD: 3 %
NEUTROPHILS NFR BLD: 38 %
NITRITE UR QL STRIP: NEGATIVE
PH UR STRIP: 6 [PH] (ref 5–8)
PHOSPHATE SERPL-MCNC: 3.5 MG/DL
PLATELET # BLD AUTO: 208 K/UL
PMV BLD AUTO: 7.3 FL
POTASSIUM SERPL-SCNC: 4.1 MMOL/L
PROT SERPL-MCNC: 7.1 G/DL
PROT UR QL STRIP: NEGATIVE
RBC # BLD AUTO: 4.35 M/UL
SODIUM SERPL-SCNC: 139 MMOL/L
SP GR UR STRIP: 1.01 (ref 1–1.03)
URN SPEC COLLECT METH UR: NORMAL
UROBILINOGEN UR STRIP-ACNC: NEGATIVE EU/DL
WBC # BLD AUTO: 12.2 K/UL

## 2017-07-03 PROCEDURE — 80053 COMPREHEN METABOLIC PANEL: CPT

## 2017-07-03 PROCEDURE — 85007 BL SMEAR W/DIFF WBC COUNT: CPT | Mod: NCS

## 2017-07-03 PROCEDURE — 81003 URINALYSIS AUTO W/O SCOPE: CPT

## 2017-07-03 PROCEDURE — 63600175 PHARM REV CODE 636 W HCPCS: Performed by: NURSE PRACTITIONER

## 2017-07-03 PROCEDURE — 25000003 PHARM REV CODE 250: Performed by: NURSE PRACTITIONER

## 2017-07-03 PROCEDURE — 36415 COLL VENOUS BLD VENIPUNCTURE: CPT

## 2017-07-03 PROCEDURE — 84100 ASSAY OF PHOSPHORUS: CPT

## 2017-07-03 PROCEDURE — 25500020 PHARM REV CODE 255

## 2017-07-03 PROCEDURE — 85027 COMPLETE CBC AUTOMATED: CPT

## 2017-07-03 PROCEDURE — G0378 HOSPITAL OBSERVATION PER HR: HCPCS

## 2017-07-03 PROCEDURE — 83735 ASSAY OF MAGNESIUM: CPT

## 2017-07-03 RX ORDER — ASPIRIN 81 MG/1
81 TABLET ORAL DAILY
Status: DISCONTINUED | OUTPATIENT
Start: 2017-07-03 | End: 2017-07-04 | Stop reason: HOSPADM

## 2017-07-03 RX ORDER — BRIMONIDINE TARTRATE 1.5 MG/ML
1 SOLUTION/ DROPS OPHTHALMIC 2 TIMES DAILY
Status: DISCONTINUED | OUTPATIENT
Start: 2017-07-03 | End: 2017-07-04 | Stop reason: HOSPADM

## 2017-07-03 RX ORDER — LISINOPRIL 2.5 MG/1
5 TABLET ORAL 2 TIMES DAILY
Status: DISCONTINUED | OUTPATIENT
Start: 2017-07-03 | End: 2017-07-04 | Stop reason: HOSPADM

## 2017-07-03 RX ORDER — LACTULOSE 10 G/15ML
30 SOLUTION ORAL 3 TIMES DAILY
Status: DISCONTINUED | OUTPATIENT
Start: 2017-07-03 | End: 2017-07-04 | Stop reason: HOSPADM

## 2017-07-03 RX ORDER — TRAZODONE HYDROCHLORIDE 50 MG/1
50 TABLET ORAL NIGHTLY
Status: DISCONTINUED | OUTPATIENT
Start: 2017-07-03 | End: 2017-07-04 | Stop reason: HOSPADM

## 2017-07-03 RX ORDER — LANOLIN ALCOHOL/MO/W.PET/CERES
800 CREAM (GRAM) TOPICAL
Status: DISCONTINUED | OUTPATIENT
Start: 2017-07-03 | End: 2017-07-04 | Stop reason: HOSPADM

## 2017-07-03 RX ORDER — POTASSIUM CHLORIDE 20 MEQ/15ML
60 SOLUTION ORAL
Status: DISCONTINUED | OUTPATIENT
Start: 2017-07-03 | End: 2017-07-04 | Stop reason: HOSPADM

## 2017-07-03 RX ORDER — LACTULOSE 10 G/15ML
30 SOLUTION ORAL ONCE
Status: COMPLETED | OUTPATIENT
Start: 2017-07-03 | End: 2017-07-03

## 2017-07-03 RX ORDER — CLONIDINE HYDROCHLORIDE 0.1 MG/1
0.1 TABLET ORAL 3 TIMES DAILY
Status: DISCONTINUED | OUTPATIENT
Start: 2017-07-03 | End: 2017-07-04 | Stop reason: HOSPADM

## 2017-07-03 RX ORDER — ZOLPIDEM TARTRATE 5 MG/1
10 TABLET ORAL NIGHTLY PRN
Status: DISCONTINUED | OUTPATIENT
Start: 2017-07-03 | End: 2017-07-04 | Stop reason: HOSPADM

## 2017-07-03 RX ORDER — SYRING-NEEDL,DISP,INSUL,0.3 ML 29 G X1/2"
296 SYRINGE, EMPTY DISPOSABLE MISCELLANEOUS ONCE
Status: COMPLETED | OUTPATIENT
Start: 2017-07-03 | End: 2017-07-03

## 2017-07-03 RX ORDER — ONDANSETRON 2 MG/ML
8 INJECTION INTRAMUSCULAR; INTRAVENOUS EVERY 8 HOURS PRN
Status: DISCONTINUED | OUTPATIENT
Start: 2017-07-03 | End: 2017-07-04 | Stop reason: HOSPADM

## 2017-07-03 RX ORDER — PANTOPRAZOLE SODIUM 40 MG/1
40 TABLET, DELAYED RELEASE ORAL DAILY
Status: DISCONTINUED | OUTPATIENT
Start: 2017-07-03 | End: 2017-07-04 | Stop reason: HOSPADM

## 2017-07-03 RX ORDER — ENOXAPARIN SODIUM 100 MG/ML
40 INJECTION SUBCUTANEOUS EVERY 24 HOURS
Status: DISCONTINUED | OUTPATIENT
Start: 2017-07-03 | End: 2017-07-04 | Stop reason: HOSPADM

## 2017-07-03 RX ORDER — LACTULOSE 10 G/15ML
30 SOLUTION ORAL 3 TIMES DAILY
Status: DISCONTINUED | OUTPATIENT
Start: 2017-07-03 | End: 2017-07-03

## 2017-07-03 RX ORDER — NITROGLYCERIN 0.4 MG/1
0.4 TABLET SUBLINGUAL EVERY 5 MIN PRN
Status: DISCONTINUED | OUTPATIENT
Start: 2017-07-03 | End: 2017-07-04 | Stop reason: HOSPADM

## 2017-07-03 RX ORDER — LABETALOL 100 MG/1
100 TABLET, FILM COATED ORAL 2 TIMES DAILY
Status: DISCONTINUED | OUTPATIENT
Start: 2017-07-03 | End: 2017-07-04 | Stop reason: HOSPADM

## 2017-07-03 RX ORDER — ACETAMINOPHEN 500 MG
1000 TABLET ORAL EVERY 6 HOURS PRN
Status: DISCONTINUED | OUTPATIENT
Start: 2017-07-03 | End: 2017-07-04 | Stop reason: HOSPADM

## 2017-07-03 RX ORDER — POTASSIUM CHLORIDE 20 MEQ/15ML
40 SOLUTION ORAL
Status: DISCONTINUED | OUTPATIENT
Start: 2017-07-03 | End: 2017-07-04 | Stop reason: HOSPADM

## 2017-07-03 RX ORDER — IBUPROFEN 400 MG/1
400 TABLET ORAL EVERY 6 HOURS PRN
Status: DISCONTINUED | OUTPATIENT
Start: 2017-07-03 | End: 2017-07-04 | Stop reason: HOSPADM

## 2017-07-03 RX ORDER — POLYETHYLENE GLYCOL 3350 17 G/17G
1 POWDER, FOR SOLUTION ORAL DAILY
Qty: 1 PACKET | Refills: 0 | Status: SHIPPED | OUTPATIENT
Start: 2017-07-03 | End: 2018-02-12

## 2017-07-03 RX ORDER — BISACODYL 10 MG
10 SUPPOSITORY, RECTAL RECTAL DAILY PRN
Status: DISCONTINUED | OUTPATIENT
Start: 2017-07-03 | End: 2017-07-04 | Stop reason: HOSPADM

## 2017-07-03 RX ORDER — PRASUGREL 5 MG/1
10 TABLET, FILM COATED ORAL DAILY
Status: DISCONTINUED | OUTPATIENT
Start: 2017-07-03 | End: 2017-07-04 | Stop reason: HOSPADM

## 2017-07-03 RX ORDER — LATANOPROST 50 UG/ML
1 SOLUTION/ DROPS OPHTHALMIC NIGHTLY
Status: DISCONTINUED | OUTPATIENT
Start: 2017-07-03 | End: 2017-07-04 | Stop reason: HOSPADM

## 2017-07-03 RX ORDER — FLUOXETINE HYDROCHLORIDE 20 MG/1
40 CAPSULE ORAL DAILY
Status: DISCONTINUED | OUTPATIENT
Start: 2017-07-03 | End: 2017-07-04 | Stop reason: HOSPADM

## 2017-07-03 RX ORDER — VERAPAMIL HYDROCHLORIDE 240 MG/1
240 TABLET, FILM COATED, EXTENDED RELEASE ORAL NIGHTLY
Status: DISCONTINUED | OUTPATIENT
Start: 2017-07-03 | End: 2017-07-04 | Stop reason: HOSPADM

## 2017-07-03 RX ORDER — TIZANIDINE 2 MG/1
2 TABLET ORAL EVERY 8 HOURS PRN
Status: DISCONTINUED | OUTPATIENT
Start: 2017-07-03 | End: 2017-07-04 | Stop reason: HOSPADM

## 2017-07-03 RX ORDER — MORPHINE SULFATE 2 MG/ML
2 INJECTION, SOLUTION INTRAMUSCULAR; INTRAVENOUS EVERY 4 HOURS PRN
Status: DISCONTINUED | OUTPATIENT
Start: 2017-07-03 | End: 2017-07-03

## 2017-07-03 RX ADMIN — IOHEXOL 75 ML: 350 INJECTION, SOLUTION INTRAVENOUS at 01:07

## 2017-07-03 RX ADMIN — BRIMONIDINE TARTRATE 1 DROP: 1.5 SOLUTION OPHTHALMIC at 08:07

## 2017-07-03 RX ADMIN — ZOLPIDEM TARTRATE 10 MG: 5 TABLET, FILM COATED ORAL at 11:07

## 2017-07-03 RX ADMIN — ACETAMINOPHEN 1000 MG: 500 TABLET, FILM COATED ORAL at 11:07

## 2017-07-03 RX ADMIN — LABETALOL HCL 100 MG: 100 TABLET, FILM COATED ORAL at 08:07

## 2017-07-03 RX ADMIN — PANTOPRAZOLE SODIUM 40 MG: 40 TABLET, DELAYED RELEASE ORAL at 08:07

## 2017-07-03 RX ADMIN — FLUOXETINE 40 MG: 20 CAPSULE ORAL at 08:07

## 2017-07-03 RX ADMIN — LABETALOL HCL 100 MG: 100 TABLET, FILM COATED ORAL at 07:07

## 2017-07-03 RX ADMIN — TIZANIDINE 2 MG: 2 TABLET ORAL at 09:07

## 2017-07-03 RX ADMIN — LATANOPROST 1 DROP: 50 SOLUTION OPHTHALMIC at 09:07

## 2017-07-03 RX ADMIN — LACTULOSE 30 G: 20 SOLUTION ORAL at 02:07

## 2017-07-03 RX ADMIN — ASPIRIN 81 MG: 81 TABLET, COATED ORAL at 08:07

## 2017-07-03 RX ADMIN — LISINOPRIL 5 MG: 2.5 TABLET ORAL at 08:07

## 2017-07-03 RX ADMIN — CLONIDINE HYDROCHLORIDE 0.1 MG: 0.1 TABLET ORAL at 09:07

## 2017-07-03 RX ADMIN — IBUPROFEN 400 MG: 400 TABLET ORAL at 09:07

## 2017-07-03 RX ADMIN — TRAZODONE HYDROCHLORIDE 50 MG: 50 TABLET ORAL at 09:07

## 2017-07-03 RX ADMIN — BRIMONIDINE TARTRATE 1 DROP: 1.5 SOLUTION OPHTHALMIC at 09:07

## 2017-07-03 RX ADMIN — LISINOPRIL 5 MG: 2.5 TABLET ORAL at 07:07

## 2017-07-03 RX ADMIN — ENOXAPARIN SODIUM 40 MG: 100 INJECTION SUBCUTANEOUS at 05:07

## 2017-07-03 RX ADMIN — IBUPROFEN 400 MG: 400 TABLET ORAL at 08:07

## 2017-07-03 RX ADMIN — CLONIDINE HYDROCHLORIDE 0.1 MG: 0.1 TABLET ORAL at 03:07

## 2017-07-03 RX ADMIN — PRASUGREL HYDROCHLORIDE 10 MG: 5 TABLET, FILM COATED ORAL at 08:07

## 2017-07-03 RX ADMIN — BISACODYL 10 MG: 10 SUPPOSITORY RECTAL at 01:07

## 2017-07-03 RX ADMIN — VERAPAMIL HYDROCHLORIDE 240 MG: 240 TABLET, FILM COATED, EXTENDED RELEASE ORAL at 07:07

## 2017-07-03 RX ADMIN — MAGNESIUM CITRATE 296 ML: 1.75 LIQUID ORAL at 11:07

## 2017-07-03 RX ADMIN — CLONIDINE HYDROCHLORIDE 0.1 MG: 0.1 TABLET ORAL at 02:07

## 2017-07-03 NOTE — ED NOTES
Patient identifiers for Benny Murray checked and correct.  LOC: Patient is awake, alert, and aware of environment with an appropriate affect. Patient is oriented x 3 and speaking appropriately.  APPEARANCE: Patient resting comfortably and in no acute distress. Patient is clean and well groomed, patient's clothing is properly fastened.  SKIN: The skin is warm and dry. Patient has normal skin turgor and moist mucus membrances. Skin is intact; no bruising or breakdown noted.  MUSCULOSKELETAL: Patient is moving all extremities well, no obvious deformities noted. Pulses intact.   RESPIRATORY: Airway is open and patent. Respirations are spontaneous and non-labored with normal effort and rate.  CARDIAC: Patient has a normal rate and rhythm. No peripheral edema noted. Capillary refill < 3 seconds.  ABDOMEN: C/o distention, reports last BM 2 days ago. Reports epigastric pain worsening today, pain in the upper quadrants mainly. C/o intermittent nausea, denies vomiting.   NEUROLOGICAL: PERRL. Facial expression is symmetrical. Hand grasps are equal bilaterally. Normal sensation in all extremities when touched with finger.  Allergies reported: Review of patient's allergies indicates:  No Known Allergies

## 2017-07-03 NOTE — PLAN OF CARE
"CM met with patient at bedside and verified demographic and insurance information. Patient lives alone and denies any assistance with his care. He reports next of kin is son Garfield Murray [phone] and daughter Auorra Murray [phone].  Patient denies a POA or living will. He uses a cane at home, pharmacy is [facility].  Patient does not drive and uses taxi for transportation when he can afford to do it. He did mention that he may not have money for taxi to go home stating " this month has been so expensive with being in hospital so much"  Patient reports being in the hospital 3 times in 6 weeks. PCP is Dr. Valentino Dickson. Patient denies any discharge needs but transportation may be a problem.  Cm provided patient with discharge planning folder and patient stated that he knows all about it and has several at home.      07/03/17 1040   Discharge Assessment   Assessment Type Discharge Planning Assessment   Confirmed/corrected address and phone number on facesheet? Yes   Assessment information obtained from? Patient   Communicated expected length of stay with patient/caregiver yes   Prior to hospitilization cognitive status: Alert/Oriented   Prior to hospitalization functional status: Assistive Equipment   Current cognitive status: Alert/Oriented   Current Functional Status: Assistive Equipment   Arrived From home or self-care   Lives With alone   Able to Return to Prior Arrangements yes   Is patient able to care for self after discharge? Yes   How many people do you have in your home that can help with your care after discharge? 0   Who are your caregiver(s) and their phone number(s)? patient denies any caregivers   Patient's perception of discharge disposition home or selfcare   Readmission Within The Last 30 Days current reason for admission unrelated to previous admission   Patient currently being followed by outpatient case management? Yes   If yes, name of outpatient case management following: [facility] " outpatient case management   Patient currently receives home health services? No   Does the patient currently use HME? No   Patient currently receives private duty nursing? No   Patient currently receives any other outside agency services? No   Equipment Currently Used at Home cane, straight   Do you have any problems affording any of your prescribed medications? No   Is the patient taking medications as prescribed? yes   Do you have any financial concerns preventing you from receiving the healthcare you need? No   Does the patient have transportation to healthcare appointments? No   Transportation Available taxi   On Dialysis? No   Does the patient receive services at the Coumadin Clinic? No   Are there any open cases? No   Discharge Plan A Home   Patient/Family In Agreement With Plan yes

## 2017-07-03 NOTE — ASSESSMENT & PLAN NOTE
Chronic, BP slightly elevated-- particularly diastolic.  Will continue clonidine, labetalol, and lisinopril as per home regimen.  May require dose titration-- will monitor BP closely.

## 2017-07-03 NOTE — ED PROVIDER NOTES
Encounter Date: 7/2/2017    SCRIBE #1 NOTE: I, Kalyani Serrato, am scribing for, and in the presence of, Dr. Burch.       History     Chief Complaint   Patient presents with    Abdominal Pain     Bloated feeling and pain LUQ Starting this morning        07/02/2017 9:45 PM     Chief Complaint:       The patient is a 66 y.o. male with a history of HTN, CAD, and MI who presents to the ED with complaints of abd in the LUQ pain starting this morning. Pain is exacerbated with sitting up. Pt states that the pain is not similar to the CP from his previous heart attack. Pt reports taking Jesenia [place] for the pain. He states that his blood pressure has been irregular over the past 3 weeks and regularly takes blood pressure medication. He reports that he has not followed up with Dr Dickson for his hepatitis. Pt denies vomiting and change in bowel movements. No known drug allergies noted.                       The history is provided by the patient.     Review of patient's allergies indicates:  No Known Allergies  Past Medical History:   Diagnosis Date    Anemia     Anxiety     Arthritis     Blood transfusion     x4    Cancer BASAL CELL CHEST - NEW DX    Cataract     ou done    Clotting disorder     Coronary artery disease     Depression     Heart abnormalities     Hypertension     Myocardial infarction 10 YRS AGO    silent    Osteoporosis     Wears glasses      Past Surgical History:   Procedure Laterality Date    APPENDECTOMY      Avastin os #2  8-    BACK SURGERY  2003    CATARACT EXTRACTION      os 12-13-12 od d 5-15-13//    EYE SURGERY  LEFT CATARACT 1/13    SPINE SURGERY      lumbar spine     Family History   Problem Relation Age of Onset    Heart disease Mother     Heart disease Father     Cancer Maternal Aunt      lymphoma    Pancreatic cancer Sister     Amblyopia Neg Hx     Blindness Neg Hx     Cataracts Neg Hx     Diabetes Neg Hx     Glaucoma Neg Hx     Hypertension Neg Hx     Macular  degeneration Neg Hx     Retinal detachment Neg Hx     Strabismus Neg Hx     Stroke Neg Hx     Thyroid disease Neg Hx      Social History   Substance Use Topics    Smoking status: Never Smoker    Smokeless tobacco: Never Used    Alcohol use No     Review of Systems   Constitutional: Negative for appetite change and fever.   HENT: Negative for congestion.    Eyes: Negative for visual disturbance.   Respiratory: Negative for wheezing.    Cardiovascular: Negative for chest pain.   Gastrointestinal: Positive for abdominal pain. Negative for constipation and vomiting.   Genitourinary: Negative for dysuria.   Musculoskeletal: Positive for back pain. Negative for joint swelling.   Skin: Negative for rash.   Neurological: Positive for headaches. Negative for syncope.   Hematological: Does not bruise/bleed easily.   Psychiatric/Behavioral: Negative for confusion.       Physical Exam     Initial Vitals [07/02/17 2057]   BP Pulse Resp Temp SpO2   (!) 160/103 96 16 99 °F (37.2 °C) (!) 93 %      MAP       122         Physical Exam    Constitutional: He appears well-nourished.   HENT:   Head: Normocephalic and atraumatic.   Eyes: Conjunctivae and EOM are normal.   Neck: Normal range of motion. Neck supple. No thyroid mass present.   Cardiovascular: Normal rate and regular rhythm.   Abdominal: Soft. Normal appearance and bowel sounds are normal. He exhibits distension. There is tenderness.   Slight abdominal distension without epigastric and LUQ tenderness, no rigidity   Musculoskeletal: He exhibits no edema or tenderness.   Neurological: He is alert and oriented to person, place, and time. He has normal strength. No cranial nerve deficit or sensory deficit.   Skin: Skin is warm and dry. No rash noted. No erythema.   Psychiatric: He has a normal mood and affect. His speech is normal. Cognition and memory are normal.         ED Course   Procedures  Labs Reviewed - No data to display          Medical Decision Making:   Initial  Assessment:   The patient was interviewed and examined and does appear to be expressing discomfort secondary to growing abdominal distention, pain, and 2 days of constipation.  He reports the pain feels similar to his past exacerbation of hepatitis.  Screening labs and imaging will be obtained.  IV was established and he did have significant improvement in symptoms with single dose IV morphine.  Differential Diagnosis:   DDX include, but are not limited to, gastritis, pancreatitis, hepatitis, cholecystitis, choledocholithiasis, small bowel obstruction, perforation, ileus, AAA, pyelonephritis  ED Management:  Workup today indicates acute leukocytosis at a value of 13,000.  Imaging are remarkable for evidence of constipation.  On reassessment, the patient reported ongoing symptoms that he would not associated with constipation and a CT scan was performed which showed concern for cluster of small bowel in the left upper quadrant.  I spoke with the virtual radiologist who thought these findings may reflect a internal hernia versus early SBO.  Later imaging analyses with oral contrast recommended.  Case was discussed with Mrs. Arteaga who accepted the patient, with a request for routine surgical consultation after admission.  Patient was updated on the by mouth seen he was transported to a telemetry bed.            Scribe Attestation:   Scribe #1: I performed the above scribed service and the documentation accurately describes the services I performed. I attest to the accuracy of the note.    Attending Attestation:           Physician Attestation for Scribe:  Physician Attestation Statement for Scribe #1: I, Dr. Burch, reviewed documentation, as scribed by Kalyani Serrato in my presence, and it is both accurate and complete.                 ED Course     Clinical Impression:   The encounter diagnosis was Epigastric pain.    Disposition:   Disposition: Admitted  Condition: Thomas Burch,  MD  07/03/17 0319

## 2017-07-03 NOTE — PLAN OF CARE
Problem: Pain, Acute (Adult)  Goal: Identify Related Risk Factors and Signs and Symptoms  Related risk factors and signs and symptoms are identified upon initiation of Human Response Clinical Practice Guideline (CPG)   Outcome: Ongoing (interventions implemented as appropriate)  Alert/oriented  Denies c/o pain, denies nausea/vomiting  Sinus rhythm  Clear yellow urine  Bowel sounds x4 quadrants. Right upper abdomen guarded with touch  Safe

## 2017-07-03 NOTE — NURSING
Alert and oriented. Very thirsty. Instructed on npo status. Denies pain/discomfort. Instructed on plan of care. Verbalized understanding.

## 2017-07-03 NOTE — PLAN OF CARE
Problem: Patient Care Overview  Goal: Plan of Care Review  Outcome: Ongoing (interventions implemented as appropriate)   07/03/17 5853   Coping/Psychosocial   Plan Of Care Reviewed With patient   NAD noted. POC reviewed w pt and they verbalized understanding. Tele-  SR   Pt free from falls, Pt ambulates to the bathroom. Bed in low position, side rails up x 2. Call light in reach,  and wheels locked.  Pt had large BM

## 2017-07-03 NOTE — ASSESSMENT & PLAN NOTE
Chronic, continue ASA/ Prasugrel.  Monitor on telemetry.  Monitor for s/sx angina. PRN NTG for chest pain.

## 2017-07-03 NOTE — ED NOTES
MD at bedside for explanation of test results, pt verbalizes understanding and reports no further questions or complaints at this time. Awaiting further instructions

## 2017-07-03 NOTE — HPI
"Benny Murray is a 66 y.o. Male with PMHx significant for CAD, anemia, depression, anxiety, and HTN.  He was admitted for observation for LUQ/epigastric pain.  He reported to the ED with compaint of abdominal pain to his LUQ/epigastrum.  The pain began abruptly yesterday approximately 2 hours after he ate breakfast.  He states while eating breakfast he had a hard time swallowing because he felt like he had "a basketball deflate in his abdomen."  He noticed significant abdominal distension and was unable to belch.  Two hours later he began with severe, sharp LUQ abdominal pain that worsened when sitting or standing up and resolved while laying flat. He tried drinking a [employer] root beer to induce belching without effect.  Jesenia seltzer and gas-x were also ineffective. The pain radiates to the epigastrum.  He denies any diarrhea or constipation and reports a normal BM Saturday.  No black, tarry, or bloody stool.  He denies any nausea or vomiting.  He denies fever, chills, chest pain, of SOB.  He was recently seen for similar type pain to RUQ and was diagnosed with hepatitis (originally thought to be acalculous cholecystitis. He has not followed up with hepatology.  Other pertinent medical history as below:  "

## 2017-07-03 NOTE — SUBJECTIVE & OBJECTIVE
"Past Medical History:   Diagnosis Date    Anemia     Anxiety     Arthritis     Blood transfusion     x4    Cancer BASAL CELL CHEST - NEW DX    Cataract     ou done    Clotting disorder     Coronary artery disease     Depression     Heart abnormalities     Hypertension     Myocardial infarction 10 YRS AGO    silent    Osteoporosis     Wears glasses        Past Surgical History:   Procedure Laterality Date    APPENDECTOMY      Avastin os #2  8-    BACK SURGERY  2003    CATARACT EXTRACTION      os 12-13-12 od d 5-15-13//    EYE SURGERY  LEFT CATARACT 1/13    SPINE SURGERY      lumbar spine       Review of patient's allergies indicates:  No Known Allergies    No current facility-administered medications on file prior to encounter.      Current Outpatient Prescriptions on File Prior to Encounter   Medication Sig    aspirin (ECOTRIN) 81 MG EC tablet Take 81 mg by mouth once daily.     BD LUER-RADHA SYRINGE 3 mL 23 x 1 1/2" Syrg     brimonidine 0.15 % OPTH DROP (ALPHAGAN) 0.15 % ophthalmic solution INSTILL 1 DROP INTO EACH EYE TWICE A DAY    cloNIDine (CATAPRES) 0.1 MG tablet Take 1 tablet (0.1 mg total) by mouth 3 (three) times daily.    EFFIENT 10 mg Tab Take 1 tablet by mouth once daily.    fluoxetine (PROZAC) 40 MG capsule Take 1 capsule (40 mg total) by mouth once daily. (Patient taking differently: Take 40 mg by mouth once daily. OUT OF MEDICATION X 1 WEEK)    labetalol (NORMODYNE) 100 MG tablet TAKE ONE TABLET BY MOUTH TWICE DAILY    latanoprost 0.005 % ophthalmic solution PLACE 1 DROP INTO THE RIGHT EYE EVERY EVENING    lisinopril (PRINIVIL,ZESTRIL) 5 MG tablet Take 1 tablet by mouth 2 (two) times daily.     MELATONIN ORAL Take 10 mg by mouth nightly.    pantoprazole (PROTONIX) 40 MG tablet Take 1 tablet by mouth once daily.    polyethylene glycol (GLYCOLAX) 17 gram/dose powder TAKE 17 GRAMS MIXED IN LIQUID ONCE DAILY    TIZANIDINE HCL (ZANAFLEX ORAL) Take 2 mg by mouth once " "daily.     trazodone (DESYREL) 50 MG tablet 1 to 3 tabs po qhs prn    verapamil (CALAN-SR) 240 MG CR tablet TAKE 1 TABLET BY MOUTH ONCE q hs    zolpidem (AMBIEN) 5 MG Tab Take 10 mg by mouth. OUT OF MEDICATION    nitroGLYCERIN (NITROSTAT) 0.4 MG SL tablet Place 0.4 mg under the tongue every 5 (five) minutes as needed for Chest pain.     Family History     Problem Relation (Age of Onset)    Cancer Maternal Aunt    Heart disease Mother, Father    Pancreatic cancer Sister        Social History Main Topics    Smoking status: Never Smoker    Smokeless tobacco: Never Used    Alcohol use No    Drug use: No    Sexual activity: Yes     Partners: Female     Review of Systems   Constitutional: Positive for appetite change. Negative for activity change, chills and fever.   HENT: Positive for trouble swallowing (due to "gas"). Negative for congestion, postnasal drip and sore throat.    Eyes: Negative for photophobia and visual disturbance.   Respiratory: Negative for cough, choking, chest tightness and shortness of breath.    Cardiovascular: Negative for chest pain, palpitations and leg swelling.   Gastrointestinal: Positive for abdominal distention. Negative for blood in stool, constipation, diarrhea, nausea and vomiting.   Genitourinary: Negative for difficulty urinating, dysuria and hematuria.   Musculoskeletal: Negative for arthralgias, back pain and myalgias.   Skin: Negative for color change.   Neurological: Negative for dizziness, weakness, light-headedness, numbness and headaches.   Psychiatric/Behavioral: Positive for sleep disturbance. Negative for confusion. The patient is not nervous/anxious.      Objective:     Vital Signs (Most Recent):  Temp: 98.4 °F (36.9 °C) (07/03/17 0252)  Pulse: 81 (07/03/17 0525)  Resp: 12 (07/03/17 0252)  BP: (!) 131/90 (07/03/17 0525)  SpO2: 97 % (07/03/17 0252) Vital Signs (24h Range):  Temp:  [98.4 °F (36.9 °C)-99 °F (37.2 °C)] 98.4 °F (36.9 °C)  Pulse:  [81-96] 81  Resp:  " [12-16] 12  SpO2:  [92 %-97 %] 97 %  BP: (128-160)/() 131/90     Weight: 83 kg (183 lb)  Body mass index is 27.02 kg/m².    Physical Exam   Constitutional: He is oriented to person, place, and time. He appears well-developed and well-nourished. No distress.   HENT:   Head: Normocephalic and atraumatic.   Eyes: Conjunctivae and EOM are normal. Pupils are equal, round, and reactive to light.   Neck: Normal range of motion. Neck supple. No thyromegaly present.   Cardiovascular: Normal rate, regular rhythm, normal heart sounds and intact distal pulses.    No murmur heard.  Pulmonary/Chest: Effort normal and breath sounds normal. No respiratory distress.   Abdominal: Soft. Bowel sounds are normal. He exhibits distension. He exhibits no mass. There is tenderness (TTP epigastrum and LUQ). There is guarding (voluntary guarding). There is no rebound.   Musculoskeletal: Normal range of motion. He exhibits no edema or tenderness.   Neurological: He is alert and oriented to person, place, and time. No cranial nerve deficit.   Skin: Skin is warm and dry. No erythema. There is pallor.   Psychiatric: He has a normal mood and affect. His behavior is normal. Judgment and thought content normal.        Significant Labs:   CBC:   Recent Labs  Lab 07/02/17 2215 07/03/17  0422   WBC 13.10* 12.20   HGB 13.9* 12.6*   HCT 41.9 37.9*    208     CMP:   Recent Labs  Lab 07/02/17 2215 07/03/17  0422   * 139   K 3.9 4.1   CL 99 103   CO2 22* 25   * 101   BUN 12 10   CREATININE 1.0 0.9   CALCIUM 10.0 9.4   PROT 8.2 7.1   ALBUMIN 4.1 3.6   BILITOT 0.5 0.5   ALKPHOS 156* 135   AST 23 19   ALT 20 16   ANIONGAP 13 11   EGFRNONAA >60 >60     Lipase:   Recent Labs  Lab 07/02/17 2215   LIPASE 47     Urine Studies:   Recent Labs  Lab 07/03/17  0000   COLORU Yellow   APPEARANCEUA Clear   PHUR 6.0   SPECGRAV 1.015   PROTEINUA Negative   GLUCUA Negative   KETONESU Negative   BILIRUBINUA Negative   OCCULTUA Negative   NITRITE  Negative   UROBILINOGEN Negative   LEUKOCYTESUR Negative       Significant Imaging:     CT Abd: Air-fluid levels throughout the abdomen, likely within both small and large bowel, which can be seen  with enterocolitis or any cause of diarrhea.

## 2017-07-03 NOTE — H&P
"[facility] Hospital Medicine  History & Physical    Patient Name: Benny Murray  MRN: [id number]  Admission Date: 7/2/2017  Attending Physician: Ketan Melchor MD   Primary Care Provider: Valentino Dickson MD         Patient information was obtained from patient and ER records.     Subjective:     Principal Problem:Left upper quadrant pain    Chief Complaint:   Chief Complaint   Patient presents with    Abdominal Pain     Bloated feeling and pain LUQ Starting this morning         HPI: Benny Murray is a 66 y.o. Male with PMHx significant for CAD, anemia, depression, anxiety, and HTN.  He was admitted for observation for LUQ/epigastric pain.  He reported to the ED with compaint of abdominal pain to his LUQ/epigastrum.  The pain began abruptly yesterday approximately 2 hours after he ate breakfast.  He states while eating breakfast he had a hard time swallowing because he felt like he had "a basketball deflate in his abdomen."  He noticed significant abdominal distension and was unable to belch.  Two hours later he began with severe, sharp LUQ abdominal pain that worsened when sitting or standing up and resolved while laying flat. He tried drinking a [employer] root beer to induce belching without effect.  Jesenia seltzer and gas-x were also ineffective. The pain radiates to the epigastrum.  He denies any diarrhea or constipation and reports a normal BM Saturday.  No black, tarry, or bloody stool.  He denies any nausea or vomiting.  He denies fever, chills, chest pain, of SOB.  He was recently seen for similar type pain to RUQ and was diagnosed with hepatitis (originally thought to be acalculous cholecystitis. He has not followed up with hepatology.  Other pertinent medical history as below:    Past Medical History:   Diagnosis Date    Anemia     Anxiety     Arthritis     Blood transfusion     x4    Cancer BASAL CELL CHEST - NEW DX    Cataract     ou done    Clotting disorder     Coronary " "artery disease     Depression     Heart abnormalities     Hypertension     Myocardial infarction 10 YRS AGO    silent    Osteoporosis     Wears glasses        Past Surgical History:   Procedure Laterality Date    APPENDECTOMY      Avastin os #2  8-    BACK SURGERY  2003    CATARACT EXTRACTION      os 12-13-12 od d 5-15-13//    EYE SURGERY  LEFT CATARACT 1/13    SPINE SURGERY      lumbar spine       Review of patient's allergies indicates:  No Known Allergies    No current facility-administered medications on file prior to encounter.      Current Outpatient Prescriptions on File Prior to Encounter   Medication Sig    aspirin (ECOTRIN) 81 MG EC tablet Take 81 mg by mouth once daily.     BD LUER-RADHA SYRINGE 3 mL 23 x 1 1/2" Syrg     brimonidine 0.15 % OPTH DROP (ALPHAGAN) 0.15 % ophthalmic solution INSTILL 1 DROP INTO EACH EYE TWICE A DAY    cloNIDine (CATAPRES) 0.1 MG tablet Take 1 tablet (0.1 mg total) by mouth 3 (three) times daily.    EFFIENT 10 mg Tab Take 1 tablet by mouth once daily.    fluoxetine (PROZAC) 40 MG capsule Take 1 capsule (40 mg total) by mouth once daily. (Patient taking differently: Take 40 mg by mouth once daily. OUT OF MEDICATION X 1 WEEK)    labetalol (NORMODYNE) 100 MG tablet TAKE ONE TABLET BY MOUTH TWICE DAILY    latanoprost 0.005 % ophthalmic solution PLACE 1 DROP INTO THE RIGHT EYE EVERY EVENING    lisinopril (PRINIVIL,ZESTRIL) 5 MG tablet Take 1 tablet by mouth 2 (two) times daily.     MELATONIN ORAL Take 10 mg by mouth nightly.    pantoprazole (PROTONIX) 40 MG tablet Take 1 tablet by mouth once daily.    polyethylene glycol (GLYCOLAX) 17 gram/dose powder TAKE 17 GRAMS MIXED IN LIQUID ONCE DAILY    TIZANIDINE HCL (ZANAFLEX ORAL) Take 2 mg by mouth once daily.     trazodone (DESYREL) 50 MG tablet 1 to 3 tabs po qhs prn    verapamil (CALAN-SR) 240 MG CR tablet TAKE 1 TABLET BY MOUTH ONCE q hs    zolpidem (AMBIEN) 5 MG Tab Take 10 mg by mouth. OUT OF " "MEDICATION    nitroGLYCERIN (NITROSTAT) 0.4 MG SL tablet Place 0.4 mg under the tongue every 5 (five) minutes as needed for Chest pain.     Family History     Problem Relation (Age of Onset)    Cancer Maternal Aunt    Heart disease Mother, Father    Pancreatic cancer Sister        Social History Main Topics    Smoking status: Never Smoker    Smokeless tobacco: Never Used    Alcohol use No    Drug use: No    Sexual activity: Yes     Partners: Female     Review of Systems   Constitutional: Positive for appetite change. Negative for activity change, chills and fever.   HENT: Positive for trouble swallowing (due to "gas"). Negative for congestion, postnasal drip and sore throat.    Eyes: Negative for photophobia and visual disturbance.   Respiratory: Negative for cough, choking, chest tightness and shortness of breath.    Cardiovascular: Negative for chest pain, palpitations and leg swelling.   Gastrointestinal: Positive for abdominal distention. Negative for blood in stool, constipation, diarrhea, nausea and vomiting.   Genitourinary: Negative for difficulty urinating, dysuria and hematuria.   Musculoskeletal: Negative for arthralgias, back pain and myalgias.   Skin: Negative for color change.   Neurological: Negative for dizziness, weakness, light-headedness, numbness and headaches.   Psychiatric/Behavioral: Positive for sleep disturbance. Negative for confusion. The patient is not nervous/anxious.      Objective:     Vital Signs (Most Recent):  Temp: 98.4 °F (36.9 °C) (07/03/17 0252)  Pulse: 81 (07/03/17 0525)  Resp: 12 (07/03/17 0252)  BP: (!) 131/90 (07/03/17 0525)  SpO2: 97 % (07/03/17 0252) Vital Signs (24h Range):  Temp:  [98.4 °F (36.9 °C)-99 °F (37.2 °C)] 98.4 °F (36.9 °C)  Pulse:  [81-96] 81  Resp:  [12-16] 12  SpO2:  [92 %-97 %] 97 %  BP: (128-160)/() 131/90     Weight: 83 kg (183 lb)  Body mass index is 27.02 kg/m².    Physical Exam   Constitutional: He is oriented to person, place, and time. He " appears well-developed and well-nourished. No distress.   HENT:   Head: Normocephalic and atraumatic.   Eyes: Conjunctivae and EOM are normal. Pupils are equal, round, and reactive to light.   Neck: Normal range of motion. Neck supple. No thyromegaly present.   Cardiovascular: Normal rate, regular rhythm, normal heart sounds and intact distal pulses.    No murmur heard.  Pulmonary/Chest: Effort normal and breath sounds normal. No respiratory distress.   Abdominal: Soft. Bowel sounds are normal. He exhibits distension. He exhibits no mass. There is tenderness (TTP epigastrum and LUQ). There is guarding (voluntary guarding). There is no rebound.   Musculoskeletal: Normal range of motion. He exhibits no edema or tenderness.   Neurological: He is alert and oriented to person, place, and time. No cranial nerve deficit.   Skin: Skin is warm and dry. No erythema. There is pallor.   Psychiatric: He has a normal mood and affect. His behavior is normal. Judgment and thought content normal.        Significant Labs:   CBC:   Recent Labs  Lab 07/02/17 2215 07/03/17  0422   WBC 13.10* 12.20   HGB 13.9* 12.6*   HCT 41.9 37.9*    208     CMP:   Recent Labs  Lab 07/02/17 2215 07/03/17  0422   * 139   K 3.9 4.1   CL 99 103   CO2 22* 25   * 101   BUN 12 10   CREATININE 1.0 0.9   CALCIUM 10.0 9.4   PROT 8.2 7.1   ALBUMIN 4.1 3.6   BILITOT 0.5 0.5   ALKPHOS 156* 135   AST 23 19   ALT 20 16   ANIONGAP 13 11   EGFRNONAA >60 >60     Lipase:   Recent Labs  Lab 07/02/17  2215   LIPASE 47     Urine Studies:   Recent Labs  Lab 07/03/17  0000   COLORU Yellow   APPEARANCEUA Clear   PHUR 6.0   SPECGRAV 1.015   PROTEINUA Negative   GLUCUA Negative   KETONESU Negative   BILIRUBINUA Negative   OCCULTUA Negative   NITRITE Negative   UROBILINOGEN Negative   LEUKOCYTESUR Negative       Significant Imaging:     CT Abd: Air-fluid levels throughout the abdomen, likely within both small and large bowel, which can be seen  with  enterocolitis or any cause of diarrhea.    Assessment/Plan:     * Left upper quadrant pain    NEW, CT imaging (VRAD interpretation) indicates Air-fluid levels throughout the abdomen, likely within both small and large bowel, which can be seen  with enterocolitis or any cause of diarrhea.  Dr. Burch expressed that radiologist called him personally and mentioned  concern for cluster of small bowel in the left upper quadrant and thought these findings may reflect a internal hernia versus early SBO.  This information is unavailable to me on visualization of the report.  Subsequently, I will place the patient NPO.  IV hydration.  Consult general surgeon to further evaluate patient and diagnostics.  Of note, CT was not completed with contrast and repeat with contrast would likely have more diagnostic yield.          Essential hypertension    Chronic, BP slightly elevated-- particularly diastolic.  Will continue clonidine, labetalol, and lisinopril as per home regimen.  May require dose titration-- will monitor BP closely.        Recurrent major depressive disorder    Chronic, continue routine medication.          Coronary artery disease involving native coronary artery of native heart without angina pectoris    Chronic, continue ASA/ Prasugrel.  Monitor on telemetry.  Monitor for s/sx angina. PRN NTG for chest pain.          Glaucoma suspect    Chronic, continue opthalmic agents.            VTE Risk Mitigation         Ordered     enoxaparin injection 40 mg  Daily     Route:  Subcutaneous        07/03/17 0247     Medium Risk of VTE  Once      07/03/17 0247        Lorraine Arteaga NP  Department of Hospital Medicine   [facility]

## 2017-07-04 VITALS
DIASTOLIC BLOOD PRESSURE: 97 MMHG | WEIGHT: 183 LBS | SYSTOLIC BLOOD PRESSURE: 127 MMHG | TEMPERATURE: 97 F | BODY MASS INDEX: 27.11 KG/M2 | HEART RATE: 88 BPM | RESPIRATION RATE: 18 BRPM | HEIGHT: 69 IN | OXYGEN SATURATION: 95 %

## 2017-07-04 LAB
ALBUMIN SERPL BCP-MCNC: 3.6 G/DL
ALP SERPL-CCNC: 131 U/L
ALT SERPL W/O P-5'-P-CCNC: 15 U/L
ANION GAP SERPL CALC-SCNC: 10 MMOL/L
AST SERPL-CCNC: 20 U/L
BASOPHILS # BLD AUTO: 0 K/UL
BASOPHILS NFR BLD: 0.3 %
BILIRUB SERPL-MCNC: 0.3 MG/DL
BUN SERPL-MCNC: 11 MG/DL
CALCIUM SERPL-MCNC: 9.5 MG/DL
CHLORIDE SERPL-SCNC: 102 MMOL/L
CO2 SERPL-SCNC: 27 MMOL/L
CREAT SERPL-MCNC: 1 MG/DL
DIFFERENTIAL METHOD: ABNORMAL
EOSINOPHIL # BLD AUTO: 0.1 K/UL
EOSINOPHIL NFR BLD: 0.8 %
ERYTHROCYTE [DISTWIDTH] IN BLOOD BY AUTOMATED COUNT: 14.3 %
EST. GFR  (AFRICAN AMERICAN): >60 ML/MIN/1.73 M^2
EST. GFR  (NON AFRICAN AMERICAN): >60 ML/MIN/1.73 M^2
GLUCOSE SERPL-MCNC: 110 MG/DL
HCT VFR BLD AUTO: 39 %
HGB BLD-MCNC: 12.8 G/DL
LYMPHOCYTES # BLD AUTO: 4.2 K/UL
LYMPHOCYTES NFR BLD: 42.5 %
MAGNESIUM SERPL-MCNC: 2.3 MG/DL
MCH RBC QN AUTO: 29 PG
MCHC RBC AUTO-ENTMCNC: 32.9 %
MCV RBC AUTO: 88 FL
MONOCYTES # BLD AUTO: 0.8 K/UL
MONOCYTES NFR BLD: 8.2 %
NEUTROPHILS # BLD AUTO: 4.7 K/UL
NEUTROPHILS NFR BLD: 48.2 %
PHOSPHATE SERPL-MCNC: 3.4 MG/DL
PLATELET # BLD AUTO: 198 K/UL
PMV BLD AUTO: 7.5 FL
POTASSIUM SERPL-SCNC: 4 MMOL/L
PROT SERPL-MCNC: 7.3 G/DL
RBC # BLD AUTO: 4.42 M/UL
SODIUM SERPL-SCNC: 139 MMOL/L
WBC # BLD AUTO: 9.8 K/UL

## 2017-07-04 PROCEDURE — G0378 HOSPITAL OBSERVATION PER HR: HCPCS

## 2017-07-04 PROCEDURE — 25000003 PHARM REV CODE 250: Performed by: NURSE PRACTITIONER

## 2017-07-04 PROCEDURE — 83735 ASSAY OF MAGNESIUM: CPT

## 2017-07-04 PROCEDURE — 36415 COLL VENOUS BLD VENIPUNCTURE: CPT

## 2017-07-04 PROCEDURE — 80053 COMPREHEN METABOLIC PANEL: CPT

## 2017-07-04 PROCEDURE — 84100 ASSAY OF PHOSPHORUS: CPT

## 2017-07-04 PROCEDURE — 85025 COMPLETE CBC W/AUTO DIFF WBC: CPT

## 2017-07-04 RX ORDER — LISINOPRIL 20 MG/1
20 TABLET ORAL DAILY
Qty: 30 TABLET | Refills: 0 | Status: SHIPPED | OUTPATIENT
Start: 2017-07-04 | End: 2017-07-12 | Stop reason: SDUPTHER

## 2017-07-04 RX ORDER — CLONIDINE HYDROCHLORIDE 0.1 MG/1
0.1 TABLET ORAL 3 TIMES DAILY
Qty: 90 TABLET | Refills: 0 | Status: SHIPPED | OUTPATIENT
Start: 2017-07-04 | End: 2017-09-25 | Stop reason: SDUPTHER

## 2017-07-04 RX ORDER — LABETALOL 100 MG/1
100 TABLET, FILM COATED ORAL 2 TIMES DAILY
Qty: 180 TABLET | Refills: 1 | Status: SHIPPED | OUTPATIENT
Start: 2017-07-04 | End: 2018-04-10 | Stop reason: SDUPTHER

## 2017-07-04 RX ORDER — ZOLPIDEM TARTRATE 10 MG/1
10 TABLET ORAL NIGHTLY
Qty: 30 TABLET | Refills: 0 | Status: SHIPPED | OUTPATIENT
Start: 2017-07-04

## 2017-07-04 RX ADMIN — CLONIDINE HYDROCHLORIDE 0.1 MG: 0.1 TABLET ORAL at 05:07

## 2017-07-04 RX ADMIN — FLUOXETINE 40 MG: 20 CAPSULE ORAL at 08:07

## 2017-07-04 RX ADMIN — IBUPROFEN 400 MG: 400 TABLET ORAL at 04:07

## 2017-07-04 RX ADMIN — LABETALOL HCL 100 MG: 100 TABLET, FILM COATED ORAL at 08:07

## 2017-07-04 RX ADMIN — PRASUGREL HYDROCHLORIDE 10 MG: 5 TABLET, FILM COATED ORAL at 08:07

## 2017-07-04 RX ADMIN — LISINOPRIL 5 MG: 2.5 TABLET ORAL at 08:07

## 2017-07-04 RX ADMIN — BRIMONIDINE TARTRATE 1 DROP: 1.5 SOLUTION OPHTHALMIC at 08:07

## 2017-07-04 RX ADMIN — PANTOPRAZOLE SODIUM 40 MG: 40 TABLET, DELAYED RELEASE ORAL at 08:07

## 2017-07-04 RX ADMIN — ASPIRIN 81 MG: 81 TABLET, COATED ORAL at 08:07

## 2017-07-04 NOTE — NURSING
Per Lorraine Arteaga NP pt is not currently taking Suboxone at home and will not order medication. Pt aware.

## 2017-07-04 NOTE — PROGRESS NOTES
"Pt voices frustration with hospital staff for refusing to give an extra pain medication. Pt states " I will have a stroke with my blood pressure being this high" I stated we are given medication regarding his blood pressure. Pt states " It doesn't always help". I advised that monitoring of blood pressure it within his plan of care.   "

## 2017-07-04 NOTE — PLAN OF CARE
07/04/17 0925   Discharge Assessment   Assessment Type Discharge Planning Reassessment   CM met with patient at bedside to inform about MAZARIEGOS letter and had him sign. CM also asked patient if he had  transportation home.  Patient stated that 2 different nurses, names he could not recall , had told him that the hospital would pay for a cab. CM reminded patient about prior conversation regard transportation home during discharge assessment yesterday at which time CM informed patient that he would be responsible for paying for a taxi home or calling a friend or his daughter to bring him home. CM reminded him that the hospital does not pay for transportation home. Patient called his daughter and left her a message. CM informed patient to call her again to see if he could reach her for ride home. Patient denies having anyone else to call. CM will followup with patient if he does not find transportation on his own.     930am CM was informed by Angela, floor nurse that patient did not want CM to come back into the room.     1035am Patient did call taxi that he paid for and was discharged.

## 2017-07-04 NOTE — PLAN OF CARE
Problem: Patient Care Overview  Goal: Plan of Care Review  Outcome: Ongoing (interventions implemented as appropriate)   07/04/17 0945   Coping/Psychosocial   Plan Of Care Reviewed With patient   Pt denies pain at this time. NAD noted. POC reviewed w pt and they verbalized understanding. Tele-  SR   Pt free from falls, Pt ambulates to the bathroom. Bed in low position, side rails up x 2. Call light in reach,  and wheels locked.

## 2017-07-04 NOTE — PLAN OF CARE
Problem: Patient Care Overview  Goal: Individualization & Mutuality  Outcome: Ongoing (interventions implemented as appropriate)  Alert/oriented  Ambulates with a cane  Many complaints about staff not helping his withdrawal needs. All concerns addressed and discussed in detail with patient.  Denies abdominal pain/discomfort. Refusing to take Lactulose. Patient educated on medication and purpose  Safe

## 2017-07-04 NOTE — NURSING
Lorraine Arteaga NP notified of patient request via dr almonte. Will continue to monitor for orders.

## 2017-07-04 NOTE — HOSPITAL COURSE
Patient underwent CT scan abdomen demonstrating internal hernia left upper quadrant of the abdomen with dilated small bowel and mesentery swirling. Personal review with Dr. Melchor was consistent with finding of large amount of stool colon. He was given laxatives orally and rectally with positive results. He is feeling better after bowel movement with resolution of abdominal discomfort. Appetite is good.   XR abdomen this am is negative for acute findings.     PE: chest CTA. Abdomen: soft NT

## 2017-07-04 NOTE — DISCHARGE SUMMARY
"[facility]  Hospital Medicine  Discharge Summary      Patient Name: Benny Murray  MRN: [id number]  Admission Date: 7/2/2017  Hospital Length of Stay: 0 days  Discharge Date and Time: 7/4/2017 10:29 AM  Attending Physician: Joceline att. providers found   Discharging Provider: Nata Morgan NP  Primary Care Provider: Valentino Dickson MD      HPI:   Benny Murray is a 66 y.o. Male with PMHx significant for CAD, anemia, depression, anxiety, and HTN.  He was admitted for observation for LUQ/epigastric pain.  He reported to the ED with compaint of abdominal pain to his LUQ/epigastrum.  The pain began abruptly yesterday approximately 2 hours after he ate breakfast.  He states while eating breakfast he had a hard time swallowing because he felt like he had "a basketball deflate in his abdomen."  He noticed significant abdominal distension and was unable to belch.  Two hours later he began with severe, sharp LUQ abdominal pain that worsened when sitting or standing up and resolved while laying flat. He tried drinking a [employer] root beer to induce belching without effect.  Jesenia seltzer and gas-x were also ineffective. The pain radiates to the epigastrum.  He denies any diarrhea or constipation and reports a normal BM Saturday.  No black, tarry, or bloody stool.  He denies any nausea or vomiting.  He denies fever, chills, chest pain, of SOB.  He was recently seen for similar type pain to RUQ and was diagnosed with hepatitis (originally thought to be acalculous cholecystitis. He has not followed up with hepatology.  Other pertinent medical history as below:    * No surgery found *      Indwelling Lines/Drains at time of discharge:   Lines/Drains/Airways          No matching active lines, drains, or airways        Hospital Course:   Patient underwent CT scan abdomen demonstrating internal hernia left upper quadrant of the abdomen with dilated small bowel and mesentery swirling. Personal review with Dr." Hill was consistent with finding of large amount of stool colon. He was given laxatives orally and rectally with positive results. He is feeling better after bowel movement with resolution of abdominal discomfort. Appetite is good.   XR abdomen this am is negative for acute findings.     PE: chest CTA. Abdomen: soft NT     Consults:     Significant Diagnostic Studies: Labs:   BMP:   Recent Labs  Lab 07/02/17 2215 07/03/17  0422 07/04/17  0436   * 101 110   * 139 139   K 3.9 4.1 4.0   CL 99 103 102   CO2 22* 25 27   BUN 12 10 11   CREATININE 1.0 0.9 1.0   CALCIUM 10.0 9.4 9.5   MG  --  1.9 2.3    and CMP   Recent Labs  Lab 07/02/17 2215 07/03/17  0422 07/04/17  0436   * 139 139   K 3.9 4.1 4.0   CL 99 103 102   CO2 22* 25 27   * 101 110   BUN 12 10 11   CREATININE 1.0 0.9 1.0   CALCIUM 10.0 9.4 9.5   PROT 8.2 7.1 7.3   ALBUMIN 4.1 3.6 3.6   BILITOT 0.5 0.5 0.3   ALKPHOS 156* 135 131   AST 23 19 20   ALT 20 16 15   ANIONGAP 13 11 10   ESTGFRAFRICA >60 >60 >60   EGFRNONAA >60 >60 >60     Radiology: X-Ray: KUB: X-Ray Abdomen AP 1 View (KUB):   Results for orders placed or performed during the hospital encounter of 07/02/17   X-Ray Abdomen AP 1 View    Narrative    The images blurred due to patient motion.  The patient has had a prior kyphoplasty at L1 and L2.  The bowel gas pattern is unremarkable.  No free air is seen.  No masses are identified.  The patient has had prior right hip replacement.    Impression     Prior right hip replacement and kyphoplasty of L1 and L2 otherwise negative limited abdomen x-ray secondary to blurring and motion      Electronically signed by: Sohail Galvez MD  Date:     07/04/17  Time:    08:30      CT scan: CT ABDOMEN PELVIS WITHOUT CONTRAST: No results found for this visit on 07/02/17.    Pending Diagnostic Studies:     None        Final Active Diagnoses:    Diagnosis Date Noted POA    PRINCIPAL PROBLEM:  Left upper quadrant pain [R10.12] 07/03/2017 Yes     Essential hypertension [I10] 06/02/2017 Yes    Coronary artery disease involving native coronary artery of native heart without angina pectoris [I25.10] 05/23/2017 Yes    Recurrent major depressive disorder [F33.9] 03/08/2013 Yes    Glaucoma suspect [H40.009] 06/08/2012 Yes      Problems Resolved During this Admission:    Diagnosis Date Noted Date Resolved POA      No new Assessment & Plan notes have been filed under this hospital service since the last note was generated.  Service: Hospital Medicine      Discharged Condition: stable    Disposition: Home or Self Care    Follow Up:  Follow-up Information     Valentino Dickson MD In 1 week.    Specialties:  Family Medicine, Internal Medicine  Why:  hospital follow up  Contact information:  [address] [address] [id number] [phone]                 Patient Instructions:     Diet general   Order Specific Question Answer Comments   Additional restrictions: High Fiber      Activity as tolerated     Call MD for:  severe uncontrolled pain     Call MD for:  persistent nausea and vomiting or diarrhea       Medications:  Reconciled Home Medications:   Discharge Medication List as of 7/4/2017  9:58 AM      START taking these medications    Details   polyethylene glycol (GLYCOLAX) 17 gram PwPk Take 1 g by mouth once daily at 6am., Starting Mon 7/3/2017, Print         CONTINUE these medications which have CHANGED    Details   cloNIDine (CATAPRES) 0.1 MG tablet Take 1 tablet (0.1 mg total) by mouth 3 (three) times daily., Starting Tue 7/4/2017, Until Wed 7/4/2018, Print      labetalol (NORMODYNE) 100 MG tablet Take 1 tablet (100 mg total) by mouth 2 (two) times daily., Starting Tue 7/4/2017, Print      lisinopril (PRINIVIL,ZESTRIL) 20 MG tablet Take 1 tablet (20 mg total) by mouth once daily., Starting Tue 7/4/2017, Until Thu 8/3/2017, Print      zolpidem (AMBIEN) 10 mg Tab Take 1 tablet (10 mg total) by mouth every evening. OUT OF MEDICATION, Starting Tue 7/4/2017, Print     "     CONTINUE these medications which have NOT CHANGED    Details   aspirin (ECOTRIN) 81 MG EC tablet Take 81 mg by mouth once daily. , Until Discontinued, Historical Med      BD LUER-RADHA SYRINGE 3 mL 23 x 1 1/2" Syrg Starting 5/8/2013, Until Discontinued, Historical Med      brimonidine 0.15 % OPTH DROP (ALPHAGAN) 0.15 % ophthalmic solution INSTILL 1 DROP INTO EACH EYE TWICE A DAY, Normal      EFFIENT 10 mg Tab Take 1 tablet by mouth once daily., Starting 4/28/2017, Until Discontinued, Historical Med      fluoxetine (PROZAC) 40 MG capsule Take 1 capsule (40 mg total) by mouth once daily., Starting 4/25/2017, Until Discontinued, Normal      latanoprost 0.005 % ophthalmic solution PLACE 1 DROP INTO THE RIGHT EYE EVERY EVENING, Normal      MELATONIN ORAL Take 10 mg by mouth nightly., Historical Med      pantoprazole (PROTONIX) 40 MG tablet Take 1 tablet by mouth once daily., Starting 4/28/2017, Until Discontinued, Historical Med      polyethylene glycol (GLYCOLAX) 17 gram/dose powder TAKE 17 GRAMS MIXED IN LIQUID ONCE DAILY, Normal      TIZANIDINE HCL (ZANAFLEX ORAL) Take 2 mg by mouth once daily. , Historical Med      verapamil (CALAN-SR) 240 MG CR tablet TAKE 1 TABLET BY MOUTH ONCE q hs, Normal      nitroGLYCERIN (NITROSTAT) 0.4 MG SL tablet Place 0.4 mg under the tongue every 5 (five) minutes as needed for Chest pain., Historical Med         STOP taking these medications       trazodone (DESYREL) 50 MG tablet Comments:   Reason for Stopping:         atorvastatin (LIPITOR) 40 MG tablet Comments:   Reason for Stopping:         buprenorphine HCl (SUBUTEX) 2 mg Subl Comments:   Reason for Stopping:         saw palmetto 500 MG capsule Comments:   Reason for Stopping:             Time spent on the discharge of patient: 39 minutes    Nata Morgan NP  Department of Hospital Medicine  [facility]  "

## 2017-07-04 NOTE — NURSING
Discharge instructions given to pt. Instructed on medicine regimen and f/u appts. Pt verbalizes understanding. IV and Telemetry removed. Escorted to car via wheelchair per staff.

## 2017-07-04 NOTE — NURSING
"Pt c/o headache 5/10 on pain scale. Pt states that "I don't know why no one is treating me for the withdrawals, everyone I mention it to look at me as a junkie and   I am scared that when I go home I will have a stroke like my father did." Ibuprofen and Zanaflex was given to patient for pain. Pt also advised that I will notify  Lorraine Arteaga NP of request for withdrawal control .   "

## 2017-07-04 NOTE — NURSING
"Pt states " I have not been using the urinal" I advised that measuring intake and output needs to be recorded to please use the urinal. Pt verbalize understanding.   "

## 2017-07-04 NOTE — NURSING
Observed Radiology call from previous nurse. Awaiting KUB. Discussed with patient. Patient does not have transportation nor money for taxi. Will notify house supervisor.

## 2017-07-04 NOTE — PLAN OF CARE
07/04/17 1037   MAZARIEGOS Message   Medicare Outpatient and Observation Notification regarding financial responsibility Given to patient/caregiver;Explained to patient/caregiver;Signed/date by patient/caregiver   Date MAZARIEGOS was signed 07/04/17   Time MAZARIEGOS was signed 0925

## 2017-07-05 ENCOUNTER — TELEPHONE (OUTPATIENT)
Dept: MEDSURG UNIT | Facility: HOSPITAL | Age: 67
End: 2017-07-05

## 2017-07-05 PROCEDURE — 99495 TRANSJ CARE MGMT MOD F2F 14D: CPT | Mod: S$GLB,,, | Performed by: NURSE PRACTITIONER

## 2017-07-07 ENCOUNTER — OUTPATIENT CASE MANAGEMENT (OUTPATIENT)
Dept: ADMINISTRATIVE | Facility: OTHER | Age: 67
End: 2017-07-07

## 2017-07-07 ENCOUNTER — TELEPHONE (OUTPATIENT)
Dept: FAMILY MEDICINE | Facility: CLINIC | Age: 67
End: 2017-07-07

## 2017-07-07 NOTE — PROGRESS NOTES
7/7/17-RN CM called patient for follow up. Patient has a reported hospital admit for abdominal pain. Reported that he was having some gas pains during our encounter. Reported to RN CM that his BP continues to fluctuate. Stated that this morning his BP reading was 51/42 and at 2pm it was 87/56 with his pulse rate 70. RN CM sent message PCP with readings and further advice to patient. Patient reported that earlier he was feeling dizzy and did not get up to walk because he was worried about falls. Again discussed with patient the importance of monitoring his BP, and writing down his readings. Verbalized Understanding. Also encouraged patient to bring log to next visit along with any other written questions he has for his provider. Verbalized understanding. Patient is aware of upcoming appointment. Will follow up in 2 weeks.         Follow Up PLan:  -complete PHQ9 with next encounter--Done 6/27  -received resource information?---YES  --review Blood Pressure readings  ---review signs and symptoms of MI      Short Term Goals  Patient/caregiver will verbalize 3 signs and symptoms of Myocardial Infarction within 1 month.   Interventions   · Recognize and provide educational material (CAYETANO).   Status  · Partially met

## 2017-07-07 NOTE — TELEPHONE ENCOUNTER
----- Message from Issa Soriano RN sent at 7/7/2017  3:16 PM CDT -----  Contact: Issa Soriano RN Outpatient Case Management  Good Afternoon,     I recently spoke with the patient. He reported some low blood pressure readings today. Stated that this morning his BP was 51/42 and at 2pm his BP reading was 87/56. Can you please contact the patient for further advice?        Thank you,    Issa Soriano RN   Outpatient Case Management

## 2017-07-07 NOTE — TELEPHONE ENCOUNTER
Spoke with patient.  B/p now 136/89 after a few hours.  Asymptomatic.  Drinking plenty of fluids.  Has follow up Wednesday.  Instructed if he is feeling light headed, dizzy, or faint to go to ED and keep follow up appointment.  Call if reoccurs.

## 2017-07-08 ENCOUNTER — OUTPATIENT CASE MANAGEMENT (OUTPATIENT)
Dept: ADMINISTRATIVE | Facility: OTHER | Age: 67
End: 2017-07-08

## 2017-07-08 NOTE — LETTER
July 8, 2017    Benny Murray  [address]  [address]  [facility] [address]             Outpatient Case Management  [address]  [facility] [address] Dear Benny Murray:    We understand that receiving many services from different doctors and healthcare providers is overwhelming. There are appointments to make, transportation to arrange, dietary instructions to understand, and new medications to obtain.    This is where [facility] can help.     You are eligible to receive Outpatient Case Management services when you have healthcare needs that require the coordination of many providers, treatments, and services. You also qualify if you need assistance with a new treatment plan.     There is no charge for this support. You may have been referred to this program from your doctor(s), hospital staff member(s), or insurance company but you always have a choice to participate or not participate. To participate, you must give us your permission to be enrolled.     When you are enrolled in the [facility] program, the  who is assigned to you is    Issa Soriano, RN  [phone]    Depending on your needs and wishes, your  may speak with you by phone, visit you at your place of living (for example your home, skilled nursing facility, or rehabilitation facility), or meet you at your doctors office.     Your  will tell you why you have been selected to participate in the program and will complete an assessment of your needs. Then a personalized plan of care will be developed with you and or your caregiver.             Here are examples of the services your [facility]  provides.     Coordinate communication among multiple providers.   Arrange for transportation, doctors visits, durable medical equipment, home care services, and special clinics.    Provide coaching on how to manage your health condition.     Answer questions about your health condition.   Help you understand your doctors treatment plan.    Provide additional instruction about your health condition, treatments, and medications.    Help you obtain information about your insurance coverage.    Advocate for your individual needs.    Request a Licensed Clinical  (LCSW) to visit you if you need their services. LCSWs help with long term planning (discussing placement options, advanced planning directives), financial planning, and assistance (for example rent, utilities, medication funding).     Your  will coordinate their activities with other outpatient services you are receiving. All services provided by [facility]  are coordinated with and communicated to your primary care physician.    Our goal is to help you manage your health condition(s) safely within your living environment, whether that is your home or a medical facility. We want to help you function at the healthiest and highest level possible.     Sincerely,      Boone Duff MD  Medical Director    Enclosures:    Frequently Asked Questions  Patient Rights and Responsibilities   Reporting a Grievance or Complaint  Consent/Release of Information  Stamped Addressed Envelope                  Frequently Asked Questions about [facility]    What is [facility]?  Outpatient Case management is not Home healthcare services. [facility]  do not provide hands-on care. [facility]  will work with your doctor to arrange for home health services, if needed. Home health services have a limited duration and there are some restrictions as to who can get these services. There is no prescribed limit to the amount of time you receive [facility] services. [facility]  are not agents of your insurance company. However, [facility]  can help you obtain information from your insurance company.     Who are the [facility] ?  [facility]  are Registered Nurses and Social Workers. It is important to remember that you and your  are a team that works together with your primary care physician to create your individualized plan of care. The ultimate goal of your care plan is to help you implement your doctors treatment plan and to help you function at the highest level of health possible.     What are my rights as a patient?  It is important for you to know and understand your rights and responsibilities while receiving services from the [facility] program. We have enclosed a complete description of your rights and responsibilities. You can help to make your care more effective when you understand your right and responsibilities.     What is needed to be enrolled in the program?  You are only enrolled in the [facility] Program when you give us your consent to participate. You will find enclosed a consent form. You are receiving this letter because you or your caregivers have given us a verbal consent to enroll you in [facility] Program. We ask that you sign and return the enclosed written consent in the stamped self-addressed envelope.                           Patient Rights and Responsibilities    We consider you a partner in your care. When you are well informed, participate in treatment decisions and communicate openly with your doctor and other healthcare professionals, you help make your care more effective.     While you are in the Outpatient Case Management Program, your rights include the following:     You have a right to be provided services in a non-discriminatory manner in accordance with the provisions of Title VI of the Civil Rights Act of 1964, Section 504 of the Rehabilitation Act of  1973, the Age Discrimination Act of 1975, the Americans with Disabilities Act as well as any other applicable Federal and State laws and regulations.     You have the right to a reasonable, timely response to your request or need for care, as well as the right to considerate and respectful care including an environment that preserves dignity and contributes to a positive self-image. You are responsible for being considerate and respectful of our staff.     You have a right to information regarding patient rights, advocacy services and complaint mechanisms, and the right to prompt resolution of any complaint. You or a designee has the right to participate in the resolution of ethical issues surrounding your care. You have a right to file a complaint if you feel that your rights have been infringed, without fear or penalty from [facility] or the federal government. You may file a complaint with the Director of Outpatient Case Management by calling [phone]. At any time, you may lodge a grievance with the [facility] by calling [phone], or the Joint Commission on Accreditation of Healthcare Organizations at [phone].     You, or someone acting on your behalf, have the right to understandable information on your health status, treatment and progress in order to make decisions. You have the right to know the nature, risks and alternatives to treatment. You have the right to be informed, when appropriate, regarding the outcome of the care that has been provided. You have the right to refuse treatment to the extent permitted by law, and the right to be informed of the alternatives and consequences of refusing treatment.     You, in collaboration with your physician, have the right to make decisions regarding care and the right to participate in the development and implementation of the plan of care and effective pain management. You have the right to know the name and  professional status of those responsible for the delivery of your care and treatment.       You have a right, within legal guidelines, to have a guardian, next-of-kin or legal designee exercises your patient rights when you are unable to do so. You have the right for your wishes regarding end-of-life decisions to be addressed by the healthcare team through advance directives. You have the right to personal privacy and confidentiality and to expect confidentiality of all records and communications pertaining to your care.      You have the right to receive communications about your health information confidentially. You have the right to request restrictions on the uses and disclosures of your health information. You have the right to inspect, copy, request amendments and receive an accounting of to whom we have disclosed your health information.     You have the right to be provided with interpretation services if you do not speak English; to alternative communication techniques if you are hearing or vision impaired; and to have any other resources needed on your behalf to ensure effective communication. These services are provided free of charge.     You have a right to personal safety (free from mental, physical, sexual and verbal abuse, neglect and exploitation). You have the right to access protective and advocacy services.     Advance Directives  A Patient Advocate is available to meet with patients to answer questions regarding advance directives.    Living Will  A document that outlines what medical treatment the patient does or does not want in the event the patient becomes unable to make those decisions at the appropriate time.    Durable Medical Power of   A document by which the patient designates an individual to be responsible for making medical decisions in the event the patient becomes unable to do so.    HIPAA Notice of Privacy Practices  Your medical information is governed by federal  privacy laws. HIPAA protects private medical information and how that information is disclosed. If you have a question regarding the HIPAA Notice of Privacy Practices, or if you believe your privacy rights have been violated, you may call our designated hotline at [phone].            Quality Improvement  Because we consistently strive to improve the care and service provided to our patients, we welcome your feedback. Your comments are an important part of our quality improvement process, as we like to know what we are doing right and which areas are in need of improvement. Our policy is to listen, be responsive and provide you with an appropriate and timely follow-up to your questions or concerns. Our goal is active patient and family involvement in all aspects of the care process.        Reporting a Grievance or Complaint    During your time with the [facility] team you may have a grievance or complaint with our services. Your Patients Bill of Rights gives patients, families, and caregivers the right to express concerns and grievances and the right to expect a reasonable and timely response.     Your presentation of your concerns is not viewed negatively. It is an opportunity for us to improve the quality of our care and services we provide to you.     You may report your concerns directly to your , or you can phone in a complaint to:     Director of Outpatient Case Management  [phone]    You may also send a complaint letter to:    Director of Outpatient Case Management Services  [address] [place] [id number]    Tell us the details of your complaint and provide us with a contact phone number so we can contact you to obtain additional information. We will return a call to you within two business days of our receipt of your complaint, and to request additional information as needed. If you choose to mail a letter, your complaint may take a few days  longer to reach us.     All grievances will be addressed as quickly as possible. A grievance or complaint that involves situations or practices which place patients in immediate danger will be addressed as an urgent matter. We will work to resolve all other complaints within seven days of receipt. By that time, you will receive a phone call with either the resolution of your complaint, or a plan for corrective action. A formal written response will be sent to you within 30 days of receipt of your grievance.     If a resolution cannot be completed within 30 days, a letter will be sent to you or your family member with an estimated time for the final response.    Additionally, all patients have the right to file complaints with external agencies, without exception. Complaints/grievances can be addressed to the following agencies:            Patient Safety or Quality of Care Concerns  Office of Quality Monitoring   [employer] [facility] [place] [address]  [phone] Toll Free    HIPPA Privacy/Security Concerns  Office for Civil Rights Region IV  [place] Department of Health & Human Services  [address] [place] [address], [address]  [place] [address] [phone] Phone  [phone] TDD  [phone] Toll Free    Medicare/Medicaid Billing Concerns  [place] for Medicare & Medicaid Services  Region [address]  [address] [place] [address], [address]  [place] [address] [phone] Phone  [phone] Toll Free    General Concerns  [facility]  [phone] Toll Free Complaint Hotline                                        Consent Form/Release of Information    By signing--     (1) I agree I have read the Outpatient Case Management information provided to me;     (2) I agree to voluntarily participate in the Outpatient Case Management program;     (3) I understand I must consent to participation in the Outpatient Case Management program during my first interview  with my ;    (4) I consent to the discussion and release of my personal health information to my healthcare team (including my personal physician, my medical home care team, any specialty physician(s), and my [facility] team);     (5) I agree my consent is valid for the length of time I am receiving Outpatient Case Management;    (6) I agree to referrals to community resources which my Case Management team recommends for me. I agree to the release of my personal information and personal health information as necessary to referral sources.    ___________________________________________________________________  Patients Printed Name     ___________________________________________________________________  Patients Signature       Date    If patient is in being cared for, please complete this section:     ___________________________________________________________________  Printed Name of Person Caring For Patient   Relationship To Patient    ___________________________________________________________________   Signature of Person Caring For Patient     Date    PLEASE SIGN AND RETURN IN THE ENCLOSED PRE-ADDRESSED ENVELOPE.

## 2017-07-08 NOTE — PROGRESS NOTES
Please note an Outpatient Complex Care Management welcome packet and consent form was created and mailed to the patient on 7/7/17.    Please contact [facility] at [phone] with any questions.    Thank you,    Khushi Ward, SSC

## 2017-07-11 ENCOUNTER — OUTPATIENT CASE MANAGEMENT (OUTPATIENT)
Dept: ADMINISTRATIVE | Facility: OTHER | Age: 67
End: 2017-07-11

## 2017-07-11 NOTE — PROGRESS NOTES
7/11/2017:   spoke to patient via telephone for follow up.  He reported that he is doing well and feels that his withdrawal symptoms are improving.  He reported that he has not received the mailings that I sent him.  He has an appointment tomorrow and will take a cab for transportation.  Will continue to follow.    Plan for next encounter:  1. Ensure that patient received mailings  2. Answer questions about the programs  3. Encourage him to contact desired agencies  4. Assess for any additional needs.

## 2017-07-12 ENCOUNTER — DOCUMENTATION ONLY (OUTPATIENT)
Dept: FAMILY MEDICINE | Facility: CLINIC | Age: 67
End: 2017-07-12

## 2017-07-12 ENCOUNTER — TELEPHONE (OUTPATIENT)
Dept: FAMILY MEDICINE | Facility: CLINIC | Age: 67
End: 2017-07-12

## 2017-07-12 ENCOUNTER — APPOINTMENT (OUTPATIENT)
Dept: LAB | Facility: HOSPITAL | Age: 67
End: 2017-07-12
Attending: NURSE PRACTITIONER
Payer: MEDICARE

## 2017-07-12 ENCOUNTER — OFFICE VISIT (OUTPATIENT)
Dept: FAMILY MEDICINE | Facility: CLINIC | Age: 67
End: 2017-07-12
Payer: MEDICARE

## 2017-07-12 VITALS
HEART RATE: 74 BPM | BODY MASS INDEX: 27.49 KG/M2 | DIASTOLIC BLOOD PRESSURE: 82 MMHG | TEMPERATURE: 98 F | SYSTOLIC BLOOD PRESSURE: 113 MMHG | RESPIRATION RATE: 16 BRPM | OXYGEN SATURATION: 94 % | HEIGHT: 69 IN | WEIGHT: 185.63 LBS

## 2017-07-12 DIAGNOSIS — R10.84 GENERALIZED ABDOMINAL PAIN: ICD-10-CM

## 2017-07-12 DIAGNOSIS — F33.9 EPISODE OF RECURRENT MAJOR DEPRESSIVE DISORDER, UNSPECIFIED DEPRESSION EPISODE SEVERITY: ICD-10-CM

## 2017-07-12 DIAGNOSIS — R53.83 FATIGUE, UNSPECIFIED TYPE: ICD-10-CM

## 2017-07-12 DIAGNOSIS — F51.01 PRIMARY INSOMNIA: ICD-10-CM

## 2017-07-12 DIAGNOSIS — I10 ESSENTIAL HYPERTENSION: Primary | ICD-10-CM

## 2017-07-12 LAB
ALBUMIN SERPL BCP-MCNC: 4 G/DL
ALP SERPL-CCNC: 106 U/L
ALT SERPL W/O P-5'-P-CCNC: 17 U/L
ANION GAP SERPL CALC-SCNC: 12 MMOL/L
AST SERPL-CCNC: 23 U/L
BILIRUB SERPL-MCNC: 0.4 MG/DL
BUN SERPL-MCNC: 15 MG/DL
CALCIUM SERPL-MCNC: 10.3 MG/DL
CHLORIDE SERPL-SCNC: 100 MMOL/L
CO2 SERPL-SCNC: 27 MMOL/L
CREAT SERPL-MCNC: 1 MG/DL
EST. GFR  (AFRICAN AMERICAN): >60 ML/MIN/1.73 M^2
EST. GFR  (NON AFRICAN AMERICAN): >60 ML/MIN/1.73 M^2
GLUCOSE SERPL-MCNC: 107 MG/DL
POTASSIUM SERPL-SCNC: 4.1 MMOL/L
PROT SERPL-MCNC: 7.8 G/DL
SODIUM SERPL-SCNC: 139 MMOL/L
TESTOST SERPL-MCNC: 63 NG/DL

## 2017-07-12 PROCEDURE — 80053 COMPREHEN METABOLIC PANEL: CPT

## 2017-07-12 PROCEDURE — 84403 ASSAY OF TOTAL TESTOSTERONE: CPT

## 2017-07-12 PROCEDURE — 99214 OFFICE O/P EST MOD 30 MIN: CPT | Mod: S$GLB,,, | Performed by: NURSE PRACTITIONER

## 2017-07-12 RX ORDER — LISINOPRIL 20 MG/1
20 TABLET ORAL DAILY
Qty: 90 TABLET | Refills: 3 | Status: SHIPPED | OUTPATIENT
Start: 2017-07-12 | End: 2018-06-15 | Stop reason: SDUPTHER

## 2017-07-12 RX ORDER — FLUOXETINE HYDROCHLORIDE 40 MG/1
40 CAPSULE ORAL DAILY
Qty: 90 CAPSULE | Refills: 3 | Status: SHIPPED | OUTPATIENT
Start: 2017-07-12 | End: 2017-10-11 | Stop reason: SDUPTHER

## 2017-07-12 RX ORDER — TIZANIDINE 4 MG/1
TABLET ORAL
COMMUNITY
Start: 2017-07-11 | End: 2017-10-11 | Stop reason: SDUPTHER

## 2017-07-12 RX ORDER — DOXEPIN HYDROCHLORIDE 10 MG/1
10 CAPSULE ORAL NIGHTLY
Qty: 30 CAPSULE | Refills: 11 | Status: SHIPPED | OUTPATIENT
Start: 2017-07-12 | End: 2018-07-12

## 2017-07-12 NOTE — MEDICAL/APP STUDENT
Subjective:       Patient ID: Benny Murray is a 66 y.o. male.    Chief Complaint: Follow-up    Anemia   Presents for follow-up visit. Symptoms include bruises/bleeds easily and pallor. There has been no confusion, fever, leg swelling, light-headedness or palpitations. Signs of blood loss that are not present include melena. Treatments tried: multi vitamin. Past medical history includes recent illness. There is no history of alcohol abuse. (MI May 2017) There is no past history of colonoscopy or EGD. Family history includes iron deficiency (Mother took Iron supplement). Compliance problems include medication cost.  Compliance with medications is %.     Review of Systems   Constitutional: Negative for fever.   HENT: Negative for congestion and ear pain.    Respiratory: Negative for apnea, cough, shortness of breath and wheezing.    Cardiovascular: Negative for chest pain, palpitations and leg swelling.   Gastrointestinal: Negative for abdominal distention, constipation, diarrhea, melena, nausea and vomiting.   Endocrine: Positive for cold intolerance.   Genitourinary: Negative for difficulty urinating and dysuria.   Musculoskeletal: Positive for myalgias.   Skin: Positive for color change and pallor.        Skin pale   Neurological: Negative for dizziness, light-headedness, numbness and headaches.   Hematological: Bruises/bleeds easily.        Takes ASA for headaches 325 mg twice weekly, pt states headaches due to elevated blood pressure   Psychiatric/Behavioral: Positive for dysphoric mood and sleep disturbance. Negative for agitation, confusion, self-injury and suicidal ideas.        State limited sleep each night 2-2.5 hours.       Objective:      Physical Exam   Constitutional: He is oriented to person, place, and time. He appears well-developed and well-nourished.   HENT:   Head: Normocephalic and atraumatic.   Eyes: Pupils are equal, round, and reactive to light.   Neck: Normal range of motion. Neck  supple. No thyromegaly present.   Cardiovascular: Normal heart sounds and intact distal pulses.    Pulmonary/Chest: Effort normal and breath sounds normal. He has no wheezes. He exhibits no tenderness.   Abdominal: Soft. Bowel sounds are normal. He exhibits no distension. There is no tenderness. There is no rebound and no guarding.   Musculoskeletal: Normal range of motion.   Neurological: He is alert and oriented to person, place, and time. He has normal reflexes.   Skin: Skin is warm and dry. Capillary refill takes less than 2 seconds. There is pallor.   Psychiatric: His speech is normal and behavior is normal. Judgment and thought content normal. Cognition and memory are normal. He exhibits a depressed mood.       Assessment:   Iron Deficiency Anemia  Plan:   Iron supplement  Taper off Protonix, decrease dose to 20 mg day  Refill pr

## 2017-07-12 NOTE — TELEPHONE ENCOUNTER
----- Message from Nadine Dawkins sent at 7/12/2017  3:32 PM CDT -----  Contact: Benny Zapata forgot to discuss his blood pressure medication Clonidine. He wants to let her know he filled it as a safety measure. .thanks!

## 2017-07-12 NOTE — PROGRESS NOTES
Medical/NARDA Student  Encounter Date: 7/12/2017  Sharmin Kevin        Subjective:       Patient ID: Benny Murray is a 66 y.o. male.     Chief Complaint: Follow-up     Anemia   Presents for follow-up visit. Symptoms include bruises/bleeds easily and pallor. There has been no confusion, fever, leg swelling, light-headedness or palpitations. Signs of blood loss that are not present include melena. Treatments tried: multi vitamin. Past medical history includes recent illness. There is no history of alcohol abuse. (MI May 2017) There is no past history of colonoscopy or EGD. Has HIDA scan which appeared positive, but was thought to have reoccurance of hepatic issues at the time.  Family history includes iron deficiency (Mother took Iron supplement). Compliance problems include medication cost.  Compliance with medications is %.      Believes he is in fentenyl withdrawal, blood pressure varying wildly, not sleeping. Wants ambien filled.   Review of Systems   Constitutional: Negative for fever.   HENT: Negative for congestion and ear pain.    Respiratory: Negative for apnea, cough, shortness of breath and wheezing.    Cardiovascular: Negative for chest pain, palpitations and leg swelling.   Gastrointestinal: Negative for abdominal distention, constipation, diarrhea, melena, nausea and vomiting.   Endocrine: Positive for cold intolerance.   Genitourinary: Negative for difficulty urinating and dysuria.   Musculoskeletal: Positive for myalgias.   Skin: Positive for color change and pallor.        Skin pale   Neurological: Negative for dizziness, light-headedness, numbness and headaches.   Hematological: Bruises/bleeds easily.        Takes ASA for headaches 325 mg twice weekly, pt states headaches due to elevated blood pressure   Psychiatric/Behavioral: Positive for dysphoric mood and sleep disturbance. Negative for agitation, confusion, self-injury and suicidal ideas.        State limited sleep each night 2-2.5  hours.     Answers for HPI/ROS submitted by the patient on 7/11/2017   activity change: Yes  unexpected weight change: Yes  neck pain: No  hearing loss: No  rhinorrhea: No  trouble swallowing: No  eye discharge: No  visual disturbance: No  chest tightness: No  wheezing: No  chest pain: No  palpitations: No  blood in stool: No  constipation: No  vomiting: No  diarrhea: Yes  polydipsia: No  polyuria: No  difficulty urinating: No  urgency: No  hematuria: No  joint swelling: No  arthralgias: No  headaches: No  weakness: Yes  confusion: No  dysphoric mood: Yes    Objective:      CMP  Sodium   Date Value Ref Range Status   07/04/2017 139 136 - 145 mmol/L Final     Potassium   Date Value Ref Range Status   07/04/2017 4.0 3.5 - 5.1 mmol/L Final     Chloride   Date Value Ref Range Status   07/04/2017 102 95 - 110 mmol/L Final     CO2   Date Value Ref Range Status   07/04/2017 27 23 - 29 mmol/L Final     Glucose   Date Value Ref Range Status   07/04/2017 110 70 - 110 mg/dL Final     BUN, Bld   Date Value Ref Range Status   07/04/2017 11 8 - 23 mg/dL Final     Creatinine   Date Value Ref Range Status   07/04/2017 1.0 0.5 - 1.4 mg/dL Final   02/05/2013 1.2 0.5 - 1.4 mg/dL Final     Calcium   Date Value Ref Range Status   07/04/2017 9.5 8.7 - 10.5 mg/dL Final   02/05/2013 10.5 8.7 - 10.5 mg/dL Final     Total Protein   Date Value Ref Range Status   07/04/2017 7.3 6.0 - 8.4 g/dL Final     Albumin   Date Value Ref Range Status   07/04/2017 3.6 3.5 - 5.2 g/dL Final     Total Bilirubin   Date Value Ref Range Status   07/04/2017 0.3 0.1 - 1.0 mg/dL Final     Comment:     For infants and newborns, interpretation of results should be based  on gestational age, weight and in agreement with clinical  observations.  Premature Infant recommended reference ranges:  Up to 24 hours.............<8.0 mg/dL  Up to 48 hours............<12.0 mg/dL  3-5 days..................<15.0 mg/dL  6-29 days.................<15.0 mg/dL       Alkaline  Phosphatase   Date Value Ref Range Status   07/04/2017 131 55 - 135 U/L Final   02/05/2013 56 55 - 135 U/L Final     AST   Date Value Ref Range Status   07/04/2017 20 10 - 40 U/L Final   02/05/2013 47 (H) 10 - 40 U/L Final     ALT   Date Value Ref Range Status   07/04/2017 15 10 - 44 U/L Final     Anion Gap   Date Value Ref Range Status   07/04/2017 10 8 - 16 mmol/L Final   02/05/2013 9 5 - 15 meq/L Final     eGFR if    Date Value Ref Range Status   07/04/2017 >60 >60 mL/min/1.73 m^2 Final     eGFR if non    Date Value Ref Range Status   07/04/2017 >60 >60 mL/min/1.73 m^2 Final     Comment:     Calculation used to obtain the estimated glomerular filtration  rate (eGFR) is the CKD-EPI equation. Since race is unknown   in our information system, the eGFR values for   -American and Non--American patients are given   for each creatinine result.         Physical Exam   Constitutional: He is oriented to person, place, and time. He appears well-developed and well-nourished.   HENT:   Head: Normocephalic and atraumatic.   Eyes: Pupils are equal, round, and reactive to light.   Neck: Normal range of motion. Neck supple. No thyromegaly present.   Cardiovascular: Normal heart sounds and intact distal pulses.    Pulmonary/Chest: Effort normal and breath sounds normal. He has no wheezes. He exhibits no tenderness.   Abdominal: Soft. Bowel sounds are normal. He exhibits no distension. There is no tenderness. There is no rebound and no guarding.   Musculoskeletal: Normal range of motion.   Neurological: He is alert and oriented to person, place, and time. He has normal reflexes.   Skin: Skin is warm and dry. Capillary refill takes less than 2 seconds. There is pallor.   Psychiatric: His speech is normal and behavior is normal. Judgment and thought content normal. Cognition and memory are normal. He exhibits a depressed mood.       Assessment:   Iron Deficiency Anemia  Plan:   Essential  hypertension  -     lisinopril (PRINIVIL,ZESTRIL) 20 MG tablet; Take 1 tablet (20 mg total) by mouth once daily.  Dispense: 90 tablet; Refill: 3    Episode of recurrent major depressive disorder, unspecified depression episode severity  -     fluoxetine (PROZAC) 40 MG capsule; Take 1 capsule (40 mg total) by mouth once daily.  Dispense: 90 capsule; Refill: 3    Primary insomnia  -     doxepin (SINEQUAN) 10 MG capsule; Take 1 capsule (10 mg total) by mouth every evening.  Dispense: 30 capsule; Refill: 11    Generalized abdominal pain  -     Ambulatory referral to Gastroenterology        gissell supplement  Taper off Protonix, decrease dose to 20 mg day  Refill prozac      Electronically signed by Sharmin Brown at 7/12/2017 11:25 AM

## 2017-07-13 NOTE — TELEPHONE ENCOUNTER
----- Message from Darren Perea sent at 7/13/2017 11:17 AM CDT -----  Pt is calling because the medication Doxipin that he was prescribed yestersday is not working for him(pt has not slept in over 72 hours)  Call Back#[phone]  Thanks

## 2017-07-18 ENCOUNTER — PATIENT MESSAGE (OUTPATIENT)
Dept: FAMILY MEDICINE | Facility: CLINIC | Age: 67
End: 2017-07-18

## 2017-07-18 DIAGNOSIS — I10 ESSENTIAL HYPERTENSION: Primary | ICD-10-CM

## 2017-07-18 RX ORDER — HYDROCHLOROTHIAZIDE 12.5 MG/1
12.5 CAPSULE ORAL DAILY
Qty: 30 CAPSULE | Refills: 11 | Status: SHIPPED | OUTPATIENT
Start: 2017-07-18 | End: 2018-05-15 | Stop reason: SDUPTHER

## 2017-07-21 ENCOUNTER — OUTPATIENT CASE MANAGEMENT (OUTPATIENT)
Dept: ADMINISTRATIVE | Facility: OTHER | Age: 67
End: 2017-07-21

## 2017-07-21 ENCOUNTER — PATIENT MESSAGE (OUTPATIENT)
Dept: FAMILY MEDICINE | Facility: CLINIC | Age: 67
End: 2017-07-21

## 2017-07-21 NOTE — PROGRESS NOTES
7/21/17-RN CM called patient for follow up. Patient reported that he is doing much better since our last encounter. Patient continues to monitor his blood pressure several times throughout the day. Patient has even started to email PCP with blood pressure readings for review. Denies any low blood pressure readings. Reported that he is now taking iron supplements because he found out he was anemic. Reported that he feels better since starting to the iron. Will follow up in 3 weeks.         Follow Up PLan:  -complete PHQ9 with next encounter--Done 6/27  -received resource information?---YES  --review Blood Pressure readings  ---review signs and symptoms of MI  --discuss cardiac diet      Patient/caregiver will verbalize 5 food items Cardiac within 2 months.  Interventions   · Recognize and provide educational material (CAYETANO).  · Encourage Dietary Compliance.  · Review eating/nutrition habits.   Status  · Deferred      Short Term Goals  Patient/caregiver will verbalize 3 signs and symptoms of Myocardial Infarction within 1 month.   Interventions   · Recognize and provide educational material (CAYETANO).   Status  · Partially met

## 2017-07-25 ENCOUNTER — OUTPATIENT CASE MANAGEMENT (OUTPATIENT)
Dept: ADMINISTRATIVE | Facility: OTHER | Age: 67
End: 2017-07-25

## 2017-07-25 NOTE — PROGRESS NOTES
"7/25/2017:   spoke to patient who reported that he has received the information that I mailed to him.  He stated that he does not want to contact the food bank because his food costs are only about $200 a month and he doesn't want to take away from someone else.  He is not interested in [employer] transportation because their brochure states it can run 30-45 minutes late.  He has used [employer] and [employer] for transportation in the past and it is more expensive than a cab.  Pt wants to continue using cab services for transportation at this time.  He also stated that he is "feeling much better" and even shaved his beard so he "won't look like an old man anymore".  He denied any additional needs at this time.  Will change to monitoring status.  Encouraged patient to call  if needs arise before our next encounter.        Plan for next encounter:  1. Assess for any additional needs.  2. Close case if no needs noted.      "

## 2017-07-28 ENCOUNTER — TELEPHONE (OUTPATIENT)
Dept: PHARMACY | Facility: CLINIC | Age: 67
End: 2017-07-28

## 2017-07-28 NOTE — TELEPHONE ENCOUNTER
QoL Assessment completed - pt completed Harvoni x 8 weeks. . Confirmed 2 patient identifiers - name and . SVR12 labs drawn on 17. HCV RNA not detected, <12 at EOT and SVR12. SVR24 scheduled for 17. Stressed importance of keeping SVR24 labs and appt to ensure virus does not return. Due to Rx completion, [facility] will discharge him from our services but he is welcome to contact us should he have any questions.TTN

## 2017-07-29 ENCOUNTER — PATIENT MESSAGE (OUTPATIENT)
Dept: FAMILY MEDICINE | Facility: CLINIC | Age: 67
End: 2017-07-29

## 2017-08-02 ENCOUNTER — PATIENT MESSAGE (OUTPATIENT)
Dept: FAMILY MEDICINE | Facility: CLINIC | Age: 67
End: 2017-08-02

## 2017-08-03 DIAGNOSIS — R79.89 LOW TESTOSTERONE IN MALE: Primary | ICD-10-CM

## 2017-08-04 ENCOUNTER — PATIENT MESSAGE (OUTPATIENT)
Dept: GASTROENTEROLOGY | Facility: CLINIC | Age: 67
End: 2017-08-04

## 2017-08-11 ENCOUNTER — OUTPATIENT CASE MANAGEMENT (OUTPATIENT)
Dept: ADMINISTRATIVE | Facility: OTHER | Age: 67
End: 2017-08-11

## 2017-08-11 NOTE — PROGRESS NOTES
8/11/17-RN CM called patient for follow up. Patient reported that he is doing well. Stated that his blood pressure readings are less erratic. Reported that the highest reading is running in the 130s/90s.  Patient continues to monitor his blood pressure several times throughout the day. Patient denies any additional needs at this time. RN CM needs met. Encouraged patient to contact [facility] RN in the event that needs develop. Verbalized understanding. Will continue to assist with facilitation and coordination of care.

## 2017-08-14 ENCOUNTER — PATIENT MESSAGE (OUTPATIENT)
Dept: UROLOGY | Facility: CLINIC | Age: 67
End: 2017-08-14

## 2017-08-14 ENCOUNTER — OFFICE VISIT (OUTPATIENT)
Dept: UROLOGY | Facility: CLINIC | Age: 67
End: 2017-08-14
Payer: MEDICARE

## 2017-08-14 VITALS
HEIGHT: 69 IN | HEART RATE: 98 BPM | BODY MASS INDEX: 28.57 KG/M2 | WEIGHT: 192.88 LBS | RESPIRATION RATE: 18 BRPM | TEMPERATURE: 98 F | SYSTOLIC BLOOD PRESSURE: 132 MMHG | DIASTOLIC BLOOD PRESSURE: 90 MMHG

## 2017-08-14 DIAGNOSIS — N50.89 LUMP IN THE TESTICLE: ICD-10-CM

## 2017-08-14 DIAGNOSIS — R79.89 LOW TESTOSTERONE IN MALE: Primary | ICD-10-CM

## 2017-08-14 DIAGNOSIS — N52.01 ERECTILE DYSFUNCTION DUE TO ARTERIAL INSUFFICIENCY: ICD-10-CM

## 2017-08-14 LAB
BILIRUB SERPL-MCNC: NORMAL MG/DL
BLOOD URINE, POC: NORMAL
COLOR, POC UA: YELLOW
GLUCOSE UR QL STRIP: NORMAL
KETONES UR QL STRIP: NORMAL
LEUKOCYTE ESTERASE URINE, POC: NORMAL
NITRITE, POC UA: NORMAL
PH, POC UA: 6
PROTEIN, POC: NORMAL
SPECIFIC GRAVITY, POC UA: 1.01
UROBILINOGEN, POC UA: NORMAL

## 2017-08-14 PROCEDURE — 3075F SYST BP GE 130 - 139MM HG: CPT | Mod: S$GLB,,, | Performed by: NURSE PRACTITIONER

## 2017-08-14 PROCEDURE — 3080F DIAST BP >= 90 MM HG: CPT | Mod: S$GLB,,, | Performed by: NURSE PRACTITIONER

## 2017-08-14 PROCEDURE — 99214 OFFICE O/P EST MOD 30 MIN: CPT | Mod: 25,S$GLB,, | Performed by: NURSE PRACTITIONER

## 2017-08-14 PROCEDURE — 3008F BODY MASS INDEX DOCD: CPT | Mod: S$GLB,,, | Performed by: NURSE PRACTITIONER

## 2017-08-14 PROCEDURE — 99999 PR PBB SHADOW E&M-EST. PATIENT-LVL V: CPT | Mod: PBBFAC,,, | Performed by: NURSE PRACTITIONER

## 2017-08-14 PROCEDURE — 81001 URINALYSIS AUTO W/SCOPE: CPT | Mod: S$GLB,,, | Performed by: NURSE PRACTITIONER

## 2017-08-14 PROCEDURE — 51798 US URINE CAPACITY MEASURE: CPT | Mod: S$GLB,,, | Performed by: NURSE PRACTITIONER

## 2017-08-14 RX ORDER — TRAZODONE HYDROCHLORIDE 50 MG/1
TABLET ORAL
Refills: 11 | COMMUNITY
Start: 2017-07-21 | End: 2018-03-14

## 2017-08-14 NOTE — LETTER
August 14, 2017      Laila Watt, APRN  [address]  [facility] [address]           [facility]  [address]  [facility] [address]  Phone: [phone]          Patient: Benny Murray   MR Number: [id number]   YOB: 1950   Date of Visit: 8/14/2017       Dear Laila Watt:    Thank you for referring Benny Murray to me for evaluation. Attached you will find relevant portions of my assessment and plan of care.    If you have questions, please do not hesitate to call me. I look forward to following Benny Murray along with you.    Sincerely,    Sharon Poole, [facility]    Enclosure  CC:  No Recipients    If you would like to receive this communication electronically, please contact [email or web address] or [phone] to request more information on [facility] access.    For providers and/or their staff who would like to refer a patient to [facility], please contact us through our one-stop-shop provider referral line, [facility], at [phone].    If you feel you have received this communication in error or would no longer like to receive these types of communications, please e-mail [email or web address]

## 2017-08-14 NOTE — PATIENT INSTRUCTIONS
Total Testosterone  Does this test have other names?  Testosterone (total), serum testosterone  What is this test?  This test measures the level of the hormone testosterone in your blood. Testosterone is a male sex hormone (androgen) that helps male features develop. Testosterone is made in the testes and the adrenal glands. It causes the changes that occur in boys during puberty. Testosterone helps hair and muscles to grow. It also helps the penis and testes to grow. Testosterone also causes a boy's voice to deepen. Men continue to make testosterone. In adults it boosts sex drive and helps make sperm.  Women's ovaries also make small amounts of testosterone. It helps many organs and body processes in women.  The pituitary gland in your brain regulates the amount of testosterone your body makes.   Most of the testosterone in your blood attaches to two proteins: albumin and sex hormone binding globulin (SHBG). Some testosterone is not attached to proteins, or free. Free testosterone and albumin-bound testosterone are also referred to as bioavailable testosterone. This is the testosterone that is easily used by your body.  If your healthcare provider suspects that you have low or high testosterone, he or she will first test total testosterone levels. This looks at all three parts of testosterone. The free testosterone can help give more information when total testosterone is low.  Both men and women can have health problems because of low or high levels of testosterone. Women with high levels of testosterone may have polycystic ovary syndrome (PCOS). This condition marked by infertility, lack of menstruation, acne, obesity, blood sugar problems, and extra hair growth, especially on the face.  Testosterone levels in men drop as they age, but this is not considered to be hypogonadism. The FDA currently recommends against treating men with low testosterone caused only by aging.  Why do I need this test?  You may need  this test if you have symptoms of low testosterone.  Symptoms of low testosterone in men include:  · Large breasts  · Low sex drive or lack of interest in sex  · Difficulty getting an erection  · Low sperm count and other fertility problems  · Changes in the testicles  · Weak bones  · Irritability  · Difficulty concentrating  · Loss of muscle mass  · Hair loss  · Depression  · Fatigue  · Anemia  Symptoms of low testosterone in women include:  · Fertility problems  · Missed or irregular menstrual periods  · Osteoporosis  · Low sex drive  · Changes in breast tissue  · Vaginal dryness   What other tests might I have along with this test?  Your healthcare provider may order other blood tests to check hormone levels. These include:  · Follicle-stimulating hormone (FSH) test  · Luteinizing hormone (LH) test  · Thyroid stimulating hormone (TSH) test  You may also need to have:  · Biopsy of the testicles  · Imaging test, such as an MRI  · Semen analysis  · Tests of the pituitary gland   What do my test results mean?  Many things may affect your lab test results. These include the method each lab uses to do the test. Even if your test results are different from the normal value, you may not have a problem. To learn what the results mean for you, talk with your healthcare provider.  The results of this test are given in nanograms per deciliter (ng/dL). Normal test results show total testosterone levels of:  · 280 to 1,100 ng/dL for men  · 15 to 70 ng/dL for women  If your testosterone levels are lower than normal, you may have a condition that affects your testosterone production. If your testosterone levels are higher than normal, you may have a tumor on the testes or ovaries that affects your testosterone production.   How is this test done?  The test requires a blood sample, which is drawn through a needle from a vein in your arm. This test is usually done in the morning, because testosterone levels tend to be highest at  that time. But you may need to have this test more than once, and at different times of the day, to confirm low testosterone levels. This is because your testosterone level can change from morning to evening and from day to day.  Does this test pose any risks?  Taking a blood sample with a needle carries risks that include bleeding, infection, bruising, or feeling dizzy. When the needle pricks your arm, you may feel a slight stinging sensation or pain. Afterward, the site may be slightly sore.   What might affect my test results?  Some medicines may affect your test results. These include antifungal medicines such as ketoconazole and hormone medicines. Having the test done late in the day may show that your testosterone level is lower than it really is.  How do I get ready for this test?  You don't need to prepare for this test. But be sure your healthcare provider knows about all medicines, herbs, vitamins, and supplements you are taking. This includes medicines that don't need a prescription and any illicit drugs you may use.   Date Last Reviewed: 10/30/2015  © 6928-5783 [employer]. [address] [place] [address]. All rights reserved. This information is not intended as a substitute for professional medical care. Always follow your healthcare professional's instructions.        What Is Peyronie Disease?  A slight natural curve to the erect penis is usually normal. But curvature that causes pain and difficulty with intercourse is a problem. The development of painful curvature is called Peyronie disease. Peyronie disease is due to a plaque (scar) that forms inside the penis.      Your Penile Anatomy  The shaft (body) of the penis consists of the corpora cavernosa, 2 columns of spongy tissue. During an erection, this tissue fills with blood, swells, and becomes rigid, creating an erection. A dense sheath of elastic tissue called the tunica surrounds the corpora. This tissue stretches as the  penis becomes erect.  Painful Curvature  Peyronie disease occurs when a plaque (scar) develops on the fibrous sheath of tissue surrounding the corpora. The scar can form on any part of the penis, but often is found on the top or bottom. The scarred area of the tunica loses its elasticity, so it doesnt stretch when the corpora swells. Because the tunica doesnt stretch in that area, the erect penis curves in the direction of the scar.  Possible Causes  No one is sure just what causes the plaque. It may be the result of an injury to the erect penis or a blow to the groin. The plaque may occur because of a problem with your immune system. One thing is certain, however, that Peyronie disease is not caused by sexually transmitted diseases and is not cancer.   Symptoms of Peyronie Disease  · Curvature of the penis during erection (which may interfere with intercourse)  · Pain during erection  · Soft erections  · Shortening or narrowing of the penis  A hard area usually felt below the skin of the penis in the area of the plaque.  Date Last Reviewed: 9/18/2014 © 2000-2016 [employer]. [address] [place] [address]. All rights reserved. This information is not intended as a substitute for professional medical care. Always follow your healthcare professional's instructions.        Treating Peyronie Disease  Peyronie disease occurs when the penis curves during an erection. This is most often due to a plaque (scar) that forms inside the penis. In some men, the plaque shrinks and disappears on its own, without treatment. If treatment is needed, the main goal is to relieve pain and make the penis straight enough for sex. There are different kinds of treatment. The success of these different treatments vary from man to man.  Medication  Medication is usually tried for 1 year to 2 years before other treatments are done. For some men, the disease will go away during this time. Medication may help reduce  pain. It may also help soften and shrink the plaque in the penis. Some medications may be taken by mouth. And some may be rubbed right on the penis. Others may be injected into the plaque. Medications that treat erectile dysfunction may help with some of the problems of Peyronie disease. But they will not treat the curvature or pain. Your doctor will discuss all your options and possible side effects with you.  Surgery  Surgery is used in cases that cant be treated by other means. It may also be done for a severe curve in the penis. It may also be done for severe pain that does not stop. Options for surgery include:  · An incision in the plaque to release tension. Part of the plaque is removed and replaced with a graft.  · Making the penis shorter. This is done on the opposite side of the plaque. It can cancel out the curve.  · Implanting of a device (prosthesis). This can straighten the penis and make it rigid enough for sex.  Your doctor can discuss the risks and benefits of these treatments with you. Be sure to ask questions. Consider all of your options before you choose surgery.  Peyronie disease is hard to cure. Counseling may help you cope with the effects of the disease. It may help you and your partner find ways to deal with it.   Date Last Reviewed: 9/18/2014 © 2000-2016 [employer], [employer]. [address] [place] [address], [place] [address]. All rights reserved. This information is not intended as a substitute for professional medical care. Always follow your healthcare professional's instructions.

## 2017-08-14 NOTE — PROGRESS NOTES
"[facility] Note  Staff: MEME BoydP-C      Chief Complaint: Right testicular lump, low testosterone levels    Subjective:        HPI: Benny Murray is a 66 y.o. male new patient presents with "2" issues today in Urology: (1) Right testicle lump and (2) Low testosterone levels.    Right sided testicle lump:  Pt states today he initially noticed area of concern 2-3 months ago.  There has been no increase in size or pain.  It intermittently is sensitive, sometimes just with movement or sedentary.  He wears jocky briefs.  He does not use bicycles/or any cycles due to back fracture many years ago.    Low Testosterone Levels:  Nocturia 3-5x nightly  Libido: Yes, for last 5 years  Fatigue: None  Erectile dysfunction: 5 years, pt is poor historian he just briefly mentioned to me that he saw a Urologist many years ago and was diagnosed with Peyronie's disease.  He tried Viagra in the past, but did not work for him at all.    Previous testosterone treatment: He has had testosterone injections in his past which was several years ago and were stopped abruptly but he is unable to remember why?    Planning on having any more children: None    Gross Hematuria:No  STDs in past: Yes - Genital Herpes at 20  Vasectomy: No    REVIEW OF SYSTEMS:  Review of Systems   Constitutional: Negative for chills, diaphoresis, fever and weight loss.   HENT: Negative for congestion, hearing loss, nosebleeds and sore throat.    Eyes: Negative for blurred vision and pain.   Respiratory: Negative for cough and wheezing.    Cardiovascular: Negative for chest pain, palpitations and leg swelling.   Gastrointestinal: Negative for abdominal pain, heartburn, nausea and vomiting.   Genitourinary: Negative for dysuria, flank pain, frequency, hematuria and urgency.        +Lump in right testicle.  +Low testosterone level   Musculoskeletal: Negative for back pain, joint pain, myalgias and neck pain.   Skin: Negative for itching " "and rash.   Neurological: Negative for dizziness, tremors, sensory change, seizures, loss of consciousness, weakness and headaches.   Endo/Heme/Allergies: Does not bruise/bleed easily.   Psychiatric/Behavioral: Negative for depression and suicidal ideas. The patient is not nervous/anxious.      Physical Exam    PMHx:  Past Medical History:   Diagnosis Date    Anemia     Anxiety     Arthritis     Blood transfusion     x4    Cancer BASAL CELL CHEST - NEW DX    Cataract     ou done    Clotting disorder     Coronary artery disease     Depression     Heart abnormalities     Hypertension     Myocardial infarction 10 YRS AGO    silent    Osteoporosis     Wears glasses      Kidney stones: Yes   Cataracts? None    PSHx:  Past Surgical History:   Procedure Laterality Date    APPENDECTOMY      Avastin os #2  8-    BACK SURGERY  2003    CATARACT EXTRACTION      os 12-13-12 od d 5-15-13//    EYE SURGERY  LEFT CATARACT 1/13    SPINE SURGERY      lumbar spine     Stents/Valves/Foreign Bodies: Yes - 2 cardiac stents , multiple heart attacks, 1/3 heart has muscle death.  April 2017 last "silent heart attacks".  Cardiac Evaluation: Does not have a cardiologist.    Screening Studies  Colonoscopy: Never had test.    Fam Hx:   malignancies: No    kidney stones: No     Soc Hx:  No tobacco use    Allergies:  Review of patient's allergies indicates no known allergies.    Medications: reviewed   Anticoagulation: Yes - Effient 10 mg   Ecotrin 81 mg one tablet daily    Objective:     Vitals:    08/14/17 1036   BP: (!) 132/90   Pulse: 98   Resp: 18   Temp: 98 °F (36.7 °C)     General:WDWN in NAD  Eyes: PERRLA, normal conjunctiva  Respiratory: no increased work on breathing, clear to auscultation  Cardiovascular: regular rate and rhythm. No obvious extremity edema.  GI: palpation of masses. No tenderness. No hepatosplenomegaly to palpation.  Musculoskeletal: normal range of motion of bilateral upper extremities. " Normal muscle strength and tone.  Skin: no obvious rashes or lesions. No tightening of skin noted.  Neurologic: CN grossly normal. Normal sensation.   Psychiatric: awake, alert and oriented x 3. Mood and affect normal. Cooperative.    :  Inspection of anus and perineum normal  No scrotal rashes, cysts or lesions  Epididymis normal in size, no tenderness  Testes atrophic, I was unable to feel lump that pt described.  When he tried to find it, he was unable to also.  Urethral meatus normal without discharge  Penis is circumcised,    BRIGIDO: only able to feel a portion of prostate-from what I could feel, the gland is without masses, tenderness. SV not palpable. Normal sphincter tone. No hemhorroids.    PVR by bladder scan performed by me today: 24 mL*    LABS REVIEW:  UA today:  Color:Clear, Yellow  Spec. Grav.  1.010  PH  6.0  Negative for leukocytes, nitrates, protein, glucose, ketones, urobili, bili, and blood.    UCx: No results found for this or any previous visit.  Cr:   Lab Results   Component Value Date    CREATININE 1.0 07/12/2017     Testosterone:   Lab Results   Component Value Date    TESTOSTERONE 0.5 08/19/2015     Assessment:       1. Low testosterone in male    2. Lump in the testicle    3. Erectile dysfunction due to arterial insufficiency           Plan:   Low Testosterone Levels:  IF candidate for testosterone treatment he will need  -testosterone panel (done before 9am) *last tests were done after noon.  -lipid panel    *This patient has significant heart conditions and currently does NOT have a cardiologist following him.  I would like one of the Urologists to review pt's information and make the ultimate decision if he is a good candidate for testosterone replacement therapy at this time.    Lump in right testicle:  -We will obtain an ultrasound for further evaluation of symptoms.  We could not locate area he was speaking of during examination today.    F/u with Urologist to make decision whether pt  is candidate for testosterone replacement therapy due to medical history.    [facility]: Active.    Sharon Poole, PRICEC

## 2017-08-18 ENCOUNTER — EPISODE CHANGES (OUTPATIENT)
Dept: HEPATOLOGY | Facility: CLINIC | Age: 67
End: 2017-08-18

## 2017-08-18 ENCOUNTER — PATIENT MESSAGE (OUTPATIENT)
Dept: HEPATOLOGY | Facility: CLINIC | Age: 67
End: 2017-08-18

## 2017-08-25 ENCOUNTER — OUTPATIENT CASE MANAGEMENT (OUTPATIENT)
Dept: ADMINISTRATIVE | Facility: OTHER | Age: 67
End: 2017-08-25

## 2017-08-25 NOTE — PROGRESS NOTES
8/25/17-RN CM completed chart review. OPCM LCSW continues to assist with needs. RN CM will continue to assist with facilitation and coordination of care.

## 2017-08-28 ENCOUNTER — OUTPATIENT CASE MANAGEMENT (OUTPATIENT)
Dept: ADMINISTRATIVE | Facility: OTHER | Age: 67
End: 2017-08-28

## 2017-08-28 NOTE — PROGRESS NOTES
"8/28/2017:   left a message on patient's voicemail in an attempt to follow up.  Will try again at a later date if patient does not return my call.          7/25/2017:   spoke to patient who reported that he has received the information that I mailed to him.  He stated that he does not want to contact the food bank because his food costs are only about $200 a month and he doesn't want to take away from someone else.  He is not interested in [employer] transportation because their brochure states it can run 30-45 minutes late.  He has used [employer] and [employer] for transportation in the past and it is more expensive than a cab.  Pt wants to continue using cab services for transportation at this time.  He also stated that he is "feeling much better" and even shaved his beard so he "won't look like an old man anymore".  He denied any additional needs at this time.  Will change to monitoring status.  Encouraged patient to call  if needs arise before our next encounter.        Plan for next encounter:  1. Assess for any additional needs.  2. Close case if no needs noted.      "

## 2017-09-01 ENCOUNTER — OUTPATIENT CASE MANAGEMENT (OUTPATIENT)
Dept: ADMINISTRATIVE | Facility: OTHER | Age: 67
End: 2017-09-01

## 2017-09-03 NOTE — ASSESSMENT & PLAN NOTE
Unclear etiology.    Improving.    HIDA pending, surgery consulted as well as GI consult    Potentially hepatotoxic drugs on hold. Statin induced vs localized inflammation from cholecystitis (less likely clinically), viral.  Awaiting viral analysis, prior viral RNA quantification from April negative for HCV.  He did have his statin changed as an outpatient, hold on discharge until seen by Dr. Lamb.  Serology for AIH also obtained and is pending.

## 2017-09-03 NOTE — PROGRESS NOTES
[facility] Medicine  Progress Note    Patient Name: Benny Murray  MRN: [id number]  Patient Class: IP- Inpatient   Admission Date: 6/2/2017  Length of Stay: 3 days  Attending Physician: No att. providers found  Primary Care Provider: Valentino Dickson MD        Subjective:     Principal Problem:Acute acalculous cholecystitis    HPI:  Mr. Murray presents for evaluation of RIGHT upper abdominal pain which has been present for a couple of days. He describes the pain as a dull sensation which is constant with periods of worsening. He reports some relief with Gas-X and standing. He denies radiation of the pain. He reports it is worse with pressing his RIGHT upper abdomen. He denies fever, chills. He denies vomiting, diarrhea, constipation. He reports feeling abdominal bloating. He denies trauma. He denies chest pain, shortness of breath. He reports poor appetite.     He was evaluated in the ED. He was found to have significant tenderness to palpation of his RIGHT upper abdomen. An abdominal ultrasound reveals findings suspicious of acalculous cholecystitis.    Hospital Course:  6/3 patient admitted on supportive care. Transaminitis of significant value. Gi and surgery consulted, abdominal ultrasound showing distended gallbladder but bile duct not significantly distended and gallbladder wall not thickened and no SIRS.  6/4 HIDA positive, but clinically not consistent with acalculous bear.  Transaminitis improved significantly. Laboratory serology obtained for etiology of underlying transaminitis.  6/5 Transaminitis continued to resolve, pain resolved and tolerating diet.  Patient discharged home and statin held given possibility of medication induced hepatitis and follow up to be arranged with Dr. Lamb.    Interval History: HIDA pending, unclear cause of symptoms and lab work thus far, surgery consulted    Review of Systems   Constitutional: Negative for fatigue and fever.   Respiratory:  Negative for cough, chest tightness and shortness of breath.    Cardiovascular: Negative for chest pain, palpitations and leg swelling.   Gastrointestinal: Positive for abdominal pain and nausea. Negative for abdominal distention, constipation, diarrhea and vomiting.   Genitourinary: Negative for difficulty urinating and dysuria.   Skin: Negative for rash and wound.   Neurological: Negative for dizziness and numbness.     Objective:   Vital signs reviewed:    BP range 110//83  92 to 94% on room air  Pulse 70-81  T 97.9 to 98    Physical Exam   Constitutional: He is oriented to person, place, and time. He appears well-developed and well-nourished. No distress.   HENT:   Head: Normocephalic and atraumatic.   Eyes: Conjunctivae and EOM are normal. Pupils are equal, round, and reactive to light. No scleral icterus.   Neck: Normal range of motion. No JVD present.   Cardiovascular: Normal rate, regular rhythm and normal heart sounds.    No murmur heard.  Pulmonary/Chest: Effort normal and breath sounds normal. No respiratory distress.   Abdominal: Soft. Bowel sounds are normal. He exhibits no distension. There is tenderness. There is no rebound and no guarding.   Musculoskeletal: Normal range of motion. He exhibits no edema.   Neurological: He is alert and oriented to person, place, and time. No cranial nerve deficit.   Skin: Skin is warm and dry. Capillary refill takes less than 2 seconds. No rash noted.   Psychiatric: He has a normal mood and affect. His behavior is normal.   Nursing note and vitals reviewed.      Significant Labs: All pertinent labs within the past 24 hours have been reviewed.    Significant Imaging: I have reviewed all pertinent imaging results/findings within the past 24 hours.    Assessment/Plan:      Acute hepatitis    Unclear etiology.    Improving.    HIDA pending, surgery consulted as well as GI consult    Potentially hepatotoxic drugs on hold. Statin induced vs localized inflammation from  cholecystitis (less likely clinically), viral.  Awaiting viral analysis, prior viral RNA quantification from April negative for HCV.  He did have his statin changed as an outpatient, hold on discharge until seen by Dr. Lamb.  Serology for AIH also obtained and is pending.          RUQ pain    Possibly due to acute hepatitis, resolved presently        Essential hypertension    Continue Clonidine, Lisinopril        Coronary artery disease involving native coronary artery of native heart without angina pectoris    Continue Aspirin, Atorvastatin held  Monitor for signs and symptoms of ACS          Recurrent major depressive disorder    Continue Fluoxetine          Glaucoma suspect    Continue ophthalmics            VTE Risk Mitigation         Ordered     Medium Risk of VTE  Once      06/02/17 9900              David Vargas MD  Department of Hospital Medicine   [facility]

## 2017-09-03 NOTE — SUBJECTIVE & OBJECTIVE
Interval History: HIDA pending, unclear cause of symptoms and lab work thus far, surgery consulted    Review of Systems   Constitutional: Negative for fatigue and fever.   Respiratory: Negative for cough, chest tightness and shortness of breath.    Cardiovascular: Negative for chest pain, palpitations and leg swelling.   Gastrointestinal: Positive for abdominal pain and nausea. Negative for abdominal distention, constipation, diarrhea and vomiting.   Genitourinary: Negative for difficulty urinating and dysuria.   Skin: Negative for rash and wound.   Neurological: Negative for dizziness and numbness.     Objective:   Vital signs reviewed:    BP range 110//83  92 to 94% on room air  Pulse 70-81  T 97.9 to 98    Physical Exam   Constitutional: He is oriented to person, place, and time. He appears well-developed and well-nourished. No distress.   HENT:   Head: Normocephalic and atraumatic.   Eyes: Conjunctivae and EOM are normal. Pupils are equal, round, and reactive to light. No scleral icterus.   Neck: Normal range of motion. No JVD present.   Cardiovascular: Normal rate, regular rhythm and normal heart sounds.    No murmur heard.  Pulmonary/Chest: Effort normal and breath sounds normal. No respiratory distress.   Abdominal: Soft. Bowel sounds are normal. He exhibits no distension. There is tenderness. There is no rebound and no guarding.   Musculoskeletal: Normal range of motion. He exhibits no edema.   Neurological: He is alert and oriented to person, place, and time. No cranial nerve deficit.   Skin: Skin is warm and dry. Capillary refill takes less than 2 seconds. No rash noted.   Psychiatric: He has a normal mood and affect. His behavior is normal.   Nursing note and vitals reviewed.      Significant Labs: All pertinent labs within the past 24 hours have been reviewed.    Significant Imaging: I have reviewed all pertinent imaging results/findings within the past 24 hours.

## 2017-09-05 ENCOUNTER — OUTPATIENT CASE MANAGEMENT (OUTPATIENT)
Dept: ADMINISTRATIVE | Facility: OTHER | Age: 67
End: 2017-09-05

## 2017-09-05 NOTE — PROGRESS NOTES
9/5/17-Chart review completed. OPCM LCSW closed case as needs have been met. RN CM will close case at this time as nursing needs have been met as well. Patient has contact information for OPCM RN in the event that needs develop.

## 2017-09-11 ENCOUNTER — PATIENT MESSAGE (OUTPATIENT)
Dept: HEPATOLOGY | Facility: CLINIC | Age: 67
End: 2017-09-11

## 2017-09-14 ENCOUNTER — OUTPATIENT CASE MANAGEMENT (OUTPATIENT)
Dept: ADMINISTRATIVE | Facility: OTHER | Age: 67
End: 2017-09-14

## 2017-09-14 NOTE — PROGRESS NOTES
Please note this patient was mailed an Outpatient Case Management Patient Satisfaction Discharge Survey on 9/14/2017.    Please contact [facility] at ext. [phone] with any questions.    Thank you,  Aurora Krishnamurthy

## 2017-09-20 ENCOUNTER — TELEPHONE (OUTPATIENT)
Dept: FAMILY MEDICINE | Facility: CLINIC | Age: 67
End: 2017-09-20

## 2017-09-23 ENCOUNTER — PATIENT MESSAGE (OUTPATIENT)
Dept: FAMILY MEDICINE | Facility: CLINIC | Age: 67
End: 2017-09-23

## 2017-09-25 RX ORDER — PANTOPRAZOLE SODIUM 40 MG/1
40 TABLET, DELAYED RELEASE ORAL DAILY
Qty: 30 TABLET | Refills: 5 | Status: SHIPPED | OUTPATIENT
Start: 2017-09-25 | End: 2018-06-15 | Stop reason: SDUPTHER

## 2017-09-25 RX ORDER — CLONIDINE HYDROCHLORIDE 0.1 MG/1
0.1 TABLET ORAL 3 TIMES DAILY
Qty: 90 TABLET | Refills: 5 | Status: SHIPPED | OUTPATIENT
Start: 2017-09-25 | End: 2018-05-15 | Stop reason: SDUPTHER

## 2017-09-28 ENCOUNTER — PATIENT MESSAGE (OUTPATIENT)
Dept: OPHTHALMOLOGY | Facility: CLINIC | Age: 67
End: 2017-09-28

## 2017-10-04 ENCOUNTER — TELEPHONE (OUTPATIENT)
Dept: HEPATOLOGY | Facility: CLINIC | Age: 67
End: 2017-10-04

## 2017-10-04 ENCOUNTER — EPISODE CHANGES (OUTPATIENT)
Dept: HEPATOLOGY | Facility: CLINIC | Age: 67
End: 2017-10-04

## 2017-10-04 DIAGNOSIS — B18.2 CHRONIC HEPATITIS C WITHOUT HEPATIC COMA: Primary | ICD-10-CM

## 2017-10-04 NOTE — TELEPHONE ENCOUNTER
I spoke with patient who needs to have SVR 24 lab draw. He states that he will have blood drawn at [facility] on 10/11/17.  Lab order mailed to him.

## 2017-10-09 ENCOUNTER — TELEPHONE (OUTPATIENT)
Dept: OPHTHALMOLOGY | Facility: CLINIC | Age: 67
End: 2017-10-09

## 2017-10-09 RX ORDER — BRIMONIDINE TARTRATE 1.5 MG/ML
SOLUTION/ DROPS OPHTHALMIC
Qty: 10 ML | Refills: 1 | Status: SHIPPED | OUTPATIENT
Start: 2017-10-09 | End: 2017-11-02 | Stop reason: SDUPTHER

## 2017-10-10 ENCOUNTER — DOCUMENTATION ONLY (OUTPATIENT)
Dept: FAMILY MEDICINE | Facility: CLINIC | Age: 67
End: 2017-10-10

## 2017-10-10 NOTE — PROGRESS NOTES
Health Maintenance Due   Topic Date Due    TETANUS VACCINE  12/08/1969    Colonoscopy  12/08/2001    Pneumococcal (65+) (1 of 2 - PCV13) 12/08/2016    Influenza Vaccine  08/01/2017

## 2017-10-11 ENCOUNTER — APPOINTMENT (OUTPATIENT)
Dept: LAB | Facility: HOSPITAL | Age: 67
End: 2017-10-11
Attending: PATHOLOGY
Payer: MEDICARE

## 2017-10-11 ENCOUNTER — APPOINTMENT (OUTPATIENT)
Dept: LAB | Facility: HOSPITAL | Age: 67
End: 2017-10-11
Attending: NURSE PRACTITIONER
Payer: MEDICARE

## 2017-10-11 ENCOUNTER — OFFICE VISIT (OUTPATIENT)
Dept: FAMILY MEDICINE | Facility: CLINIC | Age: 67
End: 2017-10-11
Payer: MEDICARE

## 2017-10-11 VITALS
TEMPERATURE: 98 F | SYSTOLIC BLOOD PRESSURE: 105 MMHG | WEIGHT: 201.75 LBS | HEART RATE: 83 BPM | HEIGHT: 68 IN | OXYGEN SATURATION: 93 % | BODY MASS INDEX: 30.58 KG/M2 | DIASTOLIC BLOOD PRESSURE: 76 MMHG

## 2017-10-11 DIAGNOSIS — R73.9 HYPERGLYCEMIA: ICD-10-CM

## 2017-10-11 DIAGNOSIS — G89.29 CHRONIC LOW BACK PAIN, UNSPECIFIED BACK PAIN LATERALITY, WITH SCIATICA PRESENCE UNSPECIFIED: ICD-10-CM

## 2017-10-11 DIAGNOSIS — D50.8 OTHER IRON DEFICIENCY ANEMIA: ICD-10-CM

## 2017-10-11 DIAGNOSIS — M54.5 CHRONIC LOW BACK PAIN, UNSPECIFIED BACK PAIN LATERALITY, WITH SCIATICA PRESENCE UNSPECIFIED: ICD-10-CM

## 2017-10-11 DIAGNOSIS — Z23 NEED FOR PROPHYLACTIC VACCINATION AGAINST STREPTOCOCCUS PNEUMONIAE (PNEUMOCOCCUS): ICD-10-CM

## 2017-10-11 DIAGNOSIS — B19.20 HEPATITIS C VIRUS INFECTION, UNSPECIFIED CHRONICITY: ICD-10-CM

## 2017-10-11 DIAGNOSIS — I25.10 CORONARY ARTERY DISEASE INVOLVING NATIVE CORONARY ARTERY OF NATIVE HEART WITHOUT ANGINA PECTORIS: Primary | ICD-10-CM

## 2017-10-11 DIAGNOSIS — F33.9 EPISODE OF RECURRENT MAJOR DEPRESSIVE DISORDER, UNSPECIFIED DEPRESSION EPISODE SEVERITY: ICD-10-CM

## 2017-10-11 DIAGNOSIS — F41.9 ANXIETY: ICD-10-CM

## 2017-10-11 DIAGNOSIS — Z23 NEEDS FLU SHOT: ICD-10-CM

## 2017-10-11 DIAGNOSIS — R79.89 LOW TESTOSTERONE IN MALE: ICD-10-CM

## 2017-10-11 LAB
BASOPHILS # BLD AUTO: 0 K/UL
BASOPHILS NFR BLD: 0.3 %
DIFFERENTIAL METHOD: ABNORMAL
EOSINOPHIL # BLD AUTO: 0.2 K/UL
EOSINOPHIL NFR BLD: 2.1 %
ERYTHROCYTE [DISTWIDTH] IN BLOOD BY AUTOMATED COUNT: 13.1 %
FERRITIN SERPL-MCNC: 53 NG/ML
HCT VFR BLD AUTO: 41.1 %
HGB BLD-MCNC: 13.8 G/DL
IRON SERPL-MCNC: 131 UG/DL
LYMPHOCYTES # BLD AUTO: 4.6 K/UL
LYMPHOCYTES NFR BLD: 49.1 %
MCH RBC QN AUTO: 29.6 PG
MCHC RBC AUTO-ENTMCNC: 33.5 G/DL
MCV RBC AUTO: 89 FL
MONOCYTES # BLD AUTO: 0.7 K/UL
MONOCYTES NFR BLD: 7.6 %
NEUTROPHILS # BLD AUTO: 3.9 K/UL
NEUTROPHILS NFR BLD: 40.9 %
PLATELET # BLD AUTO: 204 K/UL
PMV BLD AUTO: 8.3 FL
RBC # BLD AUTO: 4.65 M/UL
SATURATED IRON: 30 %
TOTAL IRON BINDING CAPACITY: 432 UG/DL
TRANSFERRIN SERPL-MCNC: 292 MG/DL
WBC # BLD AUTO: 9.4 K/UL

## 2017-10-11 PROCEDURE — 82728 ASSAY OF FERRITIN: CPT

## 2017-10-11 PROCEDURE — 85025 COMPLETE CBC W/AUTO DIFF WBC: CPT

## 2017-10-11 PROCEDURE — 83036 HEMOGLOBIN GLYCOSYLATED A1C: CPT

## 2017-10-11 PROCEDURE — 90662 IIV NO PRSV INCREASED AG IM: CPT | Mod: S$GLB,,, | Performed by: NURSE PRACTITIONER

## 2017-10-11 PROCEDURE — 83540 ASSAY OF IRON: CPT

## 2017-10-11 PROCEDURE — 87522 HEPATITIS C REVRS TRNSCRPJ: CPT

## 2017-10-11 PROCEDURE — 90670 PCV13 VACCINE IM: CPT | Mod: S$GLB,,, | Performed by: NURSE PRACTITIONER

## 2017-10-11 PROCEDURE — G0009 ADMIN PNEUMOCOCCAL VACCINE: HCPCS | Mod: S$GLB,,, | Performed by: NURSE PRACTITIONER

## 2017-10-11 PROCEDURE — 99214 OFFICE O/P EST MOD 30 MIN: CPT | Mod: 25,51,S$GLB, | Performed by: NURSE PRACTITIONER

## 2017-10-11 PROCEDURE — G0008 ADMIN INFLUENZA VIRUS VAC: HCPCS | Mod: S$GLB,,, | Performed by: NURSE PRACTITIONER

## 2017-10-11 RX ORDER — NITROGLYCERIN 0.4 MG/1
0.4 TABLET SUBLINGUAL EVERY 5 MIN PRN
Qty: 20 TABLET | Refills: 11 | Status: SHIPPED | OUTPATIENT
Start: 2017-10-11

## 2017-10-11 RX ORDER — TIZANIDINE 4 MG/1
4 TABLET ORAL EVERY 8 HOURS
Qty: 270 TABLET | Refills: 3 | Status: SHIPPED | OUTPATIENT
Start: 2017-10-11

## 2017-10-11 RX ORDER — DICLOFENAC SODIUM AND MISOPROSTOL 75; 200 MG/1; UG/1
1 TABLET, DELAYED RELEASE ORAL 2 TIMES DAILY
Qty: 60 TABLET | Refills: 11 | Status: SHIPPED | OUTPATIENT
Start: 2017-10-11 | End: 2018-10-11

## 2017-10-11 RX ORDER — FLUOXETINE HYDROCHLORIDE 40 MG/1
40 CAPSULE ORAL DAILY
Qty: 30 CAPSULE | Refills: 0 | Status: SHIPPED | OUTPATIENT
Start: 2017-10-11 | End: 2017-11-07 | Stop reason: SDUPTHER

## 2017-10-11 NOTE — PROGRESS NOTES
"Medical/NARDA Student  Encounter Date: 10/11/2017  Marisabel Solorzano        Subjective:       Patient ID: Benny Murray is a 66 y.o. male.     Chief Complaint: Follow-up  66 year old male with a history of a chronic back pain from work-related injury at his job at [employer] over 10 years ago reports medication is not helping his back pain. He has tried OTC Jose lotion with minimal relief. He was seeing pain management at [facility] which closed in May, and he abruptly stopped fentyl. He has done PT in the past, but reports he is not financially able to make it to appts as he needs to call a taxi for every appt.     He has chronic depression and is requesting a refill on his prozac stating that it is helping his anxiety, but he feels "no get up and go" and has gained 10 lbs since his last visit. He stopped taking the prozac a couple of months ago and then found some around the house and restarted it about 5 days ago. He denies suicidal or homicidal ideation.     He reports a history of low testosterone that was previously treated with bimonthly testosterone injections.     Has a history of CAD, has not followed up with cardiologist, even though it was ordered previously.           Back Pain   This is a chronic problem. The current episode started more than 1 year ago. The problem occurs constantly. The problem has been gradually worsening since onset. The pain is present in the lumbar spine. The quality of the pain is described as aching. The pain does not radiate. The pain is at a severity of 6/10. The pain is moderate. The pain is worse during the night. The symptoms are aggravated by sitting, position, standing and stress. Stiffness is present: denies. Associated symptoms include weakness. Pertinent negatives include no abdominal pain, chest pain, leg pain, numbness, paresthesias or tingling. Risk factors include sedentary lifestyle and lack of exercise. He has tried muscle relaxant and NSAIDs (uses Jose lotion) for the " "symptoms. The treatment provided mild relief.      Review of Systems   Constitutional: Positive for activity change and fatigue.        Reports no energy    HENT: Negative.    Eyes: Negative.    Respiratory: Negative for shortness of breath.         MORA, resolves with rest    Cardiovascular: Negative for chest pain.   Gastrointestinal: Negative for abdominal pain, constipation, nausea and vomiting.   Endocrine: Positive for polydipsia, polyphagia and polyuria.        Up 4+ times at night with nocturia   Genitourinary:        Reports no erections in 3-4 years, denies erection upon waking, and reports "shrinking testicles"    Musculoskeletal: Positive for back pain.   Skin: Negative.    Neurological: Positive for weakness. Negative for tingling, numbness and paresthesias.   Psychiatric/Behavioral: Positive for sleep disturbance. Negative for self-injury and suicidal ideas.        Reports anxiety is improved with prozac, reports trouble falling asleep and staying asleep, denies waking in pain.     Reports decreased drive and "no get up and go"       Objective:      Physical Exam   Constitutional: He is oriented to person, place, and time. He appears well-developed and well-nourished.   HENT:   Head: Normocephalic and atraumatic.   Eyes: EOM are normal. Pupils are equal, round, and reactive to light.   Neck: Normal range of motion.   Cardiovascular: Normal rate and regular rhythm.    Pulmonary/Chest: Effort normal and breath sounds normal.   Abdominal: Soft.   Musculoskeletal:   Walks with cane, wearing back brace   Neurological: He is alert and oriented to person, place, and time.   Skin: Skin is warm and dry. Capillary refill takes less than 2 seconds.   Psychiatric: His behavior is normal. Judgment and thought content normal.   blunted       Assessment:     see below for correct diagnoses and order  I also saw patient face to face and agree with medical student's H&P , I have diagnosed and written treatment plan. "     1. Hepatitis C virus infection, unspecified chronicity    2. Episode of recurrent major depressive disorder, unspecified depression episode severity    3. Chronic low back pain, unspecified back pain laterality, with sciatica presence unspecified    4. Low testosterone in male    5. Anxiety        Plan:      Coronary artery disease involving native coronary artery of native heart without angina pectoris  -     Ambulatory referral to Cardiology  -     nitroGLYCERIN (NITROSTAT) 0.4 MG SL tablet; Place 1 tablet (0.4 mg total) under the tongue every 5 (five) minutes as needed for Chest pain. X3, if no relief, call 911  Dispense: 20 tablet; Refill: 11    Hepatitis C virus infection, unspecified chronicity  -     HEPATITIS C RNA, QUANTITATIVE, PCR; Future; Expected date: 10/11/2017    Chronic low back pain, unspecified back pain laterality, with sciatica presence unspecified  -     diclofenac-misoprostol  mg-mcg (ARTHROTEC 75)  mg-mcg per tablet; Take 1 tablet by mouth 2 (two) times daily.  Dispense: 60 tablet; Refill: 11  -     tizanidine (ZANAFLEX) 4 MG tablet; Take 1 tablet (4 mg total) by mouth every 8 (eight) hours.  Dispense: 270 tablet; Refill: 3    Episode of recurrent major depressive disorder, unspecified depression episode severity  -     fluoxetine (PROZAC) 40 MG capsule; Take 1 capsule (40 mg total) by mouth once daily.  Dispense: 30 capsule; Refill: 0    Low testosterone in male    Anxiety    Other iron deficiency anemia  -     CBC auto differential; Future; Expected date: 10/11/2017  -     Iron and TIBC; Future; Expected date: 10/11/2017  -     Ferritin; Future; Expected date: 10/11/2017    Hyperglycemia  -     Hemoglobin A1c; Future; Expected date: 10/11/2017    Needs flu shot  -     Influenza - High Dose (65+) (PF) (IM)    Need for prophylactic vaccination against Streptococcus pneumoniae (pneumococcus)  -     (In Office Administered) Pneumococcal Conjugate Vaccine (13 Valent)  (IM)      Discussed use of testosterone, although it is low, it can cause a heart attack. If cardiologist approves use of Testosterone for you, I will order it.                 Electronically signed by Marisabel Solorzano at 10/11/2017 11:36 AM

## 2017-10-11 NOTE — MEDICAL/APP STUDENT
"Subjective:       Patient ID: Benny Murray is a 66 y.o. male.    Chief Complaint: Follow-up  66 year old male with a history of a work-related injury at his job at [employer] over 10 years ago reports medication is not helping his back pain. He has tried OTC Jose lotion with minimal relief. He was seeing pain management at [facility] which closed in May, and he abruptly stopped fentyl. He has done PT in the past, but reports he is not financially able to make it to appts as he needs to call a taxi for every appt. He is requesting a refill on his prozac stating that it is helping his anxiety, but he feels "no get up and go" and has gained 10 lbs since his last visit. He reports a history of low testosterone that was previously treated with bimonthly testosterone injections.           Back Pain   This is a chronic problem. The current episode started more than 1 year ago. The problem occurs constantly. The problem has been gradually worsening since onset. The pain is present in the lumbar spine. The quality of the pain is described as aching. The pain does not radiate. The pain is at a severity of 6/10. The pain is moderate. The pain is worse during the night. The symptoms are aggravated by sitting, position, standing and stress. Stiffness is present: denies. Associated symptoms include weakness. Pertinent negatives include no abdominal pain, chest pain, leg pain, numbness, paresthesias or tingling. Risk factors include sedentary lifestyle and lack of exercise. He has tried muscle relaxant and NSAIDs (uses Jose lotion) for the symptoms. The treatment provided mild relief.     Review of Systems   Constitutional: Positive for activity change and fatigue.        Reports no energy    HENT: Negative.    Eyes: Negative.    Respiratory: Negative for shortness of breath.         MORA, resolves with rest    Cardiovascular: Negative for chest pain.   Gastrointestinal: Negative for abdominal pain, constipation, nausea and " "vomiting.   Endocrine: Positive for polydipsia, polyphagia and polyuria.        Up 4+ times at night with nocturia   Genitourinary:        Reports no erections in 3-4 years, denies erection upon waking, and reports "shrinking testicles"    Musculoskeletal: Positive for back pain.   Skin: Negative.    Neurological: Positive for weakness. Negative for tingling, numbness and paresthesias.   Psychiatric/Behavioral: Positive for sleep disturbance. Negative for self-injury and suicidal ideas.        Reports anxiety is improved with prozac, reports trouble falling asleep and staying asleep, denies waking in pain.    Reports decreased drive and "no get up and go"       Objective:      Physical Exam   Constitutional: He is oriented to person, place, and time. He appears well-developed and well-nourished.   HENT:   Head: Normocephalic and atraumatic.   Eyes: EOM are normal. Pupils are equal, round, and reactive to light.   Neck: Normal range of motion.   Cardiovascular: Normal rate and regular rhythm.    Pulmonary/Chest: Effort normal and breath sounds normal.   Abdominal: Soft.   Musculoskeletal:   Walks with cane, wearing back brace   Neurological: He is alert and oriented to person, place, and time.   Skin: Skin is warm and dry. Capillary refill takes less than 2 seconds.   Psychiatric: His behavior is normal. Judgment and thought content normal.   blunted       Assessment:       1. Hepatitis C virus infection, unspecified chronicity    2. Episode of recurrent major depressive disorder, unspecified depression episode severity    3. Chronic low back pain, unspecified back pain laterality, with sciatica presence unspecified    4. Low testosterone in male    5. Anxiety        Plan:     Hepatitis C virus infection, unspecified chronicity    Episode of recurrent major depressive disorder, unspecified depression episode severity    Chronic low back pain, unspecified back pain laterality, with sciatica presence unspecified    Low " testosterone in male    Anxiety

## 2017-10-12 LAB
ESTIMATED AVG GLUCOSE: 114 MG/DL
HBA1C MFR BLD HPLC: 5.6 %

## 2017-10-13 ENCOUNTER — EPISODE CHANGES (OUTPATIENT)
Dept: HEPATOLOGY | Facility: CLINIC | Age: 67
End: 2017-10-13

## 2017-10-13 LAB
HCV LOG: <1.08 LOG (10) IU/ML
HCV RNA QUANT PCR: <12 IU/ML
HCV, QUALITATIVE: NOT DETECTED IU/ML

## 2017-10-18 ENCOUNTER — EPISODE CHANGES (OUTPATIENT)
Dept: HEPATOLOGY | Facility: CLINIC | Age: 67
End: 2017-10-18

## 2017-10-19 ENCOUNTER — PATIENT MESSAGE (OUTPATIENT)
Dept: FAMILY MEDICINE | Facility: CLINIC | Age: 67
End: 2017-10-19

## 2017-10-20 ENCOUNTER — PATIENT MESSAGE (OUTPATIENT)
Dept: FAMILY MEDICINE | Facility: CLINIC | Age: 67
End: 2017-10-20

## 2017-10-20 DIAGNOSIS — I10 ESSENTIAL HYPERTENSION, BENIGN: ICD-10-CM

## 2017-10-20 RX ORDER — VERAPAMIL HYDROCHLORIDE 240 MG/1
TABLET, FILM COATED, EXTENDED RELEASE ORAL
Qty: 90 TABLET | Refills: 3 | Status: SHIPPED | OUTPATIENT
Start: 2017-10-20

## 2017-10-20 RX ORDER — VERAPAMIL HYDROCHLORIDE 240 MG/1
TABLET, FILM COATED, EXTENDED RELEASE ORAL
Qty: 90 TABLET | Refills: 3 | Status: CANCELLED | OUTPATIENT
Start: 2017-10-20

## 2017-10-23 ENCOUNTER — TELEPHONE (OUTPATIENT)
Dept: OPHTHALMOLOGY | Facility: CLINIC | Age: 67
End: 2017-10-23

## 2017-10-23 NOTE — TELEPHONE ENCOUNTER
----- Message from Yue Jorgensen sent at 10/23/2017 10:18 AM CDT -----  Contact: self [phone]  Please call him, he is requesting a sooner appt.  Thank you!

## 2017-10-30 ENCOUNTER — CLINICAL SUPPORT (OUTPATIENT)
Dept: OPHTHALMOLOGY | Facility: CLINIC | Age: 67
End: 2017-10-30
Payer: MEDICARE

## 2017-10-30 DIAGNOSIS — H40.003 GLAUCOMA SUSPECT, BILATERAL: ICD-10-CM

## 2017-10-30 PROCEDURE — 92083 EXTENDED VISUAL FIELD XM: CPT | Mod: S$GLB,,, | Performed by: OPHTHALMOLOGY

## 2017-11-02 ENCOUNTER — OFFICE VISIT (OUTPATIENT)
Dept: OPHTHALMOLOGY | Facility: CLINIC | Age: 67
End: 2017-11-02
Payer: MEDICARE

## 2017-11-02 DIAGNOSIS — H40.003 GLAUCOMA SUSPECT, BILATERAL: Primary | ICD-10-CM

## 2017-11-02 DIAGNOSIS — H53.9 VISUAL DISTURBANCE: ICD-10-CM

## 2017-11-02 DIAGNOSIS — H52.7 REFRACTIVE ERROR: ICD-10-CM

## 2017-11-02 DIAGNOSIS — H35.372 EPIRETINAL MEMBRANE, LEFT EYE: ICD-10-CM

## 2017-11-02 DIAGNOSIS — H17.11: ICD-10-CM

## 2017-11-02 PROCEDURE — 99999 PR PBB SHADOW E&M-EST. PATIENT-LVL III: CPT | Mod: PBBFAC,,, | Performed by: OPHTHALMOLOGY

## 2017-11-02 PROCEDURE — 92134 CPTRZ OPH DX IMG PST SGM RTA: CPT | Mod: S$GLB,,, | Performed by: OPHTHALMOLOGY

## 2017-11-02 PROCEDURE — 92014 COMPRE OPH EXAM EST PT 1/>: CPT | Mod: S$GLB,,, | Performed by: OPHTHALMOLOGY

## 2017-11-02 PROCEDURE — 92015 DETERMINE REFRACTIVE STATE: CPT | Mod: S$GLB,,, | Performed by: OPHTHALMOLOGY

## 2017-11-02 RX ORDER — BRIMONIDINE TARTRATE 1.5 MG/ML
SOLUTION/ DROPS OPHTHALMIC
Qty: 10 ML | Refills: 11 | Status: SHIPPED | OUTPATIENT
Start: 2017-11-02 | End: 2018-03-14

## 2017-11-02 RX ORDER — LATANOPROST 50 UG/ML
1 SOLUTION/ DROPS OPHTHALMIC NIGHTLY
Qty: 2.5 ML | Refills: 11 | Status: SHIPPED | OUTPATIENT
Start: 2017-11-02

## 2017-11-02 NOTE — PROGRESS NOTES
HPI     Glaucoma    Additional comments: pt on brimonidine BID OU, Latanoprost QHS OD//hvf   review done 10/30/2017dilated HRT            Comments   pt on brimonidine BID OU, Latanoprost QHS OD//    No blurred only in the OD which is normal for him// pt does not get   migraines any more but a month ago he  did have an aura//  No migraines   for about 2000, 1 month ago had aura with no pain//  Lasted about an   hour// pos for floaters not new//    Agree with above. States that the visual disturbance increased and then   faded, no headache. States it was not as bad as it has been in the past.   Occasional trouble remembering words, but not associated with this event,   no other neurologic symptoms noted. Vision went back to normal.        Last edited by Skyla Crow MD on 11/2/2017  2:40 PM.   (History)        ROS     Positive for: Neurological (Hx CVA), Genitourinary (history of renal   problems due to dehydration - none now; denies flomax ), Musculoskeletal   (spinal problems), Cardiovascular (HTN - controlled with meds per pt; MI   2017, now s/p 2 stents), Eyes, Psychiatric (history of depression)    Negative for: Endocrine (no DM or thyroid problems), Respiratory (denies   SOB or orthopnea)    Last edited by Skyla Crow MD on 11/2/2017  2:29 PM.   (History)        Assessment /Plan     For exam results, see Encounter Report.    Glaucoma suspect, bilateral  -     [facility] (HRT) - OU - Both Eyes  -     Posterior Segment OCT Optic Nerve- Both eyes; Future  -     [facility] (HRT) - OU - Both Eyes    Corneal opacity, central, right    Epiretinal membrane, left eye    Visual disturbance    Refractive error    Other orders  -     brimonidine 0.15 % OPTH DROP (ALPHAGAN) 0.15 % ophthalmic solution; INSTILL 1 DROP INTO EACH EYE TWICE A DAY  Dispense: 10 mL; Refill: 11  -     latanoprost 0.005 % ophthalmic solution; Place 1 drop into both eyes every evening.   "Dispense: 2.5 mL; Refill: 11            1. Glaucoma, suspect  IOP still great with Brimonidine 0.2% BID OU, Latanoprost QHS OD. HRT - poor images today OU. Early new inferior HVF defect OD not present on recent test; new defect OS but this is the nerve that appears WNL. Latanoprost was resumed after CE OD. Continue same meds, f/u 4 months IOP check, with OCT optic nerve - ok to dilate if needed for OCT.    Visual disturbance Discussed MRI if lasts longer than 30 minutes again.    Dry eyes Continue artificial tears/Systane prn    K scar OD Uncertain etiology - denies history of infection/trauma/red eye. Discussed lamellar KP - Offered consultation with Dr. Persaud - pt declines for now.     Monocular diplopia - Right eye History of loss of consciousness with lagophthalmos in 2003, monocular diplopia OD since then. Did not improve with CE, unable to resolve with glasses. Discussed options of RGP CL - explained there would be fitting fee, then fee for CL if effective, or to see Dr. Persaud for evaluation for possible lamellar keratoplasty. Pt content with glasses at this time   3. CME (cystoid macular edema)  Appears stable on last OCT. Has not followed up with Dr. Avitia due to cost.    4. Branch retinal vein occlusion of left eye  "   5. Myopia with astigmatism and presbyopia MRx given today.                      "

## 2017-11-04 ENCOUNTER — PATIENT MESSAGE (OUTPATIENT)
Dept: FAMILY MEDICINE | Facility: CLINIC | Age: 67
End: 2017-11-04

## 2017-11-07 ENCOUNTER — PATIENT MESSAGE (OUTPATIENT)
Dept: FAMILY MEDICINE | Facility: CLINIC | Age: 67
End: 2017-11-07

## 2017-11-07 DIAGNOSIS — F33.9 EPISODE OF RECURRENT MAJOR DEPRESSIVE DISORDER, UNSPECIFIED DEPRESSION EPISODE SEVERITY: ICD-10-CM

## 2017-11-07 RX ORDER — FLUOXETINE HYDROCHLORIDE 40 MG/1
40 CAPSULE ORAL DAILY
Qty: 30 CAPSULE | Refills: 0 | Status: SHIPPED | OUTPATIENT
Start: 2017-11-07 | End: 2018-01-09 | Stop reason: SDUPTHER

## 2017-11-17 DIAGNOSIS — F33.41 RECURRENT MAJOR DEPRESSIVE DISORDER, IN PARTIAL REMISSION: Primary | ICD-10-CM

## 2017-11-18 ENCOUNTER — OUTPATIENT CASE MANAGEMENT (OUTPATIENT)
Dept: ADMINISTRATIVE | Facility: OTHER | Age: 67
End: 2017-11-18

## 2017-11-18 NOTE — PROGRESS NOTES
Thank you for the referral. The following patient has been assigned to Issa Soriano, LEROY with [facility] for high risk screening.    Reason for referral:   Recurrent major depressive disorder, in partial remission    Referral Comment:  Chronic Hep C, Hypertensive retinopathy, coronary artery disease involoving native coronary artery of natife heart without angina  Recurrent major depression  prior opiate withdrawl    Please contact [facility] at [phone] with any questions.    Thank you,  Aurora Krishnamurthy

## 2017-11-20 ENCOUNTER — DOCUMENTATION ONLY (OUTPATIENT)
Dept: FAMILY MEDICINE | Facility: CLINIC | Age: 67
End: 2017-11-20

## 2017-11-21 ENCOUNTER — OFFICE VISIT (OUTPATIENT)
Dept: FAMILY MEDICINE | Facility: CLINIC | Age: 67
End: 2017-11-21
Payer: MEDICARE

## 2017-11-21 VITALS
HEIGHT: 68 IN | WEIGHT: 206.13 LBS | DIASTOLIC BLOOD PRESSURE: 76 MMHG | BODY MASS INDEX: 31.24 KG/M2 | TEMPERATURE: 98 F | SYSTOLIC BLOOD PRESSURE: 105 MMHG | OXYGEN SATURATION: 92 % | HEART RATE: 80 BPM

## 2017-11-21 DIAGNOSIS — G25.81 RESTLESS LEG: Primary | ICD-10-CM

## 2017-11-21 DIAGNOSIS — R79.89 LOW TESTOSTERONE IN MALE: ICD-10-CM

## 2017-11-21 PROCEDURE — 99214 OFFICE O/P EST MOD 30 MIN: CPT | Mod: S$GLB,,, | Performed by: NURSE PRACTITIONER

## 2017-11-21 RX ORDER — PRAMIPEXOLE DIHYDROCHLORIDE 0.25 MG/1
0.25 TABLET ORAL 3 TIMES DAILY
Qty: 90 TABLET | Refills: 11 | Status: SHIPPED | OUTPATIENT
Start: 2017-11-21 | End: 2017-12-01

## 2017-11-21 NOTE — PATIENT INSTRUCTIONS
Stop iron tablets.  Try using stevia instead of sugar. Try using ice on your back, especially after using heat. Both should be limited to 20 minutes at a time.

## 2017-11-21 NOTE — PROGRESS NOTES
Health Maintenance Due   Topic Date Due    TETANUS VACCINE  12/08/1969    Colonoscopy  12/08/2001    Lipid Panel  11/10/2017

## 2017-11-21 NOTE — PROGRESS NOTES
Subjective:       Patient ID: Benny Murray is a 66 y.o. male.    Chief Complaint: Follow-up  Has not had a any chest pain in the past month. Has an appointment coming up with the cardiologist. Anemia has pretty much resolved. Still taking iron daily. Eating a lot of red meat.     BMI 31, c/o 6 pound weight gain in the past month. Diet is reasonable, doesn't snack during. Does drink sweet tea.     Back pain persists, but is better with the arthrotec. Uses heat but no ice. Has not seen pain management. Has tingling in both legs, no numbness and no pain. He has to stretch them often, feels better when he is moving them.     Has low testosterone, has not seen cardiologist for clearance yet, would like order for testosterone, understands that it may cause a heart attack which could be fatal.   HPI  Review of Systems   Constitutional: Positive for unexpected weight change. Negative for activity change.   HENT: Negative for hearing loss, rhinorrhea and trouble swallowing.    Eyes: Negative for discharge and visual disturbance.   Respiratory: Negative for cough, chest tightness, shortness of breath and wheezing.    Cardiovascular: Negative for chest pain and palpitations.   Gastrointestinal: Negative for blood in stool, constipation, diarrhea and vomiting.   Endocrine: Negative for polydipsia and polyuria.   Genitourinary: Negative for difficulty urinating, hematuria and urgency.   Musculoskeletal: Negative for arthralgias, joint swelling and neck pain.   Neurological: Negative for weakness and headaches.   Psychiatric/Behavioral: Negative for confusion and dysphoric mood.       Objective:       Lab Results   Component Value Date    WBC 9.40 10/11/2017    HGB 13.8 (L) 10/11/2017    HCT 41.1 10/11/2017    MCV 89 10/11/2017     10/11/2017     CMP  Sodium   Date Value Ref Range Status   07/12/2017 139 136 - 145 mmol/L Final     Potassium   Date Value Ref Range Status   07/12/2017 4.1 3.5 - 5.1 mmol/L Final      Chloride   Date Value Ref Range Status   07/12/2017 100 95 - 110 mmol/L Final     CO2   Date Value Ref Range Status   07/12/2017 27 23 - 29 mmol/L Final     Glucose   Date Value Ref Range Status   07/12/2017 107 70 - 110 mg/dL Final     BUN, Bld   Date Value Ref Range Status   07/12/2017 15 8 - 23 mg/dL Final     Creatinine   Date Value Ref Range Status   07/12/2017 1.0 0.5 - 1.4 mg/dL Final   02/05/2013 1.2 0.5 - 1.4 mg/dL Final     Calcium   Date Value Ref Range Status   07/12/2017 10.3 8.7 - 10.5 mg/dL Final   02/05/2013 10.5 8.7 - 10.5 mg/dL Final     Total Protein   Date Value Ref Range Status   07/12/2017 7.8 6.0 - 8.4 g/dL Final     Albumin   Date Value Ref Range Status   07/12/2017 4.0 3.5 - 5.2 g/dL Final     Total Bilirubin   Date Value Ref Range Status   07/12/2017 0.4 0.1 - 1.0 mg/dL Final     Comment:     For infants and newborns, interpretation of results should be based  on gestational age, weight and in agreement with clinical  observations.  Premature Infant recommended reference ranges:  Up to 24 hours.............<8.0 mg/dL  Up to 48 hours............<12.0 mg/dL  3-5 days..................<15.0 mg/dL  6-29 days.................<15.0 mg/dL       Alkaline Phosphatase   Date Value Ref Range Status   07/12/2017 106 55 - 135 U/L Final   02/05/2013 56 55 - 135 U/L Final     AST   Date Value Ref Range Status   07/12/2017 23 10 - 40 U/L Final   02/05/2013 47 (H) 10 - 40 U/L Final     ALT   Date Value Ref Range Status   07/12/2017 17 10 - 44 U/L Final     Anion Gap   Date Value Ref Range Status   07/12/2017 12 8 - 16 mmol/L Final   02/05/2013 9 5 - 15 meq/L Final     eGFR if    Date Value Ref Range Status   07/12/2017 >60 >60 mL/min/1.73 m^2 Final     eGFR if non    Date Value Ref Range Status   07/12/2017 >60 >60 mL/min/1.73 m^2 Final     Comment:     Calculation used to obtain the estimated glomerular filtration  rate (eGFR) is the CKD-EPI equation. Since race is  unknown   in our information system, the eGFR values for   -American and Non--American patients are given   for each creatinine result.       Lab Results   Component Value Date    HGBA1C 5.6 10/11/2017   Hep C no viral load found    Physical Exam   Constitutional: He is oriented to person, place, and time. He appears well-developed and well-nourished. No distress.   HENT:   Head: Normocephalic and atraumatic.   Eyes: Conjunctivae are normal. Right eye exhibits no discharge. Left eye exhibits no discharge. No scleral icterus.   Cardiovascular: Normal rate, regular rhythm and normal heart sounds.  Exam reveals no gallop and no friction rub.    No murmur heard.  Pulmonary/Chest: Effort normal and breath sounds normal. No respiratory distress. He has no wheezes. He has no rales.   Neurological: He is alert and oriented to person, place, and time.   Skin: Skin is warm and dry. He is not diaphoretic.   Psychiatric: He has a normal mood and affect. His behavior is normal.   Nursing note and vitals reviewed.      Assessment:     This provider spent more than 25 minutes face to face with patient, more than half the time for counseling and coordination of care as noted.  This office visit cannot be billed incident to as it does not meet the needed criteria. No physician in the building.     1. Restless leg    2. BMI 31.0-31.9,adult    3. Low testosterone in male        Plan:     Restless leg  -     pramipexole (MIRAPEX) 0.25 MG tablet; Take 1 tablet (0.25 mg total) by mouth 3 (three) times daily.  Dispense: 90 tablet; Refill: 11    BMI 31.0-31.9,adult    Low testosterone in male        Stop iron tablets.  Try using stevia instead of sugar. Try using ice on your back, especially after using heat. Both should be limited to 20 minutes at a time.   Discussed testosterone and why he needs to see the cardiologist first before we order it, even though he has documented low testosterone on labs. Understands it can cause  a heart attack which could be fatal and still wishes to have on order for testosterone. If cardiologist is agreeable, will order same.

## 2017-11-24 ENCOUNTER — OUTPATIENT CASE MANAGEMENT (OUTPATIENT)
Dept: ADMINISTRATIVE | Facility: OTHER | Age: 67
End: 2017-11-24

## 2017-11-24 NOTE — PROGRESS NOTES
11/24/17-RN CM called and spoke with Mr. Murray in attempt to screen for OPCM. Patient known to this RN CM from previous enrollment. Patient aware of purpose of OPCM. Stated that everything is going well at this time. Denies need for assistance at this time. Patient reported that he does have RN CM contact information. Encouraged patient to contact OPCM in the event that needs develop. Verbalized understanding. Will close case at this time

## 2017-11-30 ENCOUNTER — PATIENT MESSAGE (OUTPATIENT)
Dept: FAMILY MEDICINE | Facility: CLINIC | Age: 67
End: 2017-11-30

## 2017-12-01 DIAGNOSIS — G25.81 RESTLESS LEG SYNDROME: Primary | ICD-10-CM

## 2017-12-01 RX ORDER — PRAMIPEXOLE DIHYDROCHLORIDE 0.5 MG/1
0.5 TABLET ORAL 3 TIMES DAILY
Qty: 90 TABLET | Refills: 11 | Status: SHIPPED | OUTPATIENT
Start: 2017-12-01 | End: 2018-05-15 | Stop reason: SDUPTHER

## 2017-12-13 ENCOUNTER — PATIENT MESSAGE (OUTPATIENT)
Dept: FAMILY MEDICINE | Facility: CLINIC | Age: 67
End: 2017-12-13

## 2017-12-13 ENCOUNTER — OFFICE VISIT (OUTPATIENT)
Dept: CARDIOLOGY | Facility: CLINIC | Age: 67
End: 2017-12-13
Payer: MEDICARE

## 2017-12-13 VITALS
HEIGHT: 68 IN | BODY MASS INDEX: 31.24 KG/M2 | OXYGEN SATURATION: 99 % | DIASTOLIC BLOOD PRESSURE: 80 MMHG | SYSTOLIC BLOOD PRESSURE: 117 MMHG | HEART RATE: 88 BPM | WEIGHT: 206.13 LBS

## 2017-12-13 DIAGNOSIS — I10 ESSENTIAL HYPERTENSION: ICD-10-CM

## 2017-12-13 DIAGNOSIS — I10 ESSENTIAL HYPERTENSION: Primary | ICD-10-CM

## 2017-12-13 DIAGNOSIS — I70.0 ATHEROSCLEROSIS OF AORTA: Primary | ICD-10-CM

## 2017-12-13 DIAGNOSIS — I21.4 NON-ST ELEVATION (NSTEMI) MYOCARDIAL INFARCTION: ICD-10-CM

## 2017-12-13 DIAGNOSIS — I25.10 CORONARY ARTERY DISEASE INVOLVING NATIVE CORONARY ARTERY OF NATIVE HEART WITHOUT ANGINA PECTORIS: ICD-10-CM

## 2017-12-13 PROBLEM — I21.9 MYOCARDIAL INFARCTION: Status: ACTIVE | Noted: 2017-12-13

## 2017-12-13 PROCEDURE — 93000 ELECTROCARDIOGRAM COMPLETE: CPT | Mod: S$GLB,,, | Performed by: INTERNAL MEDICINE

## 2017-12-13 PROCEDURE — 99205 OFFICE O/P NEW HI 60 MIN: CPT | Mod: S$GLB,,, | Performed by: INTERNAL MEDICINE

## 2017-12-13 PROCEDURE — 99999 PR PBB SHADOW E&M-EST. PATIENT-LVL V: CPT | Mod: PBBFAC,,, | Performed by: INTERNAL MEDICINE

## 2017-12-13 RX ORDER — ATORVASTATIN CALCIUM 20 MG/1
20 TABLET, FILM COATED ORAL DAILY
Qty: 90 TABLET | Refills: 3 | Status: SHIPPED | OUTPATIENT
Start: 2017-12-13 | End: 2018-12-13

## 2017-12-13 NOTE — LETTER
December 18, 2017      Laila Watt, APRN  [address]  [facility] [address]           [facility]  [address]  [facility] [address]  Phone: [phone]          Patient: Benny Murray   MR Number: [id number]   YOB: 1950   Date of Visit: 12/13/2017       Dear Laila Watt:    Thank you for referring Benny Murray to me for evaluation. Attached you will find relevant portions of my assessment and plan of care.    If you have questions, please do not hesitate to call me. I look forward to following Benny Murray along with you.    Sincerely,    Johnathon Gutierrez MD    Enclosure  CC:  No Recipients    If you would like to receive this communication electronically, please contact [email or web address] or [phone] to request more information on [employer] access.    For providers and/or their staff who would like to refer a patient to [facility], please contact us through our one-stop-shop provider referral line, [facility], at [phone].    If you feel you have received this communication in error or would no longer like to receive these types of communications, please e-mail [email or web address]

## 2017-12-13 NOTE — PROGRESS NOTES
[facility]    CC: Establish Care    Patient ID: Benny Murray is a 67 y.o. male with a past medical history of CAD s/p MI in 2001 and 2017 s/p PCI/ESTEFANÍA, CHF, HTN, who presents for an initial appointment.   Pertinent history/events are as follows:     -Kindly referred by Laila Watt  -Pt reports having MI in 2001 and 2017 (stents placed as [facility])    HPI:  Mr. Murray states he's doing well.  He has no no chest pain, SOB, LE edema, palpitations, TIA symptoms or syncope.  Not exercising regularly, and did not participate in cardiac rehab after MI (due to lack of transportation).  Wants to start taking testosterone for low levels.  EKG today shows normal sinus rhythm; left axis deviation; possible anterior infarct (age undetermined).      Past Medical History:   Diagnosis Date    Anemia     Anxiety     Arthritis     Blood transfusion     x4    Cancer BASAL CELL CHEST - NEW DX    Cataract     ou done    Clotting disorder     Coronary artery disease     Depression     Glaucoma     Heart abnormalities     Hypertension     Myocardial infarction 10 YRS AGO    silent    Osteoporosis     Wears glasses      Past Surgical History:   Procedure Laterality Date    APPENDECTOMY      Avastin os #2  8-    BACK SURGERY  2003    CATARACT EXTRACTION      os 12-13-12 od d 5-15-13//    EYE SURGERY  LEFT CATARACT 1/13    SPINE SURGERY      lumbar spine     Social History     Social History    Marital status:      Spouse name: N/A    Number of children: N/A    Years of education: N/A     Occupational History    Not on file.     Social History Main Topics    Smoking status: Never Smoker    Smokeless tobacco: Never Used      Comment: marijuana    Alcohol use No    Drug use: No    Sexual activity: Yes     Partners: Female     Other Topics Concern    Not on file     Social History Narrative    No narrative on file     Family History   Problem Relation Age of Onset  "   Heart disease Mother     Heart disease Father     Cancer Maternal Aunt      lymphoma    Pancreatic cancer Sister     Amblyopia Neg Hx     Blindness Neg Hx     Cataracts Neg Hx     Diabetes Neg Hx     Glaucoma Neg Hx     Hypertension Neg Hx     Macular degeneration Neg Hx     Retinal detachment Neg Hx     Strabismus Neg Hx     Stroke Neg Hx     Thyroid disease Neg Hx        Review of patient's allergies indicates:  No Known Allergies    Medication List with Changes/Refills   Current Medications    ASPIRIN (ECOTRIN) 81 MG EC TABLET    Take 81 mg by mouth once daily.     BD LUER-RADHA SYRINGE 3 ML 23 X 1 1/2" SYRG        BRIMONIDINE 0.15 % OPTH DROP (ALPHAGAN) 0.15 % OPHTHALMIC SOLUTION    INSTILL 1 DROP INTO EACH EYE TWICE A DAY    CLONIDINE (CATAPRES) 0.1 MG TABLET    Take 1 tablet (0.1 mg total) by mouth 3 (three) times daily.    DICLOFENAC-MISOPROSTOL  MG-MCG (ARTHROTEC 75)  MG-MCG PER TABLET    Take 1 tablet by mouth 2 (two) times daily.    DOXEPIN (SINEQUAN) 10 MG CAPSULE    Take 1 capsule (10 mg total) by mouth every evening.    EFFIENT 10 MG TAB    Take 1 tablet by mouth once daily.    FLUOXETINE (PROZAC) 40 MG CAPSULE    Take 1 capsule (40 mg total) by mouth once daily.    HYDROCHLOROTHIAZIDE (MICROZIDE) 12.5 MG CAPSULE    Take 1 capsule (12.5 mg total) by mouth once daily.    LABETALOL (NORMODYNE) 100 MG TABLET    Take 1 tablet (100 mg total) by mouth 2 (two) times daily.    LATANOPROST 0.005 % OPHTHALMIC SOLUTION    Place 1 drop into both eyes every evening.    LISINOPRIL (PRINIVIL,ZESTRIL) 20 MG TABLET    Take 1 tablet (20 mg total) by mouth once daily.    MELATONIN ORAL    Take 10 mg by mouth nightly.    NITROGLYCERIN (NITROSTAT) 0.4 MG SL TABLET    Place 1 tablet (0.4 mg total) under the tongue every 5 (five) minutes as needed for Chest pain. X3, if no relief, call 911    PANTOPRAZOLE (PROTONIX) 40 MG TABLET    Take 1 tablet (40 mg total) by mouth once daily.    POLYETHYLENE " "GLYCOL (GLYCOLAX) 17 GRAM PWPK    Take 1 g by mouth once daily at 6am.    POLYETHYLENE GLYCOL (GLYCOLAX) 17 GRAM/DOSE POWDER    TAKE 17 GRAMS MIXED IN LIQUID ONCE DAILY    PRAMIPEXOLE (MIRAPEX) 0.5 MG TABLET    Take 1 tablet (0.5 mg total) by mouth 3 (three) times daily.    TIZANIDINE (ZANAFLEX) 4 MG TABLET    Take 1 tablet (4 mg total) by mouth every 8 (eight) hours.    TRAZODONE (DESYREL) 50 MG TABLET        VERAPAMIL (CALAN-SR) 240 MG CR TABLET    TAKE 1 TABLET BY MOUTH ONCE q hs    ZOLPIDEM (AMBIEN) 10 MG TAB    Take 1 tablet (10 mg total) by mouth every evening. OUT OF MEDICATION       Review of Systems  Constitution: Denies chills, fever, and sweats.  HENT: Denies headaches or blurry vision.  Cardiovascular: Denies chest pain or irregular heart beat.  Respiratory: Denies cough or shortness of breath.  Gastrointestinal: Denies abdominal pain, nausea, or vomiting.  Musculoskeletal: Denies muscle cramps.  Neurological: Denies dizziness or focal weakness.  Psychiatric/Behavioral: Normal mental status.  Hematologic/Lymphatic: Denies bleeding problem or easy bruising/bleeding.  Skin: Denies rash or suspicious lesions    Physical Examination  /80 (BP Location: Left arm, Patient Position: Sitting)   Pulse 88   Ht 5' 8" (1.727 m)   Wt 93.5 kg (206 lb 2.1 oz)   SpO2 99%   BMI 31.34 kg/m²     Constitutional: No acute distress, conversant  HEENT: Sclera anicteric, Pupils equal, round and reactive to light, extraocular motions intact, Oropharynx clear  Neck: No JVD, no carotid bruits  Cardiovascular: regular rate and rhythm, no murmur, rubs or gallops, normal S1/S2  Pulmonary: Clear to auscultation bilaterally  Abdominal: Abdomen soft, nontender, nondistended, positive bowel sounds  Extremities: No lower extremity edema,   Pulses:  Carotid pulses are 2+ on the right side, and 2+ on the left side.  Radial pulses are 2+ on the right side, and 2+ on the left side.   Femoral pulses are 2+ on the right side, and 2+ " on the left side.  Skin: No ecchymosis, erythema, or ulcers  Psych: Alert and oriented x 3, appropriate affect  Neuro: CNII-XII intact, no focal deficits    Labs:  Most Recent Data  CBC:   Lab Results   Component Value Date    WBC 9.40 10/11/2017    HGB 13.8 (L) 10/11/2017    HCT 41.1 10/11/2017     10/11/2017    MCV 89 10/11/2017    RDW 13.1 10/11/2017     BMP:   Lab Results   Component Value Date     07/12/2017    K 4.1 07/12/2017     07/12/2017    CO2 27 07/12/2017    BUN 15 07/12/2017    CREATININE 1.0 07/12/2017     07/12/2017    CALCIUM 10.3 07/12/2017    MG 2.3 07/04/2017    PHOS 3.4 07/04/2017     LFTS;   Lab Results   Component Value Date    PROT 7.8 07/12/2017    ALBUMIN 4.0 07/12/2017    BILITOT 0.4 07/12/2017    AST 23 07/12/2017    ALKPHOS 106 07/12/2017    ALT 17 07/12/2017    GGT 33 12/15/2016     COAGS:   Lab Results   Component Value Date    INR 1.4 (H) 06/03/2017     FLP:   Lab Results   Component Value Date    CHOL 121 11/10/2016    HDL 37 (L) 11/10/2016    LDLCALC 60.2 (L) 11/10/2016    TRIG 119 11/10/2016    CHOLHDL 30.6 11/10/2016     CARDIAC: No results found for: TROPONINI, CKMB, BNP      EKG 12/13/2017:  Normal sinus rhythm  Left axis deviation  Possible anterior infarct (age undetermined)    Assessment/Plan:  Benny Murray is a 67 y.o. male with a past medical history of CAD s/p MI in 2001 and 2017 s/p PCI/ESTEFANÍA, CHF, HTN, who presents for an initial appointment.    1. CAD s/p MI in 2001 and 2017 s/p PCI/ESTEFANÍA- No symptoms currently.  Continue ASA, Effient, beta blocker, ACEI.  Start atorvastatin 20 mg daily.  It is reasonable to start testosterone supplementation.      2. CHF- EF unknown.  Check echo prior to next visit.      3. HTN- Continue current antihypertensive medication regimen.  Pt to keep log on blood pressure/heart rate and bring in next visit.     Obtain records from [facility]   Follow up in 3 months    Total duration of face to face  visit time 30 minutes.  Total time spent counseling greater than fifty percent of total visit time.  Counseling included discussion regarding imaging findings, diagnosis, possibilities, treatment options, risks and benefits.  The patient had many questions regarding the options and long-term effects.    Johnathon Gutierrez MD, PhD  Interventional Cardiology

## 2017-12-13 NOTE — PATIENT INSTRUCTIONS
Start atorvastatin 20 mg daily  Continue remainder of current medication regimen  Obtain records from [facility]   Follow up in 3 months with echo prior

## 2017-12-18 ENCOUNTER — PATIENT MESSAGE (OUTPATIENT)
Dept: FAMILY MEDICINE | Facility: CLINIC | Age: 67
End: 2017-12-18

## 2017-12-18 DIAGNOSIS — E29.1 HYPOGONADISM IN MALE: Primary | ICD-10-CM

## 2017-12-18 RX ORDER — TESTOSTERONE CYPIONATE 1000 MG/10ML
100 INJECTION, SOLUTION INTRAMUSCULAR
Qty: 10 ML | Refills: 0 | Status: SHIPPED | OUTPATIENT
Start: 2017-12-18 | End: 2018-04-09 | Stop reason: SDUPTHER

## 2018-01-09 ENCOUNTER — PATIENT MESSAGE (OUTPATIENT)
Dept: FAMILY MEDICINE | Facility: CLINIC | Age: 68
End: 2018-01-09

## 2018-01-09 DIAGNOSIS — F33.9 EPISODE OF RECURRENT MAJOR DEPRESSIVE DISORDER, UNSPECIFIED DEPRESSION EPISODE SEVERITY: ICD-10-CM

## 2018-01-09 RX ORDER — FLUOXETINE HYDROCHLORIDE 40 MG/1
40 CAPSULE ORAL DAILY
Qty: 30 CAPSULE | Refills: 11 | Status: SHIPPED | OUTPATIENT
Start: 2018-01-09

## 2018-02-07 ENCOUNTER — PES CALL (OUTPATIENT)
Dept: ADMINISTRATIVE | Facility: CLINIC | Age: 68
End: 2018-02-07

## 2018-02-12 DIAGNOSIS — K59.00 CONSTIPATION, UNSPECIFIED CONSTIPATION TYPE: ICD-10-CM

## 2018-02-12 RX ORDER — POLYETHYLENE GLYCOL 3350 17 G/17G
POWDER, FOR SOLUTION ORAL
Qty: 1581 G | Refills: 6 | Status: SHIPPED | OUTPATIENT
Start: 2018-02-12

## 2018-03-14 ENCOUNTER — OFFICE VISIT (OUTPATIENT)
Dept: FAMILY MEDICINE | Facility: CLINIC | Age: 68
End: 2018-03-14
Payer: MEDICARE

## 2018-03-14 ENCOUNTER — DOCUMENTATION ONLY (OUTPATIENT)
Dept: FAMILY MEDICINE | Facility: CLINIC | Age: 68
End: 2018-03-14

## 2018-03-14 ENCOUNTER — TELEPHONE (OUTPATIENT)
Dept: OPHTHALMOLOGY | Facility: CLINIC | Age: 68
End: 2018-03-14

## 2018-03-14 VITALS
WEIGHT: 218.5 LBS | HEART RATE: 79 BPM | SYSTOLIC BLOOD PRESSURE: 135 MMHG | HEIGHT: 68 IN | BODY MASS INDEX: 33.12 KG/M2 | TEMPERATURE: 99 F | OXYGEN SATURATION: 94 % | DIASTOLIC BLOOD PRESSURE: 86 MMHG

## 2018-03-14 DIAGNOSIS — E61.1 IRON DEFICIENCY: ICD-10-CM

## 2018-03-14 DIAGNOSIS — G47.69 OTHER SLEEP RELATED MOVEMENT DISORDERS: ICD-10-CM

## 2018-03-14 DIAGNOSIS — I10 ESSENTIAL HYPERTENSION: ICD-10-CM

## 2018-03-14 DIAGNOSIS — R40.0 HAS DAYTIME DROWSINESS: ICD-10-CM

## 2018-03-14 DIAGNOSIS — E55.9 VITAMIN D DEFICIENCY: ICD-10-CM

## 2018-03-14 DIAGNOSIS — E53.8 B12 DEFICIENCY: ICD-10-CM

## 2018-03-14 DIAGNOSIS — H40.003 GLAUCOMA SUSPECT, BILATERAL: ICD-10-CM

## 2018-03-14 DIAGNOSIS — E78.5 HYPERLIPIDEMIA, UNSPECIFIED HYPERLIPIDEMIA TYPE: ICD-10-CM

## 2018-03-14 DIAGNOSIS — J30.9 ACUTE ALLERGIC RHINITIS, UNSPECIFIED SEASONALITY, UNSPECIFIED TRIGGER: ICD-10-CM

## 2018-03-14 DIAGNOSIS — R79.89 LOW TESTOSTERONE IN MALE: ICD-10-CM

## 2018-03-14 DIAGNOSIS — H40.1130 PRIMARY OPEN ANGLE GLAUCOMA OF BOTH EYES, UNSPECIFIED GLAUCOMA STAGE: ICD-10-CM

## 2018-03-14 DIAGNOSIS — I25.10 CAD IN NATIVE ARTERY: Primary | ICD-10-CM

## 2018-03-14 DIAGNOSIS — R29.898 MUSCLE RIGIDITY: ICD-10-CM

## 2018-03-14 PROBLEM — F11.93 OPIATE WITHDRAWAL: Status: RESOLVED | Noted: 2017-05-23 | Resolved: 2018-03-14

## 2018-03-14 PROCEDURE — 3079F DIAST BP 80-89 MM HG: CPT | Mod: CPTII,S$GLB,, | Performed by: NURSE PRACTITIONER

## 2018-03-14 PROCEDURE — 99214 OFFICE O/P EST MOD 30 MIN: CPT | Mod: S$GLB,,, | Performed by: NURSE PRACTITIONER

## 2018-03-14 PROCEDURE — 3075F SYST BP GE 130 - 139MM HG: CPT | Mod: CPTII,S$GLB,, | Performed by: NURSE PRACTITIONER

## 2018-03-14 RX ORDER — BRIMONIDINE TARTRATE 2 MG/ML
1 SOLUTION/ DROPS OPHTHALMIC 2 TIMES DAILY
Qty: 30 ML | Refills: 3 | Status: SHIPPED | OUTPATIENT
Start: 2018-03-14

## 2018-03-14 RX ORDER — FLUTICASONE PROPIONATE 50 MCG
2 SPRAY, SUSPENSION (ML) NASAL DAILY
Qty: 16 G | Refills: 11 | Status: SHIPPED | OUTPATIENT
Start: 2018-03-14

## 2018-03-14 RX ORDER — CLOPIDOGREL BISULFATE 75 MG/1
75 TABLET ORAL DAILY
Qty: 30 TABLET | Refills: 11 | Status: SHIPPED | OUTPATIENT
Start: 2018-03-14 | End: 2018-05-15 | Stop reason: SDUPTHER

## 2018-03-14 NOTE — PROGRESS NOTES
Subjective:       Patient ID: Benny Murray is a 67 y.o. male.    Chief Complaint: Follow-up  No current chest pain, saw cardiologist, was ordered an echocardiogram, but could not get in for it, needs to reschedule it. Has some dyspnea, usually with activity, but better since he had MI. Ran out of effient and since it was ordered by the hospital physician, they did not refill it.     Was at the ophthalmologist and they seemed to think he may have sleep apnea. He suffers from insomnia, snores, has daytime drowsiness and htn. Has glaucoma of both eyes.     Depression is chronic and well controlled with prozac. Denies suicidal and homicidal ideation.     Has a chronic problem which is worsening with feeling like he needs to stretch, muscles get rigid, lasts about 5-10 seconds, but is now happening at least 30 times a day. Does not have any difficulty controlling the speed of his paces while walking. Denies any numbness, tingling and difficulty using hands or feet. Denies any continues weakness and functional decline of extremities. Has dizziness and nasal congestion associated with it.     HPI  Review of Systems   Respiratory: Positive for shortness of breath. Negative for cough.    Cardiovascular: Negative for chest pain and leg swelling.       Objective:      Physical Exam   Constitutional: He is oriented to person, place, and time. He appears well-developed and well-nourished. No distress.   HENT:   Head: Normocephalic and atraumatic.   Eyes: Conjunctivae are normal. Right eye exhibits no discharge. Left eye exhibits no discharge. No scleral icterus.   Cardiovascular: Normal rate, regular rhythm and normal heart sounds.  Exam reveals no gallop and no friction rub.    No murmur heard.  Pulmonary/Chest: Effort normal and breath sounds normal. No respiratory distress. He has no wheezes. He has no rales.   Neurological: He is alert and oriented to person, place, and time.   Skin: Skin is warm and dry. He is not  diaphoretic.   Psychiatric: He has a normal mood and affect. His behavior is normal.   Nursing note and vitals reviewed.      Assessment:       1. CAD in native artery    2. Acute allergic rhinitis, unspecified seasonality, unspecified trigger    3. Primary open angle glaucoma of both eyes, unspecified glaucoma stage    4. Muscle rigidity    5. Low testosterone in male    6. Has daytime drowsiness    7. Essential hypertension    8. Hyperlipidemia, unspecified hyperlipidemia type    9. Iron deficiency    10. B12 deficiency    11. Vitamin D deficiency    12. Other sleep related movement disorders         Plan:     CAD in native artery  -     clopidogrel (PLAVIX) 75 mg tablet; Take 1 tablet (75 mg total) by mouth once daily.  Dispense: 30 tablet; Refill: 11    Acute allergic rhinitis, unspecified seasonality, unspecified trigger  -     fluticasone (FLONASE) 50 mcg/actuation nasal spray; 2 sprays (100 mcg total) by Each Nare route once daily.  Dispense: 16 g; Refill: 11    Primary open angle glaucoma of both eyes, unspecified glaucoma stage    Muscle rigidity  -     Ambulatory Referral to Neurology    Low testosterone in male  -     TESTOSTERONE PANEL; Future; Expected date: 03/14/2018    Has daytime drowsiness  -     Polysomnography 4 or more parameters; Future    Essential hypertension  -     CBC auto differential; Future; Expected date: 03/14/2018  -     Comprehensive metabolic panel; Future; Expected date: 03/14/2018  -     TSH; Future; Expected date: 03/14/2018    Hyperlipidemia, unspecified hyperlipidemia type  -     Lipid panel; Future; Expected date: 03/14/2018    Iron deficiency  -     Ferritin; Future; Expected date: 03/14/2018  -     Iron and TIBC; Future; Expected date: 03/14/2018    B12 deficiency  -     Vitamin B12; Future; Expected date: 03/14/2018    Vitamin D deficiency  -     Vitamin D; Future; Expected date: 03/14/2018    Other sleep related movement disorders   -     Polysomnography 4 or more  parameters; Future        Take atorvastatin as early as supper or as late as bedtime.   3 months, please reschedule for echocardiogram  Labs

## 2018-03-15 ENCOUNTER — TELEPHONE (OUTPATIENT)
Dept: OPHTHALMOLOGY | Facility: CLINIC | Age: 68
End: 2018-03-15

## 2018-03-15 NOTE — TELEPHONE ENCOUNTER
Called Rochelle at [facility] to make sure she got the updated rx for brimonidine 0.2%. Rochelle confirmed she did receive new rx and will contact pt.

## 2018-04-09 DIAGNOSIS — E29.1 HYPOGONADISM IN MALE: ICD-10-CM

## 2018-04-09 RX ORDER — TESTOSTERONE CYPIONATE 1000 MG/10ML
INJECTION, SOLUTION INTRAMUSCULAR
Qty: 10 ML | Refills: 0 | Status: SHIPPED | OUTPATIENT
Start: 2018-04-09

## 2018-04-10 ENCOUNTER — PATIENT MESSAGE (OUTPATIENT)
Dept: FAMILY MEDICINE | Facility: CLINIC | Age: 68
End: 2018-04-10

## 2018-04-10 DIAGNOSIS — I25.2 HISTORY OF MI (MYOCARDIAL INFARCTION): ICD-10-CM

## 2018-04-10 DIAGNOSIS — I10 ESSENTIAL HYPERTENSION, BENIGN: ICD-10-CM

## 2018-04-10 DIAGNOSIS — R00.0 TACHYCARDIA: ICD-10-CM

## 2018-04-10 RX ORDER — LABETALOL 100 MG/1
TABLET, FILM COATED ORAL
Qty: 60 TABLET | Refills: 3 | Status: SHIPPED | OUTPATIENT
Start: 2018-04-10 | End: 2018-05-15 | Stop reason: SDUPTHER

## 2018-04-11 ENCOUNTER — APPOINTMENT (OUTPATIENT)
Dept: LAB | Facility: HOSPITAL | Age: 68
End: 2018-04-11
Attending: PATHOLOGY
Payer: MEDICARE

## 2018-04-11 ENCOUNTER — CLINICAL SUPPORT (OUTPATIENT)
Dept: FAMILY MEDICINE | Facility: CLINIC | Age: 68
End: 2018-04-11
Payer: MEDICARE

## 2018-04-11 DIAGNOSIS — R79.89 LOW TESTOSTERONE IN MALE: ICD-10-CM

## 2018-04-11 DIAGNOSIS — E55.9 VITAMIN D DEFICIENCY: ICD-10-CM

## 2018-04-11 DIAGNOSIS — I10 ESSENTIAL HYPERTENSION: ICD-10-CM

## 2018-04-11 DIAGNOSIS — E78.5 HYPERLIPIDEMIA, UNSPECIFIED HYPERLIPIDEMIA TYPE: ICD-10-CM

## 2018-04-11 DIAGNOSIS — E61.1 IRON DEFICIENCY: ICD-10-CM

## 2018-04-11 DIAGNOSIS — E53.8 B12 DEFICIENCY: ICD-10-CM

## 2018-04-11 LAB
25(OH)D3+25(OH)D2 SERPL-MCNC: 40 NG/ML
ALBUMIN SERPL BCP-MCNC: 3.5 G/DL
ALP SERPL-CCNC: 85 U/L
ALT SERPL W/O P-5'-P-CCNC: 22 U/L
ANION GAP SERPL CALC-SCNC: 12 MMOL/L
AST SERPL-CCNC: 23 U/L
BASOPHILS # BLD AUTO: 0 K/UL
BASOPHILS NFR BLD: 0.4 %
BILIRUB SERPL-MCNC: 0.5 MG/DL
BUN SERPL-MCNC: 24 MG/DL
CALCIUM SERPL-MCNC: 9.6 MG/DL
CHLORIDE SERPL-SCNC: 105 MMOL/L
CHOLEST SERPL-MCNC: 91 MG/DL
CHOLEST/HDLC SERPL: 2.5 {RATIO}
CO2 SERPL-SCNC: 26 MMOL/L
CREAT SERPL-MCNC: 1.6 MG/DL
DIFFERENTIAL METHOD: ABNORMAL
EOSINOPHIL # BLD AUTO: 0.2 K/UL
EOSINOPHIL NFR BLD: 1.5 %
ERYTHROCYTE [DISTWIDTH] IN BLOOD BY AUTOMATED COUNT: 13 %
EST. GFR  (AFRICAN AMERICAN): 51 ML/MIN/1.73 M^2
EST. GFR  (NON AFRICAN AMERICAN): 44 ML/MIN/1.73 M^2
FERRITIN SERPL-MCNC: 47 NG/ML
GLUCOSE SERPL-MCNC: 98 MG/DL
HCT VFR BLD AUTO: 38.6 %
HDLC SERPL-MCNC: 37 MG/DL
HDLC SERPL: 40.7 %
HGB BLD-MCNC: 12.6 G/DL
IRON SERPL-MCNC: 17 UG/DL
LDLC SERPL CALC-MCNC: 39 MG/DL
LYMPHOCYTES # BLD AUTO: 3 K/UL
LYMPHOCYTES NFR BLD: 27.1 %
MCH RBC QN AUTO: 28.6 PG
MCHC RBC AUTO-ENTMCNC: 32.5 G/DL
MCV RBC AUTO: 88 FL
MONOCYTES # BLD AUTO: 1 K/UL
MONOCYTES NFR BLD: 9.1 %
NEUTROPHILS # BLD AUTO: 6.8 K/UL
NEUTROPHILS NFR BLD: 61.9 %
NONHDLC SERPL-MCNC: 54 MG/DL
PLATELET # BLD AUTO: 217 K/UL
PMV BLD AUTO: 8.3 FL
POTASSIUM SERPL-SCNC: 3.9 MMOL/L
PROT SERPL-MCNC: 7.6 G/DL
RBC # BLD AUTO: 4.39 M/UL
SATURATED IRON: 5 %
SODIUM SERPL-SCNC: 143 MMOL/L
TOTAL IRON BINDING CAPACITY: 364 UG/DL
TRANSFERRIN SERPL-MCNC: 246 MG/DL
TRIGL SERPL-MCNC: 75 MG/DL
TSH SERPL DL<=0.005 MIU/L-ACNC: 1.55 UIU/ML
VIT B12 SERPL-MCNC: 981 PG/ML
WBC # BLD AUTO: 11 K/UL

## 2018-04-11 PROCEDURE — 82040 ASSAY OF SERUM ALBUMIN: CPT

## 2018-04-11 PROCEDURE — 82306 VITAMIN D 25 HYDROXY: CPT

## 2018-04-11 PROCEDURE — 83540 ASSAY OF IRON: CPT

## 2018-04-11 PROCEDURE — 80061 LIPID PANEL: CPT

## 2018-04-11 PROCEDURE — 84443 ASSAY THYROID STIM HORMONE: CPT

## 2018-04-11 PROCEDURE — 82728 ASSAY OF FERRITIN: CPT

## 2018-04-11 PROCEDURE — 80053 COMPREHEN METABOLIC PANEL: CPT

## 2018-04-11 PROCEDURE — 85025 COMPLETE CBC W/AUTO DIFF WBC: CPT

## 2018-04-11 PROCEDURE — 82607 VITAMIN B-12: CPT

## 2018-04-16 LAB
ALBUMIN SERPL-MCNC: 4 G/DL (ref 3.6–5.1)
SHBG SERPL-SCNC: 37 NMOL/L (ref 22–77)
TESTOST FREE SERPL-MCNC: 32.9 PG/ML (ref 46–224)
TESTOST SERPL-MCNC: 279 NG/DL (ref 250–1100)
TESTOSTERONE.FREE+WB SERPL-MCNC: 60.5 NG/DL (ref 110–575)

## 2018-05-15 DIAGNOSIS — M54.5 CHRONIC LOW BACK PAIN, UNSPECIFIED BACK PAIN LATERALITY, WITH SCIATICA PRESENCE UNSPECIFIED: ICD-10-CM

## 2018-05-15 DIAGNOSIS — I10 ESSENTIAL HYPERTENSION: ICD-10-CM

## 2018-05-15 DIAGNOSIS — R00.0 TACHYCARDIA: ICD-10-CM

## 2018-05-15 DIAGNOSIS — I25.2 HISTORY OF MI (MYOCARDIAL INFARCTION): ICD-10-CM

## 2018-05-15 DIAGNOSIS — G25.81 RESTLESS LEG SYNDROME: ICD-10-CM

## 2018-05-15 DIAGNOSIS — G89.29 CHRONIC LOW BACK PAIN, UNSPECIFIED BACK PAIN LATERALITY, WITH SCIATICA PRESENCE UNSPECIFIED: ICD-10-CM

## 2018-05-15 DIAGNOSIS — I10 ESSENTIAL HYPERTENSION, BENIGN: ICD-10-CM

## 2018-05-15 DIAGNOSIS — I25.10 CAD IN NATIVE ARTERY: ICD-10-CM

## 2018-05-15 RX ORDER — CLONIDINE HYDROCHLORIDE 0.1 MG/1
0.1 TABLET ORAL 3 TIMES DAILY
Qty: 270 TABLET | Refills: 1 | Status: SHIPPED | OUTPATIENT
Start: 2018-05-15 | End: 2019-05-15

## 2018-05-15 RX ORDER — CLOPIDOGREL BISULFATE 75 MG/1
75 TABLET ORAL DAILY
Qty: 90 TABLET | Refills: 1 | Status: SHIPPED | OUTPATIENT
Start: 2018-05-15 | End: 2019-05-15

## 2018-05-15 RX ORDER — PRAMIPEXOLE DIHYDROCHLORIDE 0.5 MG/1
0.5 TABLET ORAL 3 TIMES DAILY
Qty: 270 TABLET | Refills: 1 | Status: SHIPPED | OUTPATIENT
Start: 2018-05-15 | End: 2019-05-15

## 2018-05-15 RX ORDER — DICLOFENAC SODIUM AND MISOPROSTOL 75; 200 MG/1; UG/1
1 TABLET, DELAYED RELEASE ORAL 2 TIMES DAILY
Qty: 60 TABLET | Refills: 11 | OUTPATIENT
Start: 2018-05-15 | End: 2019-05-15

## 2018-05-15 RX ORDER — LABETALOL 100 MG/1
100 TABLET, FILM COATED ORAL 2 TIMES DAILY
Qty: 180 TABLET | Refills: 1 | Status: SHIPPED | OUTPATIENT
Start: 2018-05-15

## 2018-05-15 RX ORDER — HYDROCHLOROTHIAZIDE 12.5 MG/1
12.5 CAPSULE ORAL DAILY
Qty: 90 CAPSULE | Refills: 1 | Status: SHIPPED | OUTPATIENT
Start: 2018-05-15

## 2018-06-04 ENCOUNTER — TELEPHONE (OUTPATIENT)
Dept: FAMILY MEDICINE | Facility: CLINIC | Age: 68
End: 2018-06-04

## 2018-06-04 DIAGNOSIS — R19.5 POSITIVE FIT (FECAL IMMUNOCHEMICAL TEST): Primary | ICD-10-CM

## 2018-06-15 ENCOUNTER — PATIENT MESSAGE (OUTPATIENT)
Dept: FAMILY MEDICINE | Facility: CLINIC | Age: 68
End: 2018-06-15

## 2018-06-15 DIAGNOSIS — I10 ESSENTIAL HYPERTENSION: ICD-10-CM

## 2018-06-15 DIAGNOSIS — K21.9 GASTROESOPHAGEAL REFLUX DISEASE, ESOPHAGITIS PRESENCE NOT SPECIFIED: Primary | ICD-10-CM

## 2018-06-15 RX ORDER — LISINOPRIL 20 MG/1
20 TABLET ORAL DAILY
Qty: 90 TABLET | Refills: 0 | Status: SHIPPED | OUTPATIENT
Start: 2018-06-15 | End: 2018-07-15

## 2018-06-15 RX ORDER — PANTOPRAZOLE SODIUM 40 MG/1
40 TABLET, DELAYED RELEASE ORAL DAILY
Qty: 30 TABLET | Refills: 5 | Status: SHIPPED | OUTPATIENT
Start: 2018-06-15

## 2018-07-24 ENCOUNTER — PES CALL (OUTPATIENT)
Dept: ADMINISTRATIVE | Facility: CLINIC | Age: 68
End: 2018-07-24

## 2018-11-09 ENCOUNTER — PATIENT MESSAGE (OUTPATIENT)
Dept: OPHTHALMOLOGY | Facility: CLINIC | Age: 68
End: 2018-11-09

## 2018-12-01 NOTE — TELEPHONE ENCOUNTER
Problem: Patient Care Overview  Goal: Interdisciplinary Rounds/Family Conf  Outcome: Ongoing (interventions implemented as appropriate)   12/01/18 0608   Interdisciplinary Rounds/Family Conf   Participants nursing;patient;pharmacy;physician;respiratory therapy       Comments: Pt AAO x 4.  VSS.  Pt remained afebrile throughout this shift.   Pt remained free of falls this shift.   Pt denied pain during shift.  Plan of care reviewed. Patient verbalizes understanding.   Pt moving/turing per self. Frequent weight shifting encouraged.  Patient normal sinus NSR on monitor.   Bed low, side rails up x 2, wheels locked, call light in reach.   Patient instructed to call for assistance.   Hourly rounding completed.   24 hour chart check completed.  Will continue to monitor.     Rochelle with [facility] called stating that the co-pay on pt's brimonidine .15% has gone up to $46. Rochelle says that the .2% is only $5 and wants to know if he can be switched to that. I told her I would send message to Dr Crow and get back with her.

## 2024-12-31 NOTE — TELEPHONE ENCOUNTER
Harvoni x 8 weeks escripted to [facility] 12/21/16.  Start date for Harvoni 1/5/17.  Please send following lab schedule to patient:    Labs:  Wk 4 on 2/2/17: CMP, HCV RNA delmar quant ordered  Wk 6 on 2/16/17: CMP, HCV RNA delmar quant ordered  Wk 8 on 3/2/17: CMP, HCV RNA delmar quant ordered  Post-rx wk 4 on 3/30/17:  HCV RNA delmar quant ordered  Post-rx wk 12 on 5/25/17: HCV RNA delmar quant ordered  Post-rx wk 24 on 8/17/17:  HCV RNA delmar quant ordered         Poli    Thank you for choosing [facility].  We know you have options when it comes to your healthcare; we appreciate that you chose us. Our goal is to provide exceptional  service and world class care to every patient.  You will be receiving a survey via email or text message asking for your feedback.  Please take a few minutes to share your thoughts about your recent visit. Your comments help us understand what we do well and ways we can improve.  Thank you in advance for your valuable feedback.      Dr. Chelita STARR MA